# Patient Record
Sex: FEMALE | Race: BLACK OR AFRICAN AMERICAN | NOT HISPANIC OR LATINO | Employment: FULL TIME | URBAN - METROPOLITAN AREA
[De-identification: names, ages, dates, MRNs, and addresses within clinical notes are randomized per-mention and may not be internally consistent; named-entity substitution may affect disease eponyms.]

---

## 2018-01-31 ENCOUNTER — OFFICE VISIT (OUTPATIENT)
Dept: INTERNAL MEDICINE | Facility: CLINIC | Age: 33
End: 2018-01-31
Payer: COMMERCIAL

## 2018-01-31 VITALS
SYSTOLIC BLOOD PRESSURE: 144 MMHG | DIASTOLIC BLOOD PRESSURE: 82 MMHG | TEMPERATURE: 97 F | HEART RATE: 76 BPM | WEIGHT: 221.56 LBS | HEIGHT: 64 IN | BODY MASS INDEX: 37.83 KG/M2

## 2018-01-31 DIAGNOSIS — T63.481A INSECT STINGS, ACCIDENTAL OR UNINTENTIONAL, INITIAL ENCOUNTER: Primary | ICD-10-CM

## 2018-01-31 DIAGNOSIS — R11.0 NAUSEA: ICD-10-CM

## 2018-01-31 DIAGNOSIS — R07.89 SENSATION OF CHEST TIGHTNESS: ICD-10-CM

## 2018-01-31 PROCEDURE — 3008F BODY MASS INDEX DOCD: CPT | Mod: S$GLB,,, | Performed by: NURSE PRACTITIONER

## 2018-01-31 PROCEDURE — 96372 THER/PROPH/DIAG INJ SC/IM: CPT | Mod: S$GLB,,, | Performed by: FAMILY MEDICINE

## 2018-01-31 PROCEDURE — 99999 PR PBB SHADOW E&M-EST. PATIENT-LVL III: CPT | Mod: PBBFAC,,, | Performed by: NURSE PRACTITIONER

## 2018-01-31 PROCEDURE — 99214 OFFICE O/P EST MOD 30 MIN: CPT | Mod: 25,S$GLB,, | Performed by: NURSE PRACTITIONER

## 2018-01-31 RX ORDER — MUPIROCIN 20 MG/G
OINTMENT TOPICAL 2 TIMES DAILY
Qty: 15 G | Refills: 0 | Status: SHIPPED | OUTPATIENT
Start: 2018-01-31 | End: 2018-02-14

## 2018-01-31 RX ORDER — BETAMETHASONE SODIUM PHOSPHATE AND BETAMETHASONE ACETATE 3; 3 MG/ML; MG/ML
12 INJECTION, SUSPENSION INTRA-ARTICULAR; INTRALESIONAL; INTRAMUSCULAR; SOFT TISSUE
Status: COMPLETED | OUTPATIENT
Start: 2018-01-31 | End: 2018-01-31

## 2018-01-31 RX ADMIN — BETAMETHASONE SODIUM PHOSPHATE AND BETAMETHASONE ACETATE 12 MG: 3; 3 INJECTION, SUSPENSION INTRA-ARTICULAR; INTRALESIONAL; INTRAMUSCULAR; SOFT TISSUE at 11:01

## 2018-01-31 NOTE — PROGRESS NOTES
Subjective:       Patient ID: Raiza Mcpherson is a 32 y.o. female.    Chief Complaint: Insect Bite (rt leg)    Pt presents with complaints of possible insect bite to right leg x 1 day    She reports that she is not sure what bit her or when, but she began having mild pain and itching to her right lower leg on yesterday. She later began to experience chest tightness and nausea. She did not take anything OTC. She denies cp, sob, palpitations, fever, dysphagia, blurred vision, headache, dizziness, syncope, abd pain, vomiting or any other acute symptoms.      Review of Systems   Constitutional: Negative for activity change, appetite change, chills, diaphoresis, fatigue, fever and unexpected weight change.   HENT: Negative for congestion, ear pain, postnasal drip, rhinorrhea, sinus pain, sinus pressure, sneezing, sore throat, tinnitus, trouble swallowing and voice change.    Eyes: Negative for photophobia, pain and visual disturbance.   Respiratory: Positive for chest tightness (improved, but still mildly present). Negative for cough, shortness of breath and wheezing.    Cardiovascular: Negative for chest pain, palpitations and leg swelling.   Gastrointestinal: Positive for nausea (resolved ). Negative for abdominal distention, abdominal pain, blood in stool, constipation, diarrhea and vomiting.   Genitourinary: Negative for dysuria and frequency.   Musculoskeletal: Negative for arthralgias, back pain, joint swelling, neck pain and neck stiffness.   Skin: Positive for color change (erythema and itching to right lower leg ).   Neurological: Negative for dizziness, tremors, seizures, syncope, facial asymmetry, speech difficulty, weakness, light-headedness, numbness and headaches.   Psychiatric/Behavioral: Negative for confusion and sleep disturbance. The patient is nervous/anxious.        Objective:      Physical Exam   Constitutional: She is oriented to person, place, and time.   Neck: Normal range of motion. Neck  supple.   Cardiovascular: Normal rate, regular rhythm and normal heart sounds.    Pulmonary/Chest: Effort normal and breath sounds normal.   Musculoskeletal: Normal range of motion.   Neurological: She is alert and oriented to person, place, and time.   Skin: Skin is warm and dry.   There is a small erythematous papule with very mild swelling noted. No signs of secondary infection.    Psychiatric: She has a normal mood and affect.       Assessment:       1. Insect stings, accidental or unintentional, initial encounter    2. Sensation of chest tightness    3. Nausea        Plan:   Insect stings, accidental or unintentional, initial encounter    Sensation of chest tightness    Nausea    Other orders  -     betamethasone acetate-betamethasone sodium phosphate injection 12 mg; Inject 2 mLs (12 mg total) into the muscle one time.  -     mupirocin (BACTROBAN) 2 % ointment; Apply topically 2 (two) times daily.  Dispense: 15 g; Refill: 0      As above  Zyrtec daily PRN, benadryl daily PRN   Pepcid daily OTC x 3 days and PRN  Side effects of steroid injection discussed in detail- she verbalizes understanding    ollow-up care  Follow up with your healthcare provider, or as advised if your symptoms do not continue to improve.  Call 911  Call 911 if any of these occur:  · Trouble breathing or swallowing, wheezing   · Cool, moist, pale skin  · Hoarse voice or trouble speaking  · Confused  · Very drowsy or trouble waking up  · Fainting or loss of consciousness  · Rapid heart rate  · Low blood pressure or feeling dizzy or weak  · Feeling of doom  · Severe nausea, vomiting, or diarrhea  · Seizure  · Swelling in the face, eyelids, lips, mouth, throat or tongue  · Drooling  When to seek medical advice  Call your healthcare provider right away if any of the following occur:  · Spreading areas of itching, redness or swelling  · Headache, fever, chills, muscle or joint aching  · Increased pain or swelling  · Signs of infection of the  affected area:  ¨ Spreading redness  ¨ Increase in pain or swelling  ¨ Fluid or colored drainage from the affected site

## 2018-01-31 NOTE — PATIENT INSTRUCTIONS
Take Zyrtec during the day, benadryl at night for itching  Pepcid over the counter for nausea/stomach irritation      Insect Sting: Local Reaction   You have been stung or bitten by an insect. The insects venom or body fluid is causing your skin to react in the area where you were stung or bitten. This often causes redness, itching and swelling. This reaction will fade over a few hours, but it can last a few days. An insect bite or sting can become infected 1 to 3 days later, so watch for the signs below. Sometimes it is hard to tell the difference between a local reaction to the insect bite or sting and an early infection, so you may be given antibiotics.  Common insect stings causing problems are from wasps, bees, yellow jackets, and hornets. Common bites are from spiders, mosquitoes, fleas, or ticks. Other types of insects may be more common in different parts of the country or world.  Most people think of allergic reactions when someone has a rash or itchy skin. Symptoms can include:  · Rash, hives, redness, welts, or blisters  · Itching, burning, stinging, or pain  · Swelling around the sting area.  Sometimes swelling spreads to other areas.  Home care  Medicines  The healthcare provider may prescribe medicines to relieve swelling, itching, and pain. Follow the providers instructions when taking these medicines.  · If you had a severe reaction, the provider may prescribe an epinephrine kit. Epinephrine will stop an allergic reaction from getting worse. Before you leave the hospital, be sure that you understand when and how to use this medicine.  · Diphenhydramine is an oral antihistamine available at drugstores and groceries. Unless a prescription antihistamine was given, you can use this medicine to reduce itching if large areas of the skin are involved. The medicine may make you sleepy, so be careful using it in the daytime or when going to school, working, or driving. Dont use diphenhydramine if you  have glaucoma or if you are a man with trouble urinating because of an enlarged prostate. Other antihistamines cause less drowsiness and are good choices for daytime use. Ask your pharmacist for suggestions.  · Dont use diphenhydramine cream on your skin. In some people it can cause additional reaction and make you allergic to this medicine.  · Calamine lotion or oatmeal baths sometimes help with itching.  · You may use acetaminophen or ibuprofen to control pain, unless another pain medicine was prescribed. Talk with your healthcare provider before using these medicines if you have chronic liver or kidney disease. Also talk with your provider if youve had a stomach ulcer or GI bleeding.  General care    · If itching is a problem, dont take hot showers or baths. Stay out of direct sunlight. These heat up your skin and will make the itching worse.  · Use an ice pack to reduce local areas of redness and itching. You can make your own ice pack by putting ice cubes in a bag that seals and wrapping it in a thin towel. Dont put the ice directly on your skin, because it can damage the skin.  · Try not to scratch any affected areas and damage the skin. This will help prevent an infection.  · If oral antibiotics were prescribed, be sure to take them until finished.  Preventing future reactions  · Future reactions could be worse than this one, so try to stay away from places where you might be stung again.  · Be aware that honeybees nest in trees. Wasps and yellow jackets nest in the ground, trees, or roof eaves.  · If you are stung by a honeybee, a stinger will remain in your skin. Wasps, yellow jackets, and hornets dont leave a stinger behind. Move away from the nest area right away. The stinger of a honeybee releases a substance that will attract other bees to you. Once you are away from the nest, then remove the stinger as quickly as possible.  · After any sting, you may apply ice and take diphenhydramine or another  antihistamine. If you develop any of the warning signs below, seek help right away.  · If you are at high risk for another sting, or if your reaction included dizziness, fainting, or trouble breathing or swallowing, ask your doctor for an insect allergy kit.  · Remove any ticks on the skin with a set of fine tweezers.  the tick as close to the skin as possible. Pull back gently but firmly. Use an even, steady pressure. Dont jerk or twist. Dont squeeze, crush, or puncture the body of the tick. The bodily fluids may contain infection-causing germs. Dont use a smoldering match or cigarette, nail polish, petroleum jelly, liquid soap, or kerosene. These may irritate the tick. If any mouthparts of the tick remain in the skin, these should be left alone. They will fall off on their own. Trying to remove these parts may damage the skin unless they can be removed very easily. After the tick is removed, wash the bite area with rubbing alcohol, iodine, or soap and water.  Follow-up care  Follow up with your doctor in 2 days, or as advised, if your symptoms dont start to get better.  Call 911  Call 911 if any of these occur:  · Trouble breathing or swallowing, or wheezing  · New or worsening swelling in the mouth, throat, or tongue  · Hoarse voice or trouble speaking  · Confused  · Very drowsy or trouble awakening  · Fainting or loss of consciousness  · Rapid heart rate  · Low blood pressure  · Feeling of doom  · Nausea, vomiting, abdominal pain, or diarrhea  · Vomiting blood, or large amounts of blood in stool  · Seizure  When to seek medical advice  Call your healthcare provider right away if any of these occur:  · Spreading areas of itching, redness or swelling  · New or worse swelling in the face, eyelids, or  lips  · Dizziness or weakness  Also call your provider right away if you have signs of infection:  · Spreading redness  · Increased pain or swelling  · Fever of 100.4°F (38°C) or higher, or as directed by your  healthcare provider  · Colored fluid draining from the sting area   Date Last Reviewed: 10/1/2016  © 9316-3988 [employer]. [address] [place] [address], [place] [address]. All rights reserved. This information is not intended as a substitute for professional medical care. Always follow your healthcare professional's instructions.        Insect Sting Allergy, Generalized  You are having an allergic reaction to an insect sting. This may occur after a sting by a wasp, honeybee, yellow jacket, or other insect. This may cause an itchy rash and swelling in the face or other parts of the body. A more severe reaction may cause you to feel dizzy, faint, or have trouble breathing or swallowing. Other warning signs are listed below.  Symptoms can include:  · Rash, hives, redness, welts, or blisters in areas other than the sting site  · Itching, burning, stinging, pain in areas other than the sting site  · Dry, flaky, cracking, scaly skin  · Swelling in areas other than the sting site   · Stomach pain or cramps  More severe symptoms include:  · Swelling of the face or lips or drooling  · Trouble swallowing, feeling like your throat is closing  · Trouble breathing, wheezing  · Dizziness or a sudden decrease in blood pressure  · Hoarse voice or trouble speaking  · Severe nausea, vomiting, or diarrhea  · Feeling faint or lightheaded  · Rapid heart rate  Home care  Medicine  The healthcare provider may prescribe medicines to relieve swelling, itching, and pain. Follow the providers instructions when taking these medicines.  · If you had a severe reaction, the provider may prescribe an injectable epinephrine kit. Epinephrine will stop the progression of an allergic reaction. Before you leave the hospital, be sure that you understand when and how to use this medicine.  · Oral diphenhydramine is an over-the-counter antihistamine available at pharmacies and grocery stores. Unless a prescription antihistamine was given,  diphenhydramine may be used to reduce itching if large areas of the skin are involved. It may make you sleepy, so be careful using it in the daytime or when going to school, working, or driving. Note: Dont use diphenhydramine if you have glaucoma or if you are a man with trouble urinating due to an enlarged prostate. There are other antihistamines that cause less drowsiness and are good choices for daytime use. Ask your pharmacist for suggestions.  · Dont use diphenhydramine cream on your skin. It can cause a further reaction in some people.  · Calamine lotion or oatmeal baths sometimes help with itching.  · You may use acetaminophen or ibuprofen to control pain, unless another pain medicine was prescribed. Note: If you have chronic liver or kidney disease or ever had a stomach ulcer or gastrointestinal bleeding, talk with your provider before using these medicines.    General care  Avoid tight clothing and things that heat up your skin (such as hot showers or baths, or direct sunlight). Heat makes the itching worse.  An ice pack will relieve local areas of intense itching and redness. Apply 5 to 10 minutes. To make an ice pack, put ice cubes in a plastic bag that seals at the top. Wrap the bag in a clean, thin towel or cloth. Dont put ice directly on the skin.  Ticks  If you try to remove a tick, do the following:  · Use a set of fine tweezers and  the tick as close to the skin as is possible.  · Pull upwards, using even, steady pressure. Dont jerk or twist the tick. The ticks bodily fluids may contain infection-causing organisms. So dont squeeze, crush, or puncture the body of the tick. Dont use a smoldering match or cigarette, nail polish, petroleum jelly, liquid soap, or kerosene. They may irritate the tick.  · If any mouthparts of the tick remain in the skin, these can be removed with tweezers. If you cant remove the mouth (of a tick) easily with clean tweezers, leave it alone and let the skin  heal.  · After the tick is removed, wash the bite area with rubbing alcohol, iodine, or soap and water.  · Put the tick in a sealed container and completely cover it with alcohol. Never try to kill or crush a tick with your hand or fingers.  Stings  Wasps, yellow jackets, and hornets dont leave a stinger behind. But if a honeybee stings you, a stinger may stay in your skin. The stinger of a honeybee releases a substance that will attract other bees to you. So try to move away from the nest immediately. Once you are away from the nest, then remove the stinger as quickly as possible by:  · Scraping the stinger out with the edge of a dull knife or plastic card (credit card).  · Don't use a tweezer or your fingers to remove the stinger since that may squeeze more toxin from the stinger.  · Wash the affected area with soap and warm water 2 to 3 times a day. Don't break a blister, if present.  · Next apply an ice pack for 5 to 10 minutes. To make an ice pack, put ice cubes in a plastic bag that seals at the top. Wrap the bag in a clean, thin towel or cloth. Dont put ice directly on the skin.  · Contact your healthcare provider and ask what can be used to help decrease the swelling and itching to the affected area.   · To prevent an infection, don't scratch the affected areas. Always check the sting area for signs of an infection: increased redness, swelling, or pain to the affected area.  Preventing future reactions  Future reactions could be worse than this one. So try to avoid situations where you might be stung:  · Don't walk in grass without shoes. Avoid wearing sandals.  · Don't leave food uncovered when eating outside. Sweet treats, watermelon, and ice cream attract insects.  · Don't drink from uncovered sweetened drinks in cans when outside. Insects are attracted to soda drink cans and sometimes crawl inside of them.  · Don't wear bright colored clothes with flowery prints and patterns when outside.  · Dont wear  perfume when outside. Smell attracts insects.  · Wear long pants, long-sleeved shirts, socks, and work gloves when working outside.  · Be aware that honeybees nest in trees. Wasps and yellow jackets nest in the ground, trees or roof eaves. Avoid garbage cans when outside.  Auto-injectable epinephrine  · If you are at high risk for another sting due to where you work or play, or if your reaction included dizziness, fainting or trouble breathing or swallowing, an auto-injectable epinephrine may be prescribed. If not, ask your healthcare provider for one and always carry it with you. Learn how to use the device. If you begin to feel the symptoms of another reaction in the future, use the auto-injectable epinephrine to inject yourself, and then call 911. Don't wait until symptoms become severe.   · Remember that the auto-injectable epinephrine is a rescue medicine only. You still need someone to take you to the hospital or call 911 after you have received the medicine.  Follow-up care  Follow up with your healthcare provider, or as advised if your symptoms do not continue to improve.  Call 911  Call 911 if any of these occur:  · Trouble breathing or swallowing, wheezing   · Cool, moist, pale skin  · Hoarse voice or trouble speaking  · Confused  · Very drowsy or trouble waking up  · Fainting or loss of consciousness  · Rapid heart rate  · Low blood pressure or feeling dizzy or weak  · Feeling of doom  · Severe nausea, vomiting, or diarrhea  · Seizure  · Swelling in the face, eyelids, lips, mouth, throat or tongue  · Drooling  When to seek medical advice  Call your healthcare provider right away if any of the following occur:  · Spreading areas of itching, redness or swelling  · Headache, fever, chills, muscle or joint aching  · Increased pain or swelling  · Signs of infection of the affected area:  ¨ Spreading redness  ¨ Increase in pain or swelling  ¨ Fluid or colored drainage from the affected site  Date Last Reviewed:  3/1/2017  © 2929-5822 [employer]. [address] [place] [address], [place] [address]. All rights reserved. This information is not intended as a substitute for professional medical care. Always follow your healthcare professional's instructions.

## 2018-11-14 LAB
HUMAN PAPILLOMAVIRUS (HPV): NORMAL
PAP RECOMMENDATION EXT: ABNORMAL

## 2019-08-16 ENCOUNTER — OFFICE VISIT (OUTPATIENT)
Dept: INTERNAL MEDICINE | Facility: CLINIC | Age: 34
End: 2019-08-16
Payer: COMMERCIAL

## 2019-08-16 VITALS
WEIGHT: 207.44 LBS | SYSTOLIC BLOOD PRESSURE: 130 MMHG | DIASTOLIC BLOOD PRESSURE: 82 MMHG | HEIGHT: 64 IN | OXYGEN SATURATION: 97 % | HEART RATE: 85 BPM | BODY MASS INDEX: 35.41 KG/M2 | TEMPERATURE: 97 F

## 2019-08-16 DIAGNOSIS — R10.11 RIGHT UPPER QUADRANT ABDOMINAL PAIN: Primary | ICD-10-CM

## 2019-08-16 PROCEDURE — 99214 OFFICE O/P EST MOD 30 MIN: CPT | Mod: S$GLB,,, | Performed by: FAMILY MEDICINE

## 2019-08-16 PROCEDURE — 99999 PR PBB SHADOW E&M-EST. PATIENT-LVL III: ICD-10-PCS | Mod: PBBFAC,,, | Performed by: FAMILY MEDICINE

## 2019-08-16 PROCEDURE — 99214 PR OFFICE/OUTPT VISIT, EST, LEVL IV, 30-39 MIN: ICD-10-PCS | Mod: S$GLB,,, | Performed by: FAMILY MEDICINE

## 2019-08-16 PROCEDURE — 99999 PR PBB SHADOW E&M-EST. PATIENT-LVL III: CPT | Mod: PBBFAC,,, | Performed by: FAMILY MEDICINE

## 2019-08-16 RX ORDER — BENAZEPRIL HYDROCHLORIDE AND HYDROCHLOROTHIAZIDE 10; 12.5 MG/1; MG/1
1 TABLET ORAL DAILY
COMMUNITY
End: 2020-02-04 | Stop reason: SDUPTHER

## 2019-08-16 RX ORDER — OMEPRAZOLE 40 MG/1
40 CAPSULE, DELAYED RELEASE ORAL DAILY
Qty: 30 CAPSULE | Refills: 11 | Status: SHIPPED | OUTPATIENT
Start: 2019-08-16 | End: 2020-02-04

## 2019-08-16 RX ORDER — LABETALOL 300 MG/1
300 TABLET, FILM COATED ORAL 3 TIMES DAILY
COMMUNITY
End: 2020-02-04 | Stop reason: SDUPTHER

## 2019-08-16 NOTE — PROGRESS NOTES
Raiza Mcpherson  08/16/2019  [id number]    Primary Doctor No  Patient Care Team:  Primary Doctor No as PCP - General  Primary Doctor No as PCP - Family Medicine  Has the patient seen any provider outside of the [facility] network since the last visit? (no). If yes, HIPPA forms completed and records requested.      Chief Complaint:  Chief Complaint   Patient presents with    Abdominal Pain       History of Present Illness:  Patient is a 34-year-old female presents clinic today complaining of worsening abdominal pain over the past 4 days.    Abd Pain:  -went to [facility] ED 2 days prior with chest discomfort  -states EKG was normal and was discharged  -localizes pain to RUQ and radiates to back  -no hx of gallstones or kidney problems  -recently gave birth vaginally 2 months prior  -has not been seen by OB/GYN since delivery           History:  Past Medical History:   Diagnosis Date    Asthma      History reviewed. No pertinent surgical history.  Family History   Problem Relation Age of Onset    Hypertension Father     Kidney disease Paternal Grandmother     Cancer Paternal Grandmother     Kidney disease Paternal Grandfather     Vision loss Paternal Grandfather     Hypertension Mother      Social History     Socioeconomic History    Marital status: Single     Spouse name: Not on file    Number of children: Not on file    Years of education: Not on file    Highest education level: Not on file   Occupational History     Employer: [employer]   Social Needs    Financial resource strain: Not hard at all    Food insecurity:     Worry: Never true     Inability: Never true    Transportation needs:     Medical: No     Non-medical: No   Tobacco Use    Smoking status: Never Smoker   Substance and Sexual Activity    Alcohol use: No     Frequency: Never     Binge frequency: Never    Drug use: No    Sexual activity: Not Currently   Lifestyle    Physical activity:     Days per week: 3 days      Minutes per session: 20 min    Stress: To some extent   Relationships    Social connections:     Talks on phone: More than three times a week     Gets together: More than three times a week     Attends [employer] service: Not on file     Active member of club or organization: Yes     Attends meetings of clubs or organizations: 1 to 4 times per year     Relationship status: Never    Other Topics Concern    Not on file   Social History Narrative    Not on file     There is no problem list on file for this patient.    Review of patient's allergies indicates:  No Known Allergies    The following were reviewed at this visit: active problem list, medication list, allergies, family history, social history, and health maintenance.    Medications:  Current Outpatient Medications on File Prior to Visit   Medication Sig Dispense Refill    benazepril-hydrochlorthiazide (LOTENSIN HCT) 10-12.5 mg Tab Take 1 tablet by mouth once daily.      fluticasone (FLONASE) 50 mcg/actuation nasal spray 2 sprays by Each Nare route once daily. 16 g 0    labetalol (NORMODYNE) 300 MG tablet Take 300 mg by mouth 3 (three) times daily.      loratadine (CLARITIN) 10 mg tablet Take 1 tablet (10 mg total) by mouth once daily. 30 tablet 1     No current facility-administered medications on file prior to visit.        Medications have been reviewed and reconciled with patient at this visit.  Barriers to medications present (no)    Adverse reactions to current medications (no)    Over the counter medications reviewed (Yes ), and if needed added to active Medication list at this visit.     Exam:  Wt Readings from Last 3 Encounters:   08/16/19 94.1 kg (207 lb 7.3 oz)   01/31/18 100.5 kg (221 lb 9 oz)   11/09/16 99.9 kg (220 lb 3.8 oz)     Temp Readings from Last 3 Encounters:   08/16/19 96.9 °F (36.1 °C) (Tympanic)   01/31/18 96.9 °F (36.1 °C) (Tympanic)   11/09/16 97.6 °F (36.4 °C) (Tympanic)     BP Readings from Last 3 Encounters:    08/16/19 130/82   01/31/18 (!) 144/82   11/09/16 (!) 158/80     Pulse Readings from Last 3 Encounters:   08/16/19 85   01/31/18 76   11/09/16 93     Body mass index is 35.61 kg/m².      Review of Systems   Constitutional: Negative for chills, fever and malaise/fatigue.   HENT: Negative for hearing loss.    Eyes: Negative for redness.   Respiratory: Negative for cough and shortness of breath.    Cardiovascular: Negative for chest pain.   Gastrointestinal: Positive for abdominal pain and diarrhea. Negative for nausea and vomiting.   Musculoskeletal: Negative for joint pain and myalgias.   Skin: Negative for rash.   Neurological: Negative for focal weakness and headaches.     Physical Exam   Constitutional: She is oriented to person, place, and time. She appears well-developed and well-nourished. No distress.   HENT:   Head: Normocephalic and atraumatic.   Eyes: Conjunctivae are normal. No scleral icterus.   Cardiovascular: Normal rate, regular rhythm and normal heart sounds. Exam reveals no gallop and no friction rub.   No murmur heard.  Pulmonary/Chest: Effort normal and breath sounds normal. No respiratory distress. She has no wheezes. She has no rales.   Abdominal: Soft. Bowel sounds are normal. She exhibits no distension and no mass. There is tenderness in the right upper quadrant and epigastric area. There is no rebound, no guarding and no CVA tenderness.   Neurological: She is alert and oriented to person, place, and time.   Skin: Skin is warm and dry. She is not diaphoretic.   Psychiatric: She has a normal mood and affect.   Nursing note and vitals reviewed.      Laboratory Reviewed ({N/A)  No results found for: WBC, HGB, HCT, PLT, CHOL, TRIG, HDL, LDLDIRECT, ALT, AST, NA, K, CL, CREATININE, BUN, CO2, TSH, PSA, INR, GLUF, HGBA1C, MICROALBUR    Raiza was seen today for abdominal pain.    Diagnoses and all orders for this visit:    Right upper quadrant abdominal pain  -     CBC auto differential; Future  -      Comprehensive metabolic panel; Future  -     Urinalysis; Future  -     Urine culture; Future  -     Pregnancy, urine rapid; Future  -     US Abdomen Complete; Future  -     Lipase; Future  -     Amylase; Future  -     omeprazole (PRILOSEC) 40 MG capsule; Take 1 capsule (40 mg total) by mouth once daily.  -     ranitidine (ZANTAC) 150 MG tablet; Take 1 tablet (150 mg total) by mouth 2 (two) times daily.    Gave birth to child recently  -     CBC auto differential; Future  -     Comprehensive metabolic panel; Future  -     Urinalysis; Future  -     Urine culture; Future  -     Pregnancy, urine rapid; Future  -     US Abdomen Complete; Future  -     Lipase; Future  -     Amylase; Future  -     omeprazole (PRILOSEC) 40 MG capsule; Take 1 capsule (40 mg total) by mouth once daily.  -     ranitidine (ZANTAC) 150 MG tablet; Take 1 tablet (150 mg total) by mouth 2 (two) times daily.     Abdominal pain:  -patient's history and exam suggest possible cholecystitis, but differential diagnosis includes gastritis and pancreatitis  -patient has no signs of an acute abdomen today (mildly tender to palpation, no tachycardia, nonfebrile, and normal blood pressure)  -ED precautions instructed to the patient if symptoms do not improve or acutely worsen  -if workup is within normal limits, would consider evaluation by GI  -advised patient to contact her OBGYN and let them know that she is having this abdominal pain 2 months after she delivered    -Treatment options and alternatives were discussed with the patient. Patient expressed understanding. Patient was given the opportunity to ask questions and be an active participant in their medical care. Patient had no further questions or concerns at this time.   -Patient is an overall moderate risk for health complications from their medical conditions.     Follow up: Follow up in about 1 week (around 8/23/2019), or if symptoms worsen or fail to improve.    After visit summary was printed  and given to patient upon discharge today.  Patient goals and care plan are included in After Visit Summary.

## 2019-08-26 ENCOUNTER — HOSPITAL ENCOUNTER (OUTPATIENT)
Dept: RADIOLOGY | Facility: HOSPITAL | Age: 34
Discharge: HOME | End: 2019-08-26
Attending: FAMILY MEDICINE
Payer: COMMERCIAL

## 2019-08-26 DIAGNOSIS — R10.11 RIGHT UPPER QUADRANT ABDOMINAL PAIN: ICD-10-CM

## 2019-08-26 PROCEDURE — 76700 US EXAM ABDOM COMPLETE: CPT | Mod: TC

## 2019-08-27 DIAGNOSIS — R19.05 ABDOMINAL WALL MASS OF PERIUMBILICAL REGION: Primary | ICD-10-CM

## 2019-08-28 ENCOUNTER — OFFICE VISIT (OUTPATIENT)
Dept: INTERNAL MEDICINE | Facility: CLINIC | Age: 34
End: 2019-08-28
Payer: COMMERCIAL

## 2019-08-28 ENCOUNTER — TELEPHONE (OUTPATIENT)
Dept: INTERNAL MEDICINE | Facility: CLINIC | Age: 34
End: 2019-08-28

## 2019-08-28 VITALS
BODY MASS INDEX: 32.53 KG/M2 | HEART RATE: 87 BPM | HEIGHT: 67 IN | WEIGHT: 207.25 LBS | DIASTOLIC BLOOD PRESSURE: 78 MMHG | SYSTOLIC BLOOD PRESSURE: 138 MMHG | TEMPERATURE: 97 F | OXYGEN SATURATION: 95 %

## 2019-08-28 DIAGNOSIS — K80.20 CALCULUS OF GALLBLADDER WITHOUT CHOLECYSTITIS WITHOUT OBSTRUCTION: Primary | ICD-10-CM

## 2019-08-28 DIAGNOSIS — R19.05 ABDOMINAL WALL MASS OF PERIUMBILICAL REGION: ICD-10-CM

## 2019-08-28 PROCEDURE — 99999 PR PBB SHADOW E&M-EST. PATIENT-LVL III: ICD-10-PCS | Mod: PBBFAC,,, | Performed by: FAMILY MEDICINE

## 2019-08-28 PROCEDURE — 99213 OFFICE O/P EST LOW 20 MIN: CPT | Mod: S$GLB,,, | Performed by: FAMILY MEDICINE

## 2019-08-28 PROCEDURE — 99999 PR PBB SHADOW E&M-EST. PATIENT-LVL III: CPT | Mod: PBBFAC,,, | Performed by: FAMILY MEDICINE

## 2019-08-28 PROCEDURE — 99213 PR OFFICE/OUTPT VISIT, EST, LEVL III, 20-29 MIN: ICD-10-PCS | Mod: S$GLB,,, | Performed by: FAMILY MEDICINE

## 2019-08-28 NOTE — PROGRESS NOTES
Raiza Mcpherson  08/28/2019  [id number]    Primary Doctor No  Patient Care Team:  Primary Doctor No as PCP - General  Primary Doctor No as PCP - Family Medicine  Has the patient seen any provider outside of the [facility] network since the last visit? (no). If yes, HIPPA forms completed and records requested.      Chief Complaint:  Chief Complaint   Patient presents with    Follow-up    Abdominal Pain       History of Present Illness:  Patient is a 34-year-old female presents clinic today for follow up of abdominal pain and ultrasound results.    Abd Pain:  -patient's the ultrasound went well  -patient states that her pain has moved from her right upper quadrant to her periumbilical area  -states she was told that she had something on ultrasound, but is unsure what it is    Abdominal Pain   Pertinent negatives include no diarrhea, fever, headaches, myalgias, nausea or vomiting.           History:  Past Medical History:   Diagnosis Date    Asthma      History reviewed. No pertinent surgical history.  Family History   Problem Relation Age of Onset    Hypertension Father     Kidney disease Paternal Grandmother     Cancer Paternal Grandmother     Kidney disease Paternal Grandfather     Vision loss Paternal Grandfather     Hypertension Mother      Social History     Socioeconomic History    Marital status: Single     Spouse name: Not on file    Number of children: Not on file    Years of education: Not on file    Highest education level: Not on file   Occupational History     Employer: [employer]   Social Needs    Financial resource strain: Not hard at all    Food insecurity:     Worry: Never true     Inability: Never true    Transportation needs:     Medical: No     Non-medical: No   Tobacco Use    Smoking status: Never Smoker   Substance and Sexual Activity    Alcohol use: No     Frequency: Never     Binge frequency: Never    Drug use: No    Sexual activity: Not Currently    Lifestyle    Physical activity:     Days per week: 3 days     Minutes per session: 20 min    Stress: To some extent   Relationships    Social connections:     Talks on phone: More than three times a week     Gets together: More than three times a week     Attends [employer] service: Not on file     Active member of club or organization: Yes     Attends meetings of clubs or organizations: 1 to 4 times per year     Relationship status: Never    Other Topics Concern    Not on file   Social History Narrative    Not on file     There is no problem list on file for this patient.    Review of patient's allergies indicates:  No Known Allergies    The following were reviewed at this visit: active problem list, medication list, allergies, family history, social history, and health maintenance.    Medications:  Current Outpatient Medications on File Prior to Visit   Medication Sig Dispense Refill    benazepril-hydrochlorthiazide (LOTENSIN HCT) 10-12.5 mg Tab Take 1 tablet by mouth once daily.      fluticasone (FLONASE) 50 mcg/actuation nasal spray 2 sprays by Each Nare route once daily. 16 g 0    labetalol (NORMODYNE) 300 MG tablet Take 300 mg by mouth 3 (three) times daily.      loratadine (CLARITIN) 10 mg tablet Take 1 tablet (10 mg total) by mouth once daily. 30 tablet 1    omeprazole (PRILOSEC) 40 MG capsule Take 1 capsule (40 mg total) by mouth once daily. 30 capsule 11    ranitidine (ZANTAC) 150 MG tablet Take 1 tablet (150 mg total) by mouth 2 (two) times daily. 60 tablet 11     No current facility-administered medications on file prior to visit.        Medications have been reviewed and reconciled with patient at this visit.  Barriers to medications present (no)    Adverse reactions to current medications (no)    Over the counter medications reviewed (Yes ), and if needed added to active Medication list at this visit.     Exam:  Wt Readings from Last 3 Encounters:   08/28/19 94 kg (207 lb 3.7 oz)    08/16/19 94.1 kg (207 lb 7.3 oz)   01/31/18 100.5 kg (221 lb 9 oz)     Temp Readings from Last 3 Encounters:   08/28/19 96.8 °F (36 °C) (Tympanic)   08/16/19 96.9 °F (36.1 °C) (Tympanic)   01/31/18 96.9 °F (36.1 °C) (Tympanic)     BP Readings from Last 3 Encounters:   08/28/19 138/78   08/16/19 130/82   01/31/18 (!) 144/82     Pulse Readings from Last 3 Encounters:   08/28/19 87   08/16/19 85   01/31/18 76     Body mass index is 32.46 kg/m².      Review of Systems   Constitutional: Negative for chills, fever and malaise/fatigue.   HENT: Negative for hearing loss.    Eyes: Negative for redness.   Respiratory: Negative for cough and shortness of breath.    Cardiovascular: Negative for chest pain.   Gastrointestinal: Positive for abdominal pain. Negative for diarrhea, nausea and vomiting.   Musculoskeletal: Negative for joint pain and myalgias.   Skin: Negative for rash.   Neurological: Negative for focal weakness and headaches.     Physical Exam   Constitutional: She is oriented to person, place, and time. She appears well-developed and well-nourished. No distress.   HENT:   Head: Normocephalic and atraumatic.   Eyes: Conjunctivae are normal. No scleral icterus.   Cardiovascular: Normal rate, regular rhythm and normal heart sounds. Exam reveals no gallop and no friction rub.   No murmur heard.  Pulmonary/Chest: Effort normal and breath sounds normal. No respiratory distress. She has no wheezes. She has no rales.   Abdominal: Soft. Bowel sounds are normal. She exhibits no distension and no mass. There is tenderness in the epigastric area and periumbilical area. There is no rebound, no guarding and no CVA tenderness.   Neurological: She is alert and oriented to person, place, and time.   Skin: Skin is warm and dry. She is not diaphoretic.   Psychiatric: She has a normal mood and affect.   Nursing note and vitals reviewed.      Laboratory Reviewed ({N/A)  Lab Results   Component Value Date    WBC 4.96 08/16/2019    HGB  11.3 (L) 08/16/2019    HCT 39.0 08/16/2019     08/16/2019    ALT 21 08/16/2019    AST 17 08/16/2019     08/16/2019    K 4.3 08/16/2019     08/16/2019    CREATININE 1.0 08/16/2019    BUN 11 08/16/2019    CO2 29 08/16/2019     EXAMINATION:  US ABDOMEN COMPLETE    CLINICAL HISTORY:  Right upper quadrant pain    TECHNIQUE:  Complete abdominal ultrasound (including pancreas, liver, gallbladder, common bile duct, spleen, aorta, IVC, and kidneys) was performed.    COMPARISON:  None    FINDINGS:  Liver: Normal in size, measuring 16.6 cm. Homogeneous echotexture.  Focal, hypoechoic area within the left hepatic lobe measuring 2.2 x 1.2 x 1.8 cm.    Gallbladder: Gallbladder is slightly contracted.  There is mild wall thickening and focal areas of, tail reverberation artifact from the gallbladder wall concerning for adenomyomatosis.  There are a few mobile shadowing stones.  No sonographic Mckeon sign or pericholecystic fluid.  Few echogenic, presume granulomas.  Hepatorenal index measures 1.4.    Biliary system: The common duct is not dilated, measuring 3 mm.  No intrahepatic ductal dilatation.    Spleen: Normal in size with a homogeneous echotexture, measuring 8.6 cm.  Few echogenic presumed granulomas.    Pancreas: The visualized portions of pancreas appear normal.    Right kidney: Normal in size with no hydronephrosis, measuring 11.2 cm.    Left kidney:  Normal in size with no hydronephrosis, measuring 10.7 cm.    Aorta: No aneurysm.    Inferior vena cava: Normal in appearance.    Miscellaneous: There is a large heterogeneous, mostly cystic mass in the periumbilical area measuring 4.2 x 4.3 x 4.2 cm within the periumbilical region either within the subcutaneous tissues or in the anterior abdominal cavity.  No evidence of internal peristalsis or significant vascularity.      Impression       Cholelithiasis without sonographic evidence of acute cholecystitis.    Gallbladder adenomyomatosis.    Solid and  cystic periumbilical mass either within the subcutaneous tissues or anterior peritoneal space.  No significant vascularity or peristalsis.  Findings may represent a hernia or potentially evolving hematoma or other fluid collection.  Consider further evaluation with CT.    Hypoechoic lesion in the left hepatic lobe measuring up to 2.2 cm.  This can also be further assessed with CT.    Hepatic and splenic granulomas.      Electronically signed by: Willie Bermudez  Date: 08/26/2019  Time: 10:42       Raiza was seen today for follow-up and abdominal pain.    Diagnoses and all orders for this visit:    Calculus of gallbladder without cholecystitis without obstruction  -     Ambulatory Referral to General Surgery    Abdominal wall mass of periumbilical region  -     Ambulatory Referral to General Surgery     Abdominal pain:  -discussed and reviewed patient's ultrasound results  -patient is scheduled to have a CT abdomen pelvis with and without contrast in 6 days  -patient states that she would like to be referred to a general surgeon to discuss possible surgical options pending CT scan results    -Treatment options and alternatives were discussed with the patient. Patient expressed understanding. Patient was given the opportunity to ask questions and be an active participant in their medical care. Patient had no further questions or concerns at this time.   -Patient is an overall moderate risk for health complications from their medical conditions.     Follow up: Follow up if symptoms worsen or fail to improve.    After visit summary was printed and given to patient upon discharge today.  Patient goals and care plan are included in After Visit Summary.

## 2019-08-28 NOTE — TELEPHONE ENCOUNTER
----- Message from Irina Fung sent at 8/28/2019  9:46 AM CDT -----  Contact: Patient  Type:  Test Results    Who Called: Patient  Name of Test (Lab/Mammo/Etc): labs  Date of Test:   Ordering Provider: Dr Arceo  Where the test was performed: Hospital  Would the patient rather a call back or a response via [facility]? call  Best Call Back Number:[phone]  Additional Information:

## 2019-08-30 ENCOUNTER — TELEPHONE (OUTPATIENT)
Dept: RADIOLOGY | Facility: HOSPITAL | Age: 34
End: 2019-08-30

## 2019-09-04 ENCOUNTER — HOSPITAL ENCOUNTER (OUTPATIENT)
Dept: RADIOLOGY | Facility: HOSPITAL | Age: 34
Discharge: HOME | End: 2019-09-04
Attending: FAMILY MEDICINE
Payer: COMMERCIAL

## 2019-09-04 DIAGNOSIS — R19.05 ABDOMINAL WALL MASS OF PERIUMBILICAL REGION: ICD-10-CM

## 2019-09-04 PROCEDURE — 25500020 PHARM REV CODE 255: Performed by: FAMILY MEDICINE

## 2019-09-04 PROCEDURE — 74178 CT ABDOMEN PELVIS W WO CONTRAST: ICD-10-PCS | Mod: 26,,, | Performed by: RADIOLOGY

## 2019-09-04 PROCEDURE — 74178 CT ABD&PLV WO CNTR FLWD CNTR: CPT | Mod: 26,,, | Performed by: RADIOLOGY

## 2019-09-04 PROCEDURE — 74178 CT ABD&PLV WO CNTR FLWD CNTR: CPT | Mod: TC

## 2019-09-04 RX ADMIN — IOHEXOL 100 ML: 350 INJECTION, SOLUTION INTRAVENOUS at 09:09

## 2019-09-04 RX ADMIN — IOHEXOL 30 ML: 350 INJECTION, SOLUTION INTRAVENOUS at 07:09

## 2019-09-05 ENCOUNTER — OFFICE VISIT (OUTPATIENT)
Dept: SURGERY | Facility: CLINIC | Age: 34
End: 2019-09-05
Payer: COMMERCIAL

## 2019-09-05 VITALS
SYSTOLIC BLOOD PRESSURE: 124 MMHG | HEART RATE: 68 BPM | BODY MASS INDEX: 32.63 KG/M2 | DIASTOLIC BLOOD PRESSURE: 78 MMHG | WEIGHT: 208.31 LBS | TEMPERATURE: 98 F

## 2019-09-05 DIAGNOSIS — R10.11 RUQ PAIN: ICD-10-CM

## 2019-09-05 DIAGNOSIS — R22.2 MASS OF ANTERIOR ABDOMINAL WALL: ICD-10-CM

## 2019-09-05 DIAGNOSIS — K80.20 CHOLELITHIASIS WITHOUT CHOLECYSTITIS: ICD-10-CM

## 2019-09-05 DIAGNOSIS — Z01.818 PRE-OP TESTING: Primary | ICD-10-CM

## 2019-09-05 DIAGNOSIS — K43.9 VENTRAL HERNIA WITHOUT OBSTRUCTION OR GANGRENE: Primary | ICD-10-CM

## 2019-09-05 PROCEDURE — 99999 PR PBB SHADOW E&M-EST. PATIENT-LVL III: CPT | Mod: PBBFAC,,, | Performed by: SURGERY

## 2019-09-05 PROCEDURE — 99204 OFFICE O/P NEW MOD 45 MIN: CPT | Mod: S$GLB,,, | Performed by: SURGERY

## 2019-09-05 PROCEDURE — 99204 PR OFFICE/OUTPT VISIT, NEW, LEVL IV, 45-59 MIN: ICD-10-PCS | Mod: S$GLB,,, | Performed by: SURGERY

## 2019-09-05 PROCEDURE — 99999 PR PBB SHADOW E&M-EST. PATIENT-LVL III: ICD-10-PCS | Mod: PBBFAC,,, | Performed by: SURGERY

## 2019-09-05 RX ORDER — LIDOCAINE HYDROCHLORIDE 10 MG/ML
1 INJECTION, SOLUTION EPIDURAL; INFILTRATION; INTRACAUDAL; PERINEURAL ONCE
Status: DISCONTINUED | OUTPATIENT
Start: 2019-09-05 | End: 2019-11-27 | Stop reason: HOSPADM

## 2019-09-05 RX ORDER — SODIUM CHLORIDE 9 MG/ML
INJECTION, SOLUTION INTRAVENOUS CONTINUOUS
Status: CANCELLED | OUTPATIENT
Start: 2019-09-05

## 2019-09-05 NOTE — LETTER
September 5, 2019      Rao Arceo MD  [id number]  [place] [address]           [facility]  [address] [address]  Phone: [phone]  Fax: [phone]          Patient: Raiza Mcpherson   MR Number: [id number]   YOB: 1985   Date of Visit: 9/5/2019       Dear Dr. Rao Arceo:    Thank you for referring Raiza Mcpherson to me for evaluation. Attached you will find relevant portions of my assessment and plan of care.    If you have questions, please do not hesitate to call me. I look forward to following Raiza Mcpherson along with you.    Sincerely,    Angie Pagan MD    Enclosure  CC:  No Recipients    If you would like to receive this communication electronically, please contact [email or web address] or [phone] to request more information on [employer] access.    For providers and/or their staff who would like to refer a patient to [facility], please contact us through our one-stop-shop provider referral line, [facility], at [phone].    If you feel you have received this communication in error or would no longer like to receive these types of communications, please e-mail [email or web address]

## 2019-09-05 NOTE — PROGRESS NOTES
History & Physical  General Surgery      SUBJECTIVE:     Chief Complaint/Reason for Admission: Consult (Calculus of Gallbladder without Cholecystitis )      History of Present Illness:  Patient is a 34 y.o. female presents with Consult (Calculus of Gallbladder without Cholecystitis )    34-year-old female referred for evaluation of cholelithiasis and abdominal wall mass.  She is 2 months postpartum and reports a notable abdominal wall mass above her umbilicus when she was 5 months pregnant.  She states that the mass is occasionally painful.  She denies any obstructive signs or symptoms.  This was noted by her OBGYN and elected to be followed.  She reports 2 episodes of right upper quadrant pain radiating to her back during pregnancy.  This prompted an ultrasound which revealed cholelithiasis without evidence of cholecystitis.  These episodes of pain are not related to p.o. Intake, they occur at night, and last for approximately 2-3 hours until the pain subsides.  She has been seen in the ER at [facility] once after an episode of pain and discharged with a diagnosis of gas.    Workup by her PCP included an ultrasound which showed cholelithiasis without evidence of cholecystitis.  This ultrasound simultaneously showed a cystic structure in her abdominal wall subcutaneous tissues.  A CT scan was then obtained which showed and epigastric ventral hernia as well as an umbilical hernia in the area of the cystic mass.  She denies any abdominal wall cellulitis, infections, or abscesses. She was referred to surgery for evaluation.      Current Outpatient Medications:     benazepril-hydrochlorthiazide (LOTENSIN HCT) 10-12.5 mg Tab, Take 1 tablet by mouth once daily., Disp: , Rfl:     fluticasone (FLONASE) 50 mcg/actuation nasal spray, 2 sprays by Each Nare route once daily., Disp: 16 g, Rfl: 0    labetalol (NORMODYNE) 300 MG tablet, Take 300 mg by mouth 3 (three) times daily., Disp: , Rfl:     omeprazole  (PRILOSEC) 40 MG capsule, Take 1 capsule (40 mg total) by mouth once daily., Disp: 30 capsule, Rfl: 11    ranitidine (ZANTAC) 150 MG tablet, Take 1 tablet (150 mg total) by mouth 2 (two) times daily., Disp: 60 tablet, Rfl: 11    loratadine (CLARITIN) 10 mg tablet, Take 1 tablet (10 mg total) by mouth once daily., Disp: 30 tablet, Rfl: 1    Current Facility-Administered Medications:     lidocaine (PF) 10 mg/ml (1%) injection 10 mg, 1 mL, Intradermal, Once, Angie Pagan MD    Review of patient's allergies indicates:  No Known Allergies    Past Medical History:   Diagnosis Date    Asthma      No past surgical history on file.  Family History   Problem Relation Age of Onset    Hypertension Father     Kidney disease Paternal Grandmother     Cancer Paternal Grandmother     Kidney disease Paternal Grandfather     Vision loss Paternal Grandfather     Hypertension Mother      Social History     Tobacco Use    Smoking status: Never Smoker   Substance Use Topics    Alcohol use: No     Frequency: Never     Binge frequency: Never    Drug use: No        Review of Systems:  Review of Systems   Constitutional: Negative for unexpected weight change.   HENT: Negative for congestion.    Eyes: Negative for visual disturbance.   Respiratory: Negative for shortness of breath.    Cardiovascular: Negative for chest pain.   Gastrointestinal: Positive for abdominal pain (Epigastric abdominal wall). Negative for nausea and vomiting.   Genitourinary: Negative for difficulty urinating.   Skin: Negative for rash.   Allergic/Immunologic: Negative for immunocompromised state.   Neurological: Negative for weakness.   Psychiatric/Behavioral: The patient is not nervous/anxious.        OBJECTIVE:     Vital Signs (Most Recent)  /78   Pulse 68   Temp 98.4 °F (36.9 °C) (Oral)   Wt 94.5 kg (208 lb 5.4 oz)   BMI 32.63 kg/m²     Physical Exam:   Physical Exam   Constitutional: She is oriented to person, place, and time. She  appears well-developed and well-nourished. No distress.   HENT:   Head: Normocephalic and atraumatic.   Eyes: EOM are normal.   Neck: Neck supple.   Cardiovascular: Normal rate and regular rhythm.   Pulmonary/Chest: Effort normal.   Abdominal: Soft. She exhibits no distension. There is tenderness (At site of epigastric hernia). There is no rebound and no guarding. A hernia ( non reducible epigastric hernia, tender; palpable umbilical hernia; unable to clearly delineate cystic structure within subcutaneous tissues versus hernia) is present.   Musculoskeletal: Normal range of motion.   Neurological: She is alert and oriented to person, place, and time.   Skin: Skin is warm.   Vitals reviewed.    Ultrasound and CT scan reviewed personally and with patient.    ASSESSMENT/PLAN:     Ventral hernia without obstruction or gangrene  -     Case Request Operating Room: XI ROBOTIC REPAIR, HERNIA, VENTRAL  -     Insert peripheral IV; Standing  -     Full code; Standing    RUQ pain  -     NM Hepatobiliary (HIDA) W Pharm and Ejec; Future; Expected date: 09/05/2019    Cholelithiasis without cholecystitis    Mass of anterior abdominal wall    Other orders  -     lidocaine (PF) 10 mg/ml (1%) injection 10 mg  -     Progressive Mobility Protocol (mobilize patient to their highest level of functioning at least twice daily); Standing      Plan for robotic assisted ventral hernia repair and excision of abdominal wall SQ mass on 9/23/19.   Abdominal symptoms not consistent with biliary colic. HIDA to evaluate if gallstones cause of pain. If + will do simultaneous cholecystectomy.    Risks, benefits, and alternatives including bleeding, infection, scarring, damage to surrounding structures, recurrence, mesh infection requiring removal, or need for further surgery were discussed with the patient who agrees to proceed with surgery.     Angie Pagan

## 2019-09-20 ENCOUNTER — TELEPHONE (OUTPATIENT)
Dept: SURGERY | Facility: CLINIC | Age: 34
End: 2019-09-20

## 2019-09-20 DIAGNOSIS — Z01.818 PREOP TESTING: Primary | ICD-10-CM

## 2019-11-15 ENCOUNTER — PATIENT MESSAGE (OUTPATIENT)
Dept: SURGERY | Facility: CLINIC | Age: 34
End: 2019-11-15

## 2019-11-15 ENCOUNTER — TELEPHONE (OUTPATIENT)
Dept: SURGERY | Facility: CLINIC | Age: 34
End: 2019-11-15

## 2019-11-15 NOTE — TELEPHONE ENCOUNTER
----- Message from Ana Swanson sent at 11/15/2019 10:58 AM CST -----  Contact: Pt  Pt asked that nurse return her call, she has questions about a appointment that she is wanting to cancel and reschedule. ([phone])

## 2019-11-21 ENCOUNTER — OFFICE VISIT (OUTPATIENT)
Dept: SURGERY | Facility: CLINIC | Age: 34
End: 2019-11-21
Payer: COMMERCIAL

## 2019-11-21 VITALS
DIASTOLIC BLOOD PRESSURE: 78 MMHG | BODY MASS INDEX: 36.44 KG/M2 | HEART RATE: 76 BPM | WEIGHT: 212.31 LBS | SYSTOLIC BLOOD PRESSURE: 124 MMHG | TEMPERATURE: 99 F

## 2019-11-21 DIAGNOSIS — R22.2 MASS OF ANTERIOR ABDOMINAL WALL: ICD-10-CM

## 2019-11-21 DIAGNOSIS — K43.9 VENTRAL HERNIA WITHOUT OBSTRUCTION OR GANGRENE: Primary | ICD-10-CM

## 2019-11-21 DIAGNOSIS — K80.20 CHOLELITHIASIS WITHOUT CHOLECYSTITIS: ICD-10-CM

## 2019-11-21 PROCEDURE — 99214 OFFICE O/P EST MOD 30 MIN: CPT | Mod: S$GLB,,, | Performed by: SURGERY

## 2019-11-21 PROCEDURE — 99999 PR PBB SHADOW E&M-EST. PATIENT-LVL III: ICD-10-PCS | Mod: PBBFAC,,, | Performed by: SURGERY

## 2019-11-21 PROCEDURE — 99999 PR PBB SHADOW E&M-EST. PATIENT-LVL III: CPT | Mod: PBBFAC,,, | Performed by: SURGERY

## 2019-11-21 PROCEDURE — 99214 PR OFFICE/OUTPT VISIT, EST, LEVL IV, 30-39 MIN: ICD-10-PCS | Mod: S$GLB,,, | Performed by: SURGERY

## 2019-11-21 NOTE — H&P (VIEW-ONLY)
History & Physical  General Surgery      SUBJECTIVE:     Chief Complaint/Reason for Admission: Pre-op Exam      History of Present Illness:  Patient is a 34 y.o. female presents with Pre-op Exam    34-year-old female known to me for cholelithiasis, ventral hernia, umbilical hernia, and abdominal wall mass.  She was previously scheduled for surgery but did not perform her HIDA scan or show up the day of surgery. She was seen in the ER on 11/19/19 at [facility] with complaints of periumbilical pain. She is here to reestablish care.  She reports an episode of nausea and vomiting yesterday but has since resolved.      Current Outpatient Medications:     benazepril-hydrochlorthiazide (LOTENSIN HCT) 10-12.5 mg Tab, Take 1 tablet by mouth once daily., Disp: , Rfl:     fluticasone (FLONASE) 50 mcg/actuation nasal spray, 2 sprays by Each Nare route once daily. (Patient taking differently: 2 sprays by Each Nostril route daily as needed. ), Disp: 16 g, Rfl: 0    labetalol (NORMODYNE) 300 MG tablet, Take 300 mg by mouth 3 (three) times daily., Disp: , Rfl:     omeprazole (PRILOSEC) 40 MG capsule, Take 1 capsule (40 mg total) by mouth once daily., Disp: 30 capsule, Rfl: 11    ranitidine (ZANTAC) 150 MG tablet, Take 1 tablet (150 mg total) by mouth 2 (two) times daily., Disp: 60 tablet, Rfl: 11    loratadine (CLARITIN) 10 mg tablet, Take 1 tablet (10 mg total) by mouth once daily. (Patient taking differently: Take 10 mg by mouth daily as needed. ), Disp: 30 tablet, Rfl: 1    Current Facility-Administered Medications:     lidocaine (PF) 10 mg/ml (1%) injection 10 mg, 1 mL, Intradermal, Once, Angie Pagan MD    Review of patient's allergies indicates:  No Known Allergies    Past Medical History:   Diagnosis Date    Asthma     childhood    Hypertension      No past surgical history on file.  Family History   Problem Relation Age of Onset    Hypertension Father     Kidney disease Paternal Grandmother     Cancer Paternal  Grandmother     Kidney disease Paternal Grandfather     Vision loss Paternal Grandfather     Hypertension Mother      Social History     Tobacco Use    Smoking status: Never Smoker    Smokeless tobacco: Never Used   Substance Use Topics    Alcohol use: No     Frequency: Never     Binge frequency: Never    Drug use: No        Review of Systems:  Review of Systems   Constitutional: Negative for unexpected weight change.   HENT: Negative for congestion.    Eyes: Negative for visual disturbance.   Respiratory: Negative for shortness of breath.    Cardiovascular: Negative for chest pain.   Gastrointestinal: Positive for abdominal pain, nausea and vomiting.   Genitourinary: Negative for difficulty urinating.   Skin: Negative for rash.   Allergic/Immunologic: Negative for immunocompromised state.   Neurological: Negative for weakness.   Psychiatric/Behavioral: The patient is not nervous/anxious.        OBJECTIVE:     Vital Signs (Most Recent)  /78   Pulse 76   Temp 98.5 °F (36.9 °C) (Oral)   Wt 96.3 kg (212 lb 4.9 oz)   BMI 36.44 kg/m²     Physical Exam:   Physical Exam   Constitutional: She is oriented to person, place, and time. She appears well-developed and well-nourished. No distress.   HENT:   Head: Normocephalic and atraumatic.   Eyes: EOM are normal.   Neck: Neck supple.   Cardiovascular: Normal rate and regular rhythm.   Pulmonary/Chest: Effort normal.   Abdominal: Soft. She exhibits no distension. There is no tenderness. A hernia (epigastric, tender) is present.   Musculoskeletal: Normal range of motion.   Neurological: She is alert and oriented to person, place, and time.   Skin: Skin is warm.   Vitals reviewed.    No additional imaging    ASSESSMENT/PLAN:     Ventral hernia without obstruction or gangrene    Cholelithiasis without cholecystitis    Will reschedule the patient to perform her HIDA scan.     Will book the patient for surgery after this is performed, as she has demonstrated that she  is not reliable and easily lost to follow-up.    Angie Pagan

## 2019-11-21 NOTE — PROGRESS NOTES
History & Physical  General Surgery      SUBJECTIVE:     Chief Complaint/Reason for Admission: Pre-op Exam      History of Present Illness:  Patient is a 34 y.o. female presents with Pre-op Exam    34-year-old female known to me for cholelithiasis, ventral hernia, umbilical hernia, and abdominal wall mass.  She was previously scheduled for surgery but did not perform her HIDA scan or show up the day of surgery. She was seen in the ER on 11/19/19 at [facility] with complaints of periumbilical pain. She is here to reestablish care.  She reports an episode of nausea and vomiting yesterday but has since resolved.      Current Outpatient Medications:     benazepril-hydrochlorthiazide (LOTENSIN HCT) 10-12.5 mg Tab, Take 1 tablet by mouth once daily., Disp: , Rfl:     fluticasone (FLONASE) 50 mcg/actuation nasal spray, 2 sprays by Each Nare route once daily. (Patient taking differently: 2 sprays by Each Nostril route daily as needed. ), Disp: 16 g, Rfl: 0    labetalol (NORMODYNE) 300 MG tablet, Take 300 mg by mouth 3 (three) times daily., Disp: , Rfl:     omeprazole (PRILOSEC) 40 MG capsule, Take 1 capsule (40 mg total) by mouth once daily., Disp: 30 capsule, Rfl: 11    ranitidine (ZANTAC) 150 MG tablet, Take 1 tablet (150 mg total) by mouth 2 (two) times daily., Disp: 60 tablet, Rfl: 11    loratadine (CLARITIN) 10 mg tablet, Take 1 tablet (10 mg total) by mouth once daily. (Patient taking differently: Take 10 mg by mouth daily as needed. ), Disp: 30 tablet, Rfl: 1    Current Facility-Administered Medications:     lidocaine (PF) 10 mg/ml (1%) injection 10 mg, 1 mL, Intradermal, Once, Angie Pagan MD    Review of patient's allergies indicates:  No Known Allergies    Past Medical History:   Diagnosis Date    Asthma     childhood    Hypertension      No past surgical history on file.  Family History   Problem Relation Age of Onset    Hypertension Father     Kidney disease Paternal Grandmother     Cancer Paternal  Grandmother     Kidney disease Paternal Grandfather     Vision loss Paternal Grandfather     Hypertension Mother      Social History     Tobacco Use    Smoking status: Never Smoker    Smokeless tobacco: Never Used   Substance Use Topics    Alcohol use: No     Frequency: Never     Binge frequency: Never    Drug use: No        Review of Systems:  Review of Systems   Constitutional: Negative for unexpected weight change.   HENT: Negative for congestion.    Eyes: Negative for visual disturbance.   Respiratory: Negative for shortness of breath.    Cardiovascular: Negative for chest pain.   Gastrointestinal: Positive for abdominal pain, nausea and vomiting.   Genitourinary: Negative for difficulty urinating.   Skin: Negative for rash.   Allergic/Immunologic: Negative for immunocompromised state.   Neurological: Negative for weakness.   Psychiatric/Behavioral: The patient is not nervous/anxious.        OBJECTIVE:     Vital Signs (Most Recent)  /78   Pulse 76   Temp 98.5 °F (36.9 °C) (Oral)   Wt 96.3 kg (212 lb 4.9 oz)   BMI 36.44 kg/m²     Physical Exam:   Physical Exam   Constitutional: She is oriented to person, place, and time. She appears well-developed and well-nourished. No distress.   HENT:   Head: Normocephalic and atraumatic.   Eyes: EOM are normal.   Neck: Neck supple.   Cardiovascular: Normal rate and regular rhythm.   Pulmonary/Chest: Effort normal.   Abdominal: Soft. She exhibits no distension. There is no tenderness. A hernia (epigastric, tender) is present.   Musculoskeletal: Normal range of motion.   Neurological: She is alert and oriented to person, place, and time.   Skin: Skin is warm.   Vitals reviewed.    No additional imaging    ASSESSMENT/PLAN:     Ventral hernia without obstruction or gangrene    Cholelithiasis without cholecystitis    Will reschedule the patient to perform her HIDA scan.     Will book the patient for surgery after this is performed, as she has demonstrated that she  is not reliable and easily lost to follow-up.    Angie Pagan

## 2019-11-22 ENCOUNTER — HOSPITAL ENCOUNTER (OUTPATIENT)
Dept: RADIOLOGY | Facility: HOSPITAL | Age: 34
Discharge: HOME | End: 2019-11-22
Attending: SURGERY
Payer: COMMERCIAL

## 2019-11-22 DIAGNOSIS — R10.11 RUQ PAIN: ICD-10-CM

## 2019-11-22 PROCEDURE — 78227 NM HEPATOBILIARY(HIDA) WITH PHARM AND EF: ICD-10-PCS | Mod: 26,,, | Performed by: RADIOLOGY

## 2019-11-22 PROCEDURE — 27202308 NM HEPATOBILIARY(HIDA) WITH PHARM AND EF

## 2019-11-22 PROCEDURE — 78227 HEPATOBIL SYST IMAGE W/DRUG: CPT | Mod: 26,,, | Performed by: RADIOLOGY

## 2019-11-22 PROCEDURE — A9537 TC99M MEBROFENIN: HCPCS

## 2019-11-25 ENCOUNTER — TELEPHONE (OUTPATIENT)
Dept: SURGERY | Facility: CLINIC | Age: 34
End: 2019-11-25

## 2019-11-25 ENCOUNTER — CLINICAL SUPPORT (OUTPATIENT)
Dept: CARDIOLOGY | Facility: CLINIC | Age: 34
End: 2019-11-25
Payer: COMMERCIAL

## 2019-11-25 DIAGNOSIS — K43.9 VENTRAL HERNIA WITHOUT OBSTRUCTION OR GANGRENE: ICD-10-CM

## 2019-11-25 DIAGNOSIS — K43.9 VENTRAL HERNIA WITHOUT OBSTRUCTION OR GANGRENE: Primary | ICD-10-CM

## 2019-11-25 PROCEDURE — 93010 EKG 12-LEAD: ICD-10-PCS | Mod: S$GLB,,, | Performed by: NUCLEAR MEDICINE

## 2019-11-25 PROCEDURE — 93005 ELECTROCARDIOGRAM TRACING: CPT | Mod: S$GLB,,, | Performed by: SURGERY

## 2019-11-25 PROCEDURE — 93010 ELECTROCARDIOGRAM REPORT: CPT | Mod: S$GLB,,, | Performed by: NUCLEAR MEDICINE

## 2019-11-25 PROCEDURE — 93005 EKG 12-LEAD: ICD-10-PCS | Mod: S$GLB,,, | Performed by: SURGERY

## 2019-11-25 RX ORDER — SODIUM CHLORIDE 9 MG/ML
INJECTION, SOLUTION INTRAVENOUS CONTINUOUS
Status: CANCELLED | OUTPATIENT
Start: 2019-11-25

## 2019-11-25 RX ORDER — LIDOCAINE HYDROCHLORIDE 10 MG/ML
1 INJECTION, SOLUTION EPIDURAL; INFILTRATION; INTRACAUDAL; PERINEURAL ONCE
Status: DISCONTINUED | OUTPATIENT
Start: 2019-11-25 | End: 2019-11-25 | Stop reason: HOSPADM

## 2019-11-25 NOTE — TELEPHONE ENCOUNTER
----- Message from Angie Pagan MD sent at 11/25/2019 10:09 AM CST -----  Please let her know that her gallbladder function is normal. We can proceed with robotic hernia repair on 11/27 (this Wednesday) of 12/11.

## 2019-11-26 ENCOUNTER — LAB VISIT (OUTPATIENT)
Dept: LAB | Facility: HOSPITAL | Age: 34
End: 2019-11-26
Payer: COMMERCIAL

## 2019-11-26 DIAGNOSIS — Z01.818 PRE-OP TESTING: Primary | ICD-10-CM

## 2019-11-26 DIAGNOSIS — Z01.818 PRE-OP TESTING: ICD-10-CM

## 2019-11-26 LAB
ANION GAP SERPL CALC-SCNC: 9 MMOL/L (ref 8–16)
BASOPHILS # BLD AUTO: 0.04 K/UL (ref 0–0.2)
BASOPHILS NFR BLD: 0.8 % (ref 0–1.9)
BUN SERPL-MCNC: 12 MG/DL (ref 6–20)
CALCIUM SERPL-MCNC: 9.4 MG/DL (ref 8.7–10.5)
CHLORIDE SERPL-SCNC: 106 MMOL/L (ref 95–110)
CO2 SERPL-SCNC: 25 MMOL/L (ref 23–29)
CREAT SERPL-MCNC: 1.1 MG/DL (ref 0.5–1.4)
DIFFERENTIAL METHOD: ABNORMAL
EOSINOPHIL # BLD AUTO: 0.2 K/UL (ref 0–0.5)
EOSINOPHIL NFR BLD: 3.6 % (ref 0–8)
ERYTHROCYTE [DISTWIDTH] IN BLOOD BY AUTOMATED COUNT: 13.9 % (ref 11.5–14.5)
EST. GFR  (AFRICAN AMERICAN): >60 ML/MIN/1.73 M^2
EST. GFR  (NON AFRICAN AMERICAN): >60 ML/MIN/1.73 M^2
GLUCOSE SERPL-MCNC: 111 MG/DL (ref 70–110)
HCT VFR BLD AUTO: 37.7 % (ref 37–48.5)
HGB BLD-MCNC: 11.6 G/DL (ref 12–16)
IMM GRANULOCYTES # BLD AUTO: 0.01 K/UL (ref 0–0.04)
IMM GRANULOCYTES NFR BLD AUTO: 0.2 % (ref 0–0.5)
LYMPHOCYTES # BLD AUTO: 1.6 K/UL (ref 1–4.8)
LYMPHOCYTES NFR BLD: 33.1 % (ref 18–48)
MCH RBC QN AUTO: 26.4 PG (ref 27–31)
MCHC RBC AUTO-ENTMCNC: 30.8 G/DL (ref 32–36)
MCV RBC AUTO: 86 FL (ref 82–98)
MONOCYTES # BLD AUTO: 0.3 K/UL (ref 0.3–1)
MONOCYTES NFR BLD: 5.7 % (ref 4–15)
NEUTROPHILS # BLD AUTO: 2.7 K/UL (ref 1.8–7.7)
NEUTROPHILS NFR BLD: 56.8 % (ref 38–73)
NRBC BLD-RTO: 0 /100 WBC
PLATELET # BLD AUTO: 239 K/UL (ref 150–350)
PMV BLD AUTO: 11.4 FL (ref 9.2–12.9)
POTASSIUM SERPL-SCNC: 4.1 MMOL/L (ref 3.5–5.1)
RBC # BLD AUTO: 4.39 M/UL (ref 4–5.4)
SODIUM SERPL-SCNC: 140 MMOL/L (ref 136–145)
WBC # BLD AUTO: 4.75 K/UL (ref 3.9–12.7)

## 2019-11-26 PROCEDURE — 80048 BASIC METABOLIC PNL TOTAL CA: CPT

## 2019-11-26 PROCEDURE — 36415 COLL VENOUS BLD VENIPUNCTURE: CPT

## 2019-11-26 PROCEDURE — 85025 COMPLETE CBC W/AUTO DIFF WBC: CPT

## 2019-11-26 NOTE — PRE ADMISSION SCREENING
Pre op instructions reviewed with patient per phone:    To confirm, Your surgeon has instructed you:  Surgery is scheduled 11/27/19at 0800.      Please report to [facility] 1[facility] floor main lobby by 0630.  Pre admit office to call later today only if arrival time changes.      INSTRUCTIONS IMPORTANT!!!  ¨ No smoking after 12 midnight, the night before surgery.  ¨ No solid food after 12 midnight, but you may have clear liquids up until 3 hours prior to surgery.  This includes: grape, cranberry, and apple juice (not orange, and no coffee.)   ¨ OK to brush teeth, but no gum, candy or mints!    ¨ Take only these medicines with a small swallow of water-morning of surgery.  Labetalol, Ranitidine        ____  Do not wear makeup, including mascara.  ____  No powder, lotions or creams to surgical area.  ____  Please remove all jewelry, including piercings and leave at home.  ____  No money or valuables needed. Please leave at home.  ____  Please bring identification and insurance information to hospital.  ____  If going home the same day, arrange for a ride home. You will not be able to   drive if Anesthesia was used.  ____  Children, under 12 years old, must remain in the waiting room with an adult.  They are not allowed in patient areas.  ____  Wear loose fitting clothing. Allow for dressings, bandages.  ____  Stop Aspirin, Ibuprofen, Motrin and Aleve at least 5-7 days before surgery, unless otherwise instructed by your doctor, or the nurse.   You MAY use Tylenol/acetaminophen until day of surgery.  ____  If you take diabetic medication, do not take am of surgery unless instructed by   Doctor.  ____ Stop taking any Fish Oil supplement or any Vitamins that contain Vitamin E at least 5 days prior to surgery.          Bathing Instructions-- The night before surgery and the morning prior to coming to the hospital:   -Do not shave the surgical area.   -Shower and wash your hair and body as usual with your  regular soap and shampoo.   -Rinse your hair and body completely.   -Use one packet of hibiclens to wash the surgical site (using your hand) gently for 5 minutes.  Do not scrub you skin too hard.   -Do not use hibiclens on your head, face, or genitals.   -Do not wash with regular soap after you use the hibiclens.   -Rinse your body thoroughly.   -Dry with clean, soft towel.  Do not use lotion, cream, deodorant, or powders on   the surgical site.    Use antibacterial soap in place of hibiclens if your surgery is on the head, face or genitals.         Surgical Site Infection    Prevention of surgical site infections:     -Keep incisions clean and dry.   -Do not soak/submerge incisions in water until completely healed.   -Do not apply lotions, powders, creams, or deodorants to site.   -Always make sure hands are cleaned with antibacterial soap/ alcohol-based   prior to touching the surgical site.  (This includes doctors, nurses, staff, and yourself.)    Signs and symptoms:   -Redness and pain around the area where you had surgery   -Drainage of cloudy fluid from your surgical wound   -Fever over 100.4  I have read or had read and explained to me, and understand the above information.

## 2019-11-27 ENCOUNTER — ANESTHESIA EVENT (OUTPATIENT)
Dept: SURGERY | Facility: HOSPITAL | Age: 34
End: 2019-11-27
Payer: COMMERCIAL

## 2019-11-27 ENCOUNTER — HOSPITAL ENCOUNTER (OUTPATIENT)
Facility: HOSPITAL | Age: 34
Discharge: HOME | End: 2019-11-27
Attending: SURGERY | Admitting: SURGERY
Payer: COMMERCIAL

## 2019-11-27 ENCOUNTER — ANESTHESIA (OUTPATIENT)
Dept: SURGERY | Facility: HOSPITAL | Age: 34
End: 2019-11-27
Payer: COMMERCIAL

## 2019-11-27 VITALS
RESPIRATION RATE: 19 BRPM | WEIGHT: 210.56 LBS | OXYGEN SATURATION: 97 % | BODY MASS INDEX: 35.08 KG/M2 | SYSTOLIC BLOOD PRESSURE: 124 MMHG | HEIGHT: 65 IN | HEART RATE: 79 BPM | TEMPERATURE: 98 F | DIASTOLIC BLOOD PRESSURE: 74 MMHG

## 2019-11-27 DIAGNOSIS — K43.9 VENTRAL HERNIA WITHOUT OBSTRUCTION OR GANGRENE: ICD-10-CM

## 2019-11-27 PROBLEM — N80.129 ENDOMETRIOMA: Status: ACTIVE | Noted: 2019-09-05

## 2019-11-27 LAB
B-HCG UR QL: NEGATIVE
CTP QC/QA: YES

## 2019-11-27 PROCEDURE — 88305 TISSUE EXAM BY PATHOLOGIST: ICD-10-PCS | Mod: 26,,, | Performed by: PATHOLOGY

## 2019-11-27 PROCEDURE — 63600175 PHARM REV CODE 636 W HCPCS: Performed by: SURGERY

## 2019-11-27 PROCEDURE — 27201423 OPTIME MED/SURG SUP & DEVICES STERILE SUPPLY: Performed by: SURGERY

## 2019-11-27 PROCEDURE — 36000711: Performed by: SURGERY

## 2019-11-27 PROCEDURE — 36000710: Performed by: SURGERY

## 2019-11-27 PROCEDURE — 25000003 PHARM REV CODE 250: Performed by: SURGERY

## 2019-11-27 PROCEDURE — C1781 MESH (IMPLANTABLE): HCPCS | Performed by: SURGERY

## 2019-11-27 PROCEDURE — 63600175 PHARM REV CODE 636 W HCPCS: Performed by: ANESTHESIOLOGY

## 2019-11-27 PROCEDURE — 81025 URINE PREGNANCY TEST: CPT | Performed by: SURGERY

## 2019-11-27 PROCEDURE — 71000033 HC RECOVERY, INTIAL HOUR: Performed by: SURGERY

## 2019-11-27 PROCEDURE — 37000008 HC ANESTHESIA 1ST 15 MINUTES: Performed by: SURGERY

## 2019-11-27 PROCEDURE — 49653 PR LAP VENTRAL/UMBILICAL HERNIA; INCARCERATED OR STRANGULATED: CPT | Mod: ,,, | Performed by: SURGERY

## 2019-11-27 PROCEDURE — 88305 TISSUE EXAM BY PATHOLOGIST: CPT | Performed by: PATHOLOGY

## 2019-11-27 PROCEDURE — 71000039 HC RECOVERY, EACH ADD'L HOUR: Performed by: SURGERY

## 2019-11-27 PROCEDURE — 71000015 HC POSTOP RECOV 1ST HR: Performed by: SURGERY

## 2019-11-27 PROCEDURE — 37000009 HC ANESTHESIA EA ADD 15 MINS: Performed by: SURGERY

## 2019-11-27 PROCEDURE — 25000003 PHARM REV CODE 250: Performed by: NURSE ANESTHETIST, CERTIFIED REGISTERED

## 2019-11-27 PROCEDURE — 88305 TISSUE EXAM BY PATHOLOGIST: CPT | Mod: 26,,, | Performed by: PATHOLOGY

## 2019-11-27 PROCEDURE — 63600175 PHARM REV CODE 636 W HCPCS: Performed by: NURSE ANESTHETIST, CERTIFIED REGISTERED

## 2019-11-27 PROCEDURE — 49653 PR LAP VENTRAL/UMBILICAL HERNIA; INCARCERATED OR STRANGULATED: ICD-10-PCS | Mod: ,,, | Performed by: SURGERY

## 2019-11-27 DEVICE — MESH HERNIA LG 8X8: Type: IMPLANTABLE DEVICE | Site: ABDOMEN | Status: FUNCTIONAL

## 2019-11-27 RX ORDER — GLYCOPYRROLATE 0.2 MG/ML
INJECTION INTRAMUSCULAR; INTRAVENOUS
Status: DISCONTINUED | OUTPATIENT
Start: 2019-11-27 | End: 2019-11-27

## 2019-11-27 RX ORDER — SODIUM CHLORIDE 0.9 % (FLUSH) 0.9 %
10 SYRINGE (ML) INJECTION
Status: DISCONTINUED | OUTPATIENT
Start: 2019-11-27 | End: 2019-11-27 | Stop reason: HOSPADM

## 2019-11-27 RX ORDER — BUPIVACAINE HYDROCHLORIDE 2.5 MG/ML
INJECTION, SOLUTION EPIDURAL; INFILTRATION; INTRACAUDAL
Status: DISCONTINUED | OUTPATIENT
Start: 2019-11-27 | End: 2019-11-27 | Stop reason: HOSPADM

## 2019-11-27 RX ORDER — ONDANSETRON 2 MG/ML
4 INJECTION INTRAMUSCULAR; INTRAVENOUS DAILY PRN
Status: DISCONTINUED | OUTPATIENT
Start: 2019-11-27 | End: 2019-11-27 | Stop reason: HOSPADM

## 2019-11-27 RX ORDER — SUCCINYLCHOLINE CHLORIDE 20 MG/ML
INJECTION INTRAMUSCULAR; INTRAVENOUS
Status: DISCONTINUED | OUTPATIENT
Start: 2019-11-27 | End: 2019-11-27

## 2019-11-27 RX ORDER — CEFAZOLIN SODIUM 2 G/50ML
2 SOLUTION INTRAVENOUS
Status: DISCONTINUED | OUTPATIENT
Start: 2019-11-27 | End: 2019-11-27 | Stop reason: HOSPADM

## 2019-11-27 RX ORDER — FENTANYL CITRATE 50 UG/ML
INJECTION, SOLUTION INTRAMUSCULAR; INTRAVENOUS
Status: DISCONTINUED | OUTPATIENT
Start: 2019-11-27 | End: 2019-11-27

## 2019-11-27 RX ORDER — SODIUM CHLORIDE 9 MG/ML
INJECTION, SOLUTION INTRAVENOUS CONTINUOUS
Status: DISCONTINUED | OUTPATIENT
Start: 2019-11-27 | End: 2019-11-27 | Stop reason: HOSPADM

## 2019-11-27 RX ORDER — KETOROLAC TROMETHAMINE 30 MG/ML
INJECTION, SOLUTION INTRAMUSCULAR; INTRAVENOUS
Status: DISCONTINUED | OUTPATIENT
Start: 2019-11-27 | End: 2019-11-27

## 2019-11-27 RX ORDER — ONDANSETRON 2 MG/ML
INJECTION INTRAMUSCULAR; INTRAVENOUS
Status: DISCONTINUED | OUTPATIENT
Start: 2019-11-27 | End: 2019-11-27

## 2019-11-27 RX ORDER — PROPOFOL 10 MG/ML
INJECTION, EMULSION INTRAVENOUS
Status: DISCONTINUED | OUTPATIENT
Start: 2019-11-27 | End: 2019-11-27

## 2019-11-27 RX ORDER — KETOROLAC TROMETHAMINE 30 MG/ML
15 INJECTION, SOLUTION INTRAMUSCULAR; INTRAVENOUS EVERY 8 HOURS PRN
Status: DISCONTINUED | OUTPATIENT
Start: 2019-11-27 | End: 2019-11-27 | Stop reason: HOSPADM

## 2019-11-27 RX ORDER — METOPROLOL TARTRATE 1 MG/ML
INJECTION, SOLUTION INTRAVENOUS
Status: DISCONTINUED | OUTPATIENT
Start: 2019-11-27 | End: 2019-11-27

## 2019-11-27 RX ORDER — AMOXICILLIN 250 MG
1 CAPSULE ORAL DAILY
Qty: 5 TABLET | Refills: 0 | Status: SHIPPED | OUTPATIENT
Start: 2019-11-27 | End: 2019-12-02

## 2019-11-27 RX ORDER — HYDROCODONE BITARTRATE AND ACETAMINOPHEN 7.5; 325 MG/1; MG/1
1 TABLET ORAL EVERY 6 HOURS PRN
Qty: 20 TABLET | Refills: 0 | Status: SHIPPED | OUTPATIENT
Start: 2019-11-27 | End: 2019-12-02

## 2019-11-27 RX ORDER — SODIUM CHLORIDE, SODIUM LACTATE, POTASSIUM CHLORIDE, CALCIUM CHLORIDE 600; 310; 30; 20 MG/100ML; MG/100ML; MG/100ML; MG/100ML
INJECTION, SOLUTION INTRAVENOUS CONTINUOUS PRN
Status: DISCONTINUED | OUTPATIENT
Start: 2019-11-27 | End: 2019-11-27

## 2019-11-27 RX ORDER — ROCURONIUM BROMIDE 10 MG/ML
INJECTION, SOLUTION INTRAVENOUS
Status: DISCONTINUED | OUTPATIENT
Start: 2019-11-27 | End: 2019-11-27

## 2019-11-27 RX ORDER — LIDOCAINE HCL/PF 100 MG/5ML
SYRINGE (ML) INTRAVENOUS
Status: DISCONTINUED | OUTPATIENT
Start: 2019-11-27 | End: 2019-11-27

## 2019-11-27 RX ORDER — MIDAZOLAM HYDROCHLORIDE 1 MG/ML
INJECTION, SOLUTION INTRAMUSCULAR; INTRAVENOUS
Status: DISCONTINUED | OUTPATIENT
Start: 2019-11-27 | End: 2019-11-27

## 2019-11-27 RX ORDER — DEXAMETHASONE SODIUM PHOSPHATE 4 MG/ML
INJECTION, SOLUTION INTRA-ARTICULAR; INTRALESIONAL; INTRAMUSCULAR; INTRAVENOUS; SOFT TISSUE
Status: DISCONTINUED | OUTPATIENT
Start: 2019-11-27 | End: 2019-11-27

## 2019-11-27 RX ORDER — HYDROMORPHONE HYDROCHLORIDE 2 MG/ML
0.2 INJECTION, SOLUTION INTRAMUSCULAR; INTRAVENOUS; SUBCUTANEOUS EVERY 5 MIN PRN
Status: DISCONTINUED | OUTPATIENT
Start: 2019-11-27 | End: 2019-11-27 | Stop reason: HOSPADM

## 2019-11-27 RX ORDER — PHENYLEPHRINE HYDROCHLORIDE 10 MG/ML
INJECTION INTRAVENOUS
Status: DISCONTINUED | OUTPATIENT
Start: 2019-11-27 | End: 2019-11-27

## 2019-11-27 RX ORDER — ACETAMINOPHEN 10 MG/ML
1000 INJECTION, SOLUTION INTRAVENOUS ONCE
Status: COMPLETED | OUTPATIENT
Start: 2019-11-27 | End: 2019-11-27

## 2019-11-27 RX ORDER — NEOSTIGMINE METHYLSULFATE 1 MG/ML
INJECTION, SOLUTION INTRAVENOUS
Status: DISCONTINUED | OUTPATIENT
Start: 2019-11-27 | End: 2019-11-27

## 2019-11-27 RX ADMIN — KETOROLAC TROMETHAMINE 15 MG: 30 INJECTION, SOLUTION INTRAMUSCULAR; INTRAVENOUS at 09:11

## 2019-11-27 RX ADMIN — HYDROMORPHONE HYDROCHLORIDE 0.2 MG: 2 INJECTION, SOLUTION INTRAMUSCULAR; INTRAVENOUS; SUBCUTANEOUS at 10:11

## 2019-11-27 RX ADMIN — ONDANSETRON 4 MG: 2 INJECTION, SOLUTION INTRAMUSCULAR; INTRAVENOUS at 09:11

## 2019-11-27 RX ADMIN — FENTANYL CITRATE 25 MCG: 50 INJECTION, SOLUTION INTRAMUSCULAR; INTRAVENOUS at 09:11

## 2019-11-27 RX ADMIN — DEXAMETHASONE SODIUM PHOSPHATE 4 MG: 4 INJECTION, SOLUTION INTRA-ARTICULAR; INTRALESIONAL; INTRAMUSCULAR; INTRAVENOUS; SOFT TISSUE at 08:11

## 2019-11-27 RX ADMIN — CEFAZOLIN SODIUM 2 G: 2 SOLUTION INTRAVENOUS at 08:11

## 2019-11-27 RX ADMIN — ROCURONIUM BROMIDE 30 MG: 10 INJECTION, SOLUTION INTRAVENOUS at 08:11

## 2019-11-27 RX ADMIN — FENTANYL CITRATE 150 MCG: 50 INJECTION, SOLUTION INTRAMUSCULAR; INTRAVENOUS at 07:11

## 2019-11-27 RX ADMIN — ROBINUL 0.8 MG: 0.2 INJECTION INTRAMUSCULAR; INTRAVENOUS at 09:11

## 2019-11-27 RX ADMIN — LIDOCAINE HYDROCHLORIDE 50 MG: 20 INJECTION, SOLUTION INTRAVENOUS at 07:11

## 2019-11-27 RX ADMIN — HYDROMORPHONE HYDROCHLORIDE 0.2 MG: 2 INJECTION, SOLUTION INTRAMUSCULAR; INTRAVENOUS; SUBCUTANEOUS at 11:11

## 2019-11-27 RX ADMIN — MIDAZOLAM 2 MG: 1 INJECTION INTRAMUSCULAR; INTRAVENOUS at 07:11

## 2019-11-27 RX ADMIN — PROPOFOL 200 MG: 10 INJECTION, EMULSION INTRAVENOUS at 07:11

## 2019-11-27 RX ADMIN — PHENYLEPHRINE HYDROCHLORIDE 40 MCG: 10 INJECTION INTRAVENOUS at 08:11

## 2019-11-27 RX ADMIN — PHENYLEPHRINE HYDROCHLORIDE 100 MCG: 10 INJECTION INTRAVENOUS at 09:11

## 2019-11-27 RX ADMIN — METOPROLOL TARTRATE 2 MG: 1 INJECTION, SOLUTION INTRAVENOUS at 08:11

## 2019-11-27 RX ADMIN — ACETAMINOPHEN 1000 MG: 10 INJECTION, SOLUTION INTRAVENOUS at 10:11

## 2019-11-27 RX ADMIN — SODIUM CHLORIDE, SODIUM LACTATE, POTASSIUM CHLORIDE, AND CALCIUM CHLORIDE: 600; 310; 30; 20 INJECTION, SOLUTION INTRAVENOUS at 07:11

## 2019-11-27 RX ADMIN — ROCURONIUM BROMIDE 20 MG: 10 INJECTION, SOLUTION INTRAVENOUS at 09:11

## 2019-11-27 RX ADMIN — FENTANYL CITRATE 50 MCG: 50 INJECTION, SOLUTION INTRAMUSCULAR; INTRAVENOUS at 08:11

## 2019-11-27 RX ADMIN — SUCCINYLCHOLINE CHLORIDE 100 MG: 20 INJECTION, SOLUTION INTRAMUSCULAR; INTRAVENOUS at 07:11

## 2019-11-27 RX ADMIN — ONDANSETRON 4 MG: 2 INJECTION, SOLUTION INTRAMUSCULAR; INTRAVENOUS at 08:11

## 2019-11-27 RX ADMIN — NEOSTIGMINE METHYLSULFATE 4 MG: 1 INJECTION INTRAVENOUS at 09:11

## 2019-11-27 NOTE — INTERVAL H&P NOTE
The patient has been examined and the H&P has been reviewed:    I concur with the findings and no changes have occurred since H&P was written.   HIDA normal. Plan for robotic ventral hernia repair with possible excision of anterior abdominal wall mass.     Anesthesia/Surgery risks, benefits and alternative options discussed and understood by patient/family.        Patient Active Problem List   Diagnosis    Ventral hernia without obstruction or gangrene    Cholelithiasis without cholecystitis    Mass of anterior abdominal wall

## 2019-11-27 NOTE — ANESTHESIA POSTPROCEDURE EVALUATION
Anesthesia Post Evaluation    Patient: Raiza Mcpherson    Procedure(s) Performed: Procedure(s) (LRB):  XI ROBOTIC REPAIR, HERNIA, VENTRAL (N/A)  EXCISION, MASS, ABDOMEN (N/A)    Final Anesthesia Type: general    Patient location during evaluation: PACU  Level of consciousness: sedated  Post-procedure vital signs: reviewed and stable  Pain management: adequate  Airway patency: patent    PONV status at discharge: No PONV  Anesthetic complications: no      Cardiovascular status: blood pressure returned to baseline  Respiratory status: spontaneous ventilation  Hydration status: euvolemic  Follow-up needed           Vitals Value Taken Time   /63 11/27/2019 10:14 AM   Temp 36.4 °C (97.6 °F) 11/27/2019 10:12 AM   Pulse 75 11/27/2019 10:14 AM   Resp 19 11/27/2019 10:14 AM   SpO2 99 % 11/27/2019 10:14 AM   Vitals shown include unvalidated device data.      No case tracking events are documented in the log.      Pain/Kalpana Score: No data recorded

## 2019-11-27 NOTE — ANESTHESIA PREPROCEDURE EVALUATION
11/27/2019  Raiza Mcpherson is a 34 y.o., female.    Anesthesia Evaluation    I have reviewed the Patient Summary Reports.    I have reviewed the Nursing Notes.   I have reviewed the Medications.     Review of Systems  Anesthesia Hx:  No previous Anesthesia  Neg history of prior surgery.   Social:  Non-Smoker    Hematology/Oncology:     Oncology Normal    -- Denies Anemia:   EENT/Dental:EENT/Dental Normal   Cardiovascular:   Hypertension ECG has been reviewed.    Pulmonary:   Asthma    Renal/:  Renal/ Normal     Hepatic/GI:  Hepatic/GI Normal    Musculoskeletal:  Musculoskeletal Normal    Neurological:  Neurology Normal    Endocrine:  Endocrine Normal    Dermatological:  Skin Normal    Psych:  Psychiatric Normal           Physical Exam  General:  Obesity    Airway/Jaw/Neck:  Airway Findings: Mouth Opening: Normal Tongue: Normal  Mallampati: III  TM Distance: Normal, at least 6 cm      Dental:  Dental Findings: In tact   Chest/Lungs:  Chest/Lungs Findings: Clear to auscultation     Heart/Vascular:  Heart Findings: Rate: Normal     Abdomen:  Abdomen Findings:  Normal            Anesthesia Plan  Type of Anesthesia, risks & benefits discussed:  Anesthesia Type:  general  Patient's Preference:   Intra-op Monitoring Plan: standard ASA monitors  Intra-op Monitoring Plan Comments:   Post Op Pain Control Plan:   Post Op Pain Control Plan Comments:   Induction:   IV  Beta Blocker:  Patient is on a Beta-Blocker and has not received dose within the past 24 hours due to non-compliance or for other reasons (Patient should receive a perioperative dose or document why it is withheld). Perioperative Beta Blocker not given due to: Other (see comments)      Informed Consent: Patient understands risks and agrees with Anesthesia plan.  Questions answered. Anesthesia consent signed with patient.  ASA Score: 2     Day  of Surgery Review of History & Physical: I have interviewed and examined the patient. I have reviewed the patient's H&P dated:  There are no significant changes.          Ready For Surgery From Anesthesia Perspective.     Patient Active Problem List   Diagnosis    Ventral hernia without obstruction or gangrene    Cholelithiasis without cholecystitis    Mass of anterior abdominal wall       Chemistry        Component Value Date/Time     11/26/2019 1332    K 4.1 11/26/2019 1332     11/26/2019 1332    CO2 25 11/26/2019 1332    BUN 12 11/26/2019 1332    CREATININE 1.1 11/26/2019 1332     (H) 11/26/2019 1332        Component Value Date/Time    CALCIUM 9.4 11/26/2019 1332    ALKPHOS 104 08/16/2019 1200    AST 17 08/16/2019 1200    ALT 21 08/16/2019 1200    BILITOT 0.6 08/16/2019 1200    ESTGFRAFRICA >60 11/26/2019 1332    EGFRNONAA >60 11/26/2019 1332        Lab Results   Component Value Date    WBC 4.75 11/26/2019    HGB 11.6 (L) 11/26/2019    HCT 37.7 11/26/2019    MCV 86 11/26/2019     11/26/2019       Normal sinus rhythm  Normal ECG  When compared with ECG of 04-FEB-2014 15:04,  No significant change was found  Confirmed by TUNDE BARBOSA MD ([id number]) on 11/25/2019 4:41:10 PM

## 2019-11-27 NOTE — ANESTHESIA RELEASE NOTE
"Anesthesia Release from PACU Note    Patient: Raiza Mcpherson    Procedure(s) Performed: Procedure(s) (LRB):  XI ROBOTIC REPAIR, HERNIA, VENTRAL (N/A)  EXCISION, MASS, ABDOMEN (N/A)    Anesthesia type: general    Post pain: Adequate analgesia    Post assessment: no apparent anesthetic complications    Last Vitals:   Visit Vitals  /73 (BP Location: Right arm, Patient Position: Sitting)   Pulse 78   Temp 36.4 °C (97.6 °F) (Skin)   Resp 18   Ht 5' 5" (1.651 m)   Wt 95.5 kg (210 lb 8.6 oz)   LMP 11/26/2019 (Approximate)   SpO2 96%   Breastfeeding? No   BMI 35.04 kg/m²       Post vital signs: stable    Level of consciousness: sedated    Nausea/Vomiting: no nausea/no vomiting    Complications: none    Airway Patency: patent    Respiratory: unassisted, spontaneous ventilation    Cardiovascular: stable and blood pressure at baseline    Hydration: euvolemic  "

## 2019-11-27 NOTE — DISCHARGE SUMMARY
[facility]  Discharge Note  Short Stay    Admit Date: 11/27/2019    Discharge Date and Time: No discharge date for patient encounter.     Attending Physician: Angie Pagan MD     Discharge Provider: Angie Pagan    Diagnoses:  Active Hospital Problems    Diagnosis  POA    Ventral hernia without obstruction or gangrene [K43.9]  Yes      Resolved Hospital Problems   No resolved problems to display.     Procedure:  Robotic assisted ventral hernia repair, excision of intra-abdominal mass    Discharged Condition: stable    Hospital Course: Patient was admitted for an outpatient procedure and tolerated the procedure well with no complications.    Final Diagnoses: Same as principal problem.    Disposition: Home or Self Care    Follow up/Patient Instructions:    Medications:  Reconciled Home Medications:      Medication List      START taking these medications    HYDROcodone-acetaminophen 7.5-325 mg per tablet  Commonly known as:  NORCO  Take 1 tablet by mouth every 6 (six) hours as needed for Pain.     senna-docusate 8.6-50 mg 8.6-50 mg per tablet  Commonly known as:  Senna with Docusate Sodium  Take 1 tablet by mouth once daily. for 5 days        CHANGE how you take these medications    fluticasone propionate 50 mcg/actuation nasal spray  Commonly known as:  FLONASE  2 sprays by Each Nare route once daily.  What changed:    · when to take this  · reasons to take this     loratadine 10 mg tablet  Commonly known as:  CLARITIN  Take 1 tablet (10 mg total) by mouth once daily.  What changed:    · when to take this  · reasons to take this        CONTINUE taking these medications    benazepril-hydrochlorthiazide 10-12.5 mg Tab  Commonly known as:  LOTENSIN HCT  Take 1 tablet by mouth once daily.     labetalol 300 MG tablet  Commonly known as:  NORMODYNE  Take 300 mg by mouth 3 (three) times daily.     ranitidine 150 MG tablet  Commonly known as:  ZANTAC  Take 1 tablet (150 mg total) by mouth 2 (two)  times daily.        ASK your doctor about these medications    omeprazole 40 MG capsule  Commonly known as:  PRILOSEC  Take 1 capsule (40 mg total) by mouth once daily.          Discharge Procedure Orders   Diet Adult Regular     Notify your health care provider if you experience any of the following:  temperature >100.4     Notify your health care provider if you experience any of the following:  persistent nausea and vomiting or diarrhea     Lifting restrictions   Order Comments: No lifting > 15 lbs until cleared by surgeon     Notify your health care provider if you experience any of the following:  severe uncontrolled pain     Notify your health care provider if you experience any of the following:  redness, tenderness, or signs of infection (pain, swelling, redness, odor or green/yellow discharge around incision site)     No dressing needed     Activity as tolerated     Follow-up Information     Angie Pagan MD In 2 weeks.    Specialty:  General Surgery  Why:  For wound re-check  Contact information:  [address]  [place] [address]  [phone]                   Discharge Procedure Orders (must include Diet, Follow-up, Activity):   Discharge Procedure Orders (must include Diet, Follow-up, Activity)   Diet Adult Regular     Notify your health care provider if you experience any of the following:  temperature >100.4     Notify your health care provider if you experience any of the following:  persistent nausea and vomiting or diarrhea     Lifting restrictions   Order Comments: No lifting > 15 lbs until cleared by surgeon     Notify your health care provider if you experience any of the following:  severe uncontrolled pain     Notify your health care provider if you experience any of the following:  redness, tenderness, or signs of infection (pain, swelling, redness, odor or green/yellow discharge around incision site)     No dressing needed     Activity as tolerated

## 2019-11-27 NOTE — TRANSFER OF CARE
"Anesthesia Transfer of Care Note    Patient: Raiza Mcpherson    Procedure(s) Performed: Procedure(s) (LRB):  XI ROBOTIC REPAIR, HERNIA, VENTRAL (N/A)  EXCISION, MASS, ABDOMEN (N/A)    Patient location: PACU    Anesthesia Type: general    Transport from OR: Transported from OR on room air with adequate spontaneous ventilation    Post pain: adequate analgesia    Post assessment: no apparent anesthetic complications    Post vital signs: stable    Level of consciousness: sedated    Nausea/Vomiting: no nausea/vomiting    Complications: none    Transfer of care protocol was followed      Last vitals:   Visit Vitals  /73 (BP Location: Right arm, Patient Position: Sitting)   Pulse 78   Temp 36.4 °C (97.6 °F) (Skin)   Resp 18   Ht 5' 5" (1.651 m)   Wt 95.5 kg (210 lb 8.6 oz)   LMP 11/26/2019 (Approximate)   SpO2 96%   Breastfeeding? No   BMI 35.04 kg/m²     "

## 2019-11-27 NOTE — DISCHARGE INSTRUCTIONS
Wear abdominal binder as needed for comfort.  Dermabond to remain on the incision until the edges start to peel.  Once the edges released from the skin, okay to peel the Dermabond off at home.  Weight lifting restrictions:  No lifting greater than 15 lb until cleared by surgeon.  Take medications and stool softener as prescribed.  Okay to shower tomorrow

## 2019-11-27 NOTE — PLAN OF CARE
Ambulated without difficulty to preop. Pt nervous as this is her first surgery. Detailed instructions given regarding pain control, ambulation, lifting and infection control. Pt verb understanding. preop completed without incident.

## 2019-11-27 NOTE — OP NOTE
[facility] -   General Surgery  Operative Note    SUMMARY     Date of Procedure: 11/27/2019     Procedure: Procedure(s) (LRB):  XI ROBOTIC REPAIR, HERNIA, VENTRAL (N/A)  EXCISION, MASS, ABDOMEN (N/A)       Surgeon(s) and Role:     * Angie Pagan MD - Primary    Assisting Surgeon: None    Pre-Operative Diagnosis: Ventral hernia without obstruction or gangrene [K43.9]    Post-Operative Diagnosis: Post-Op Diagnosis Codes:     * Ventral hernia without obstruction or gangrene [K43.9]    Anesthesia: General    Drains, Packs, Catheters: none    Estimated Blood Loss (EBL): 5 mL           Implants:   Implant Name Type Inv. Item Serial No.  Lot No. LRB No. Used   MESH HERNIA LG 8X8 - [id number]  MESH HERNIA LG 8X8  [employer] [id number] N/A 1       Specimens:   Specimen (12h ago, onward)    None           Description of the Findings of the Procedure:  Repair of to you epigastric hernias, one 1 cm above the umbilicus 2 cm in size, one 4 cm above the umbilicus 3 cm in size; endometrium and noted adhered to the omentum within the supraumbilical defect.    Significant Surgical Tasks Conducted by the Assistant(s), if Applicable:  None    Complications: No    Procedure in detail: The patient was seen in the operating room and laid on the operating table in supine position. General endotracheal anesthesia was administered.  Bilateral arms were tucked and padded at pressure points.  Preoperative Carmona was placed. The patient's abdomen was prepped and draped in sterile fashion with ChloraPrep solution. A time-out was performed verifying patient identification, correct surgical site, IV antibiotic administration, another port in details with case.     The midline incision just inferior to the umbilicus was made. Using upward retraction with penetrating towel clips the peritoneum was accessed using a Veress needle.  Saline meniscus test confirmed peritoneal entry.  The abdomen was insufflated with carbon  dioxide.  The patient tolerated insufflation well. A left upper quadrant 8 mm incision was made. The abdomen was entered using the 8 mm robotic optical trocar. Laparoscope was inserted into the abdomen and no injuries were noted from initial trocar placement.      Additional ports were placed in line in the left lower quadrant with the camera port in between.  Once docking was complete, the hernia defect was inspected. She was noted to have a defect just superior to the umbilicus containing omentum and an endometrioma.  This was reduced.   A preperitoneal plane was created approximately 9 cm circumferentially. This dissection also cleared the contents of a falciform hernia approximately 4 cm superior to the umbilicus. Dissection was carried proximal to distal the hernia defect was reduced and a moderate amount of preperitoneal fat was encountered.   once tissue was cleared from the anterior abdominal wall, the hernia defects were measured to be 2 and 3 cm in size.   these were closed with a permanent V lock suture. An 8 cm round Ventralex mesh was chosen for adequate coverage of the hernia defect. This was introduced through an 12 mm trocar and secured to the fascia in the middle of the hernia defect repair using the V lock suture in place.      The mesh was sutured with strata Fix circumferentially.  the endometrium of his excised from the omentum and removed through an Endo-Catch retrieval bag.  This was sent for permanent pathology.  A tap block with Marcaine was performed in the left upper and right upper quadrants. Trocars removed under direct visualization and the abdomen was desufflated. Ultrasound examination confirmed no subcutaenous lesions.  Skin sites were anesthetized with Marcaine and closed with Monocryl. The sites were dressed in sterile fashion. The patient can return to her room in stable condition.    Condition: Stable    Disposition: PACU - hemodynamically stable.    Attestation: I performed the  procedure.    Angie Pagan

## 2019-11-28 NOTE — PROGRESS NOTES
Patient called with questions about her surgery  One. She asked if she could use a heating pad. I recommended only a heating pad with an automatic off timer to avoid burns  Two. She asked about taking a shower. I said as long as her incisions were glued she could take a shower  Three. She complained of pain. I told her she could take two of her pain pills as a one time dose to help with her pain

## 2019-12-01 NOTE — PROGRESS NOTES
Patient called with concerns about no bowel movement since surgery on Wednesday despite taking laxatives. I recommended that she try MiraLAX/Glycolax If that does not result in a bowel movement she was instructed to use magnesium citrate

## 2019-12-02 NOTE — ANESTHESIA POSTPROCEDURE EVALUATION
Anesthesia Post Evaluation    Patient: Raiza Mcpherson    Procedure(s) Performed: Procedure(s) (LRB):  XI ROBOTIC REPAIR, HERNIA, VENTRAL (N/A)  EXCISION, MASS, ABDOMEN (N/A)    Final Anesthesia Type: general    Patient location during evaluation: PACU  Patient participation: Yes- Able to Participate  Level of consciousness: awake  Post-procedure vital signs: reviewed and stable  Pain management: adequate  Airway patency: patent    PONV status at discharge: No PONV  Anesthetic complications: no      Cardiovascular status: blood pressure returned to baseline  Respiratory status: spontaneous ventilation  Hydration status: euvolemic  Follow-up not needed.          Vitals Value Taken Time   /63 12/2/2019  9:37 AM   Temp 36.4 12/2/2019  9:37 AM   Pulse 75 12/2/2019  9:37 AM   Resp 14 12/2/2019  9:37 AM   SpO2 99 12/2/2019  9:37 AM         Event Time     Out of Recovery 11:18:51          Pain/Kalpana Score: No data recorded

## 2019-12-03 ENCOUNTER — PATIENT MESSAGE (OUTPATIENT)
Dept: SURGERY | Facility: CLINIC | Age: 34
End: 2019-12-03

## 2019-12-03 RX ORDER — HYDROCODONE BITARTRATE AND ACETAMINOPHEN 7.5; 325 MG/1; MG/1
1 TABLET ORAL EVERY 6 HOURS PRN
Qty: 12 TABLET | Refills: 0 | Status: SHIPPED | OUTPATIENT
Start: 2019-12-03 | End: 2019-12-03

## 2019-12-03 RX ORDER — HYDROCODONE BITARTRATE AND ACETAMINOPHEN 7.5; 325 MG/1; MG/1
1 TABLET ORAL EVERY 6 HOURS PRN
Qty: 12 TABLET | Refills: 0 | Status: SHIPPED | OUTPATIENT
Start: 2019-12-03 | End: 2019-12-06

## 2019-12-04 LAB
FINAL PATHOLOGIC DIAGNOSIS: NORMAL
GROSS: NORMAL

## 2019-12-11 ENCOUNTER — OFFICE VISIT (OUTPATIENT)
Dept: SURGERY | Facility: CLINIC | Age: 34
End: 2019-12-11
Payer: COMMERCIAL

## 2019-12-11 VITALS
HEART RATE: 72 BPM | TEMPERATURE: 98 F | DIASTOLIC BLOOD PRESSURE: 77 MMHG | BODY MASS INDEX: 34.71 KG/M2 | SYSTOLIC BLOOD PRESSURE: 134 MMHG | WEIGHT: 208.56 LBS

## 2019-12-11 DIAGNOSIS — Z98.890 POST-OPERATIVE STATE: Primary | ICD-10-CM

## 2019-12-11 PROCEDURE — 99024 POSTOP FOLLOW-UP VISIT: CPT | Mod: S$GLB,,, | Performed by: PHYSICIAN ASSISTANT

## 2019-12-11 PROCEDURE — 99024 PR POST-OP FOLLOW-UP VISIT: ICD-10-PCS | Mod: S$GLB,,, | Performed by: PHYSICIAN ASSISTANT

## 2019-12-11 PROCEDURE — 99999 PR PBB SHADOW E&M-EST. PATIENT-LVL III: ICD-10-PCS | Mod: PBBFAC,,, | Performed by: PHYSICIAN ASSISTANT

## 2019-12-11 PROCEDURE — 99999 PR PBB SHADOW E&M-EST. PATIENT-LVL III: CPT | Mod: PBBFAC,,, | Performed by: PHYSICIAN ASSISTANT

## 2019-12-13 NOTE — PROGRESS NOTES
Raiza Mcpherson is status post robotic ventral hernia repair and presents today for follow-up care.  She is doing well and has no complaints.     PE: Abdomen is soft, non-tender, non-distended.  Incisions clean, dry, and intact. Seroma of hernia sac noted with mild associated tenderness .         A/P:  Normal post-operative course.    The patient is instructed to avoid heavy lifting until 6 weeks after the surgery date.  Return to clinic as needed.

## 2019-12-16 ENCOUNTER — PATIENT MESSAGE (OUTPATIENT)
Dept: GASTROENTEROLOGY | Facility: CLINIC | Age: 34
End: 2019-12-16

## 2019-12-17 ENCOUNTER — OFFICE VISIT (OUTPATIENT)
Dept: GASTROENTEROLOGY | Facility: CLINIC | Age: 34
End: 2019-12-17
Payer: COMMERCIAL

## 2019-12-17 ENCOUNTER — HOSPITAL ENCOUNTER (OUTPATIENT)
Dept: RADIOLOGY | Facility: HOSPITAL | Age: 34
Discharge: HOME | End: 2019-12-17
Attending: PHYSICIAN ASSISTANT
Payer: COMMERCIAL

## 2019-12-17 VITALS
WEIGHT: 208.13 LBS | SYSTOLIC BLOOD PRESSURE: 126 MMHG | HEIGHT: 65 IN | HEART RATE: 78 BPM | BODY MASS INDEX: 34.68 KG/M2 | DIASTOLIC BLOOD PRESSURE: 84 MMHG

## 2019-12-17 DIAGNOSIS — K21.9 GASTROESOPHAGEAL REFLUX DISEASE, ESOPHAGITIS PRESENCE NOT SPECIFIED: ICD-10-CM

## 2019-12-17 DIAGNOSIS — K59.00 CONSTIPATION, UNSPECIFIED CONSTIPATION TYPE: ICD-10-CM

## 2019-12-17 DIAGNOSIS — R11.2 NAUSEA AND VOMITING, INTRACTABILITY OF VOMITING NOT SPECIFIED, UNSPECIFIED VOMITING TYPE: ICD-10-CM

## 2019-12-17 DIAGNOSIS — R10.10 UPPER ABDOMINAL PAIN: ICD-10-CM

## 2019-12-17 DIAGNOSIS — R10.10 UPPER ABDOMINAL PAIN: Primary | ICD-10-CM

## 2019-12-17 PROCEDURE — 99204 PR OFFICE/OUTPT VISIT, NEW, LEVL IV, 45-59 MIN: ICD-10-PCS | Mod: S$GLB,,, | Performed by: PHYSICIAN ASSISTANT

## 2019-12-17 PROCEDURE — 99999 PR PBB SHADOW E&M-EST. PATIENT-LVL III: ICD-10-PCS | Mod: PBBFAC,,, | Performed by: PHYSICIAN ASSISTANT

## 2019-12-17 PROCEDURE — 74019 RADEX ABDOMEN 2 VIEWS: CPT | Mod: 26,,, | Performed by: RADIOLOGY

## 2019-12-17 PROCEDURE — 74019 RADEX ABDOMEN 2 VIEWS: CPT | Mod: TC

## 2019-12-17 PROCEDURE — 99999 PR PBB SHADOW E&M-EST. PATIENT-LVL III: CPT | Mod: PBBFAC,,, | Performed by: PHYSICIAN ASSISTANT

## 2019-12-17 PROCEDURE — 74019 XR ABDOMEN FLAT AND ERECT: ICD-10-PCS | Mod: 26,,, | Performed by: RADIOLOGY

## 2019-12-17 PROCEDURE — 99204 OFFICE O/P NEW MOD 45 MIN: CPT | Mod: S$GLB,,, | Performed by: PHYSICIAN ASSISTANT

## 2019-12-17 NOTE — PROGRESS NOTES
Clinic Consult:  [facility] Consultation Note    Reason for Consult:  The primary encounter diagnosis was Upper abdominal pain. Diagnoses of Nausea and vomiting, intractability of vomiting not specified, unspecified vomiting type, Gastroesophageal reflux disease, esophagitis presence not specified, and Constipation, unspecified constipation type were also pertinent to this visit.    PCP: Primary Doctor No   No address on file    CC: Abdominal Pain      HPI:  This is a 34 y.o. female here for evaluation of the above. Had a baby 6 months ago and symptoms of abdominal pain and vomiting began shortly after.. OB thought she had a fibroid, but CT scan showed she had an umbilical hernia . Hernia repair day before thanksgiving. She was also sent for HIDA which was normal. No nausea but is vomiting. Pain in stomach and back. Lasts for a couple of hours at a time. Aching pain. Was prescribed Ranitidine and Prilosec. She is taking Ranitidine but was unaware of omeprazole prescription. Episodes getting closer together. Mostly at night. All began after giving birth. Patient also reports having 1-2 very small stools per day on average, although she will go a few days at a time without a stool. She denies any blood or black in her stool.     *Recent CT that confirmed her hernia had addendum that stated:  There is a questionable subtle area of increased enhancement on the portal venous phase within the subcapsular region of the lateral segment of the left hepatic lobe which measures approximately 18 mm is questionably seen on series 601, image 32 and series 3, image number 42.  No arterial phase images were obtained.  This lesion is iso to possibly very minimally hypodense on the noncontrast images.  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    Patient was unaware of these findings.      Review of Systems   Constitutional: Negative for appetite change, fatigue and fever.   HENT:  Negative for trouble swallowing.    Eyes: Negative for visual disturbance.   Respiratory: Negative for cough, chest tightness and shortness of breath.    Cardiovascular: Negative for chest pain.   Gastrointestinal: Positive for abdominal pain (upper abdominal pain), nausea and vomiting. Negative for blood in stool, constipation and diarrhea.   Genitourinary: Negative for dysuria and hematuria.   Musculoskeletal: Positive for back pain (with the episodes mentioned in HPI). Negative for myalgias.   Skin: Negative for rash.   Neurological: Negative for dizziness, weakness and light-headedness.   Psychiatric/Behavioral: Negative for dysphoric mood. The patient is not nervous/anxious.         Medical History:   Past Medical History:   Diagnosis Date    Asthma     childhood    Hypertension        Surgical History:   Past Surgical History:   Procedure Laterality Date    EXCISION OF MASS OF ABDOMEN N/A 11/27/2019    Procedure: EXCISION, MASS, ABDOMEN;  Surgeon: Angie Pagan MD;  [facility]: [facility] OR;  Service: General;  Laterality: N/A;  XI ROBOTIC      ROBOT-ASSISTED REPAIR OF VENTRAL HERNIA USING DA PHIL XI N/A 11/27/2019    Procedure: XI ROBOTIC REPAIR, HERNIA, VENTRAL;  Surgeon: Angie Pagan MD;  Location: [facility] OR;  Service: General;  Laterality: N/A;       Family History:    Family History   Problem Relation Age of Onset    Hypertension Father     Kidney disease Paternal Grandmother     Cancer Paternal Grandmother     Kidney disease Paternal Grandfather     Vision loss Paternal Grandfather     Hypertension Mother        Social History:   Social History     Socioeconomic History    Marital status: Single     Spouse name: Not on file    Number of children: Not on file    Years of education: Not on file    Highest education level: Not on file   Occupational History     Employer: [employer]   Social Needs    Financial resource strain: Not hard at all    Food insecurity:      Worry: Never true     Inability: Never true    Transportation needs:     Medical: No     Non-medical: No   Tobacco Use    Smoking status: Never Smoker    Smokeless tobacco: Never Used   Substance and Sexual Activity    Alcohol use: No     Frequency: Never     Binge frequency: Never    Drug use: No    Sexual activity: Not Currently   Lifestyle    Physical activity:     Days per week: 3 days     Minutes per session: 20 min    Stress: To some extent   Relationships    Social connections:     Talks on phone: More than three times a week     Gets together: More than three times a week     Attends Orthodoxy service: Not on file     Active member of club or organization: Yes     Attends meetings of clubs or organizations: 1 to 4 times per year     Relationship status: Never    Other Topics Concern    Not on file   Social History Narrative    Not on file       Allergies:   Review of patient's allergies indicates:  No Known Allergies    Home Medications:   Current Outpatient Medications on File Prior to Visit   Medication Sig Dispense Refill    benazepril-hydrochlorthiazide (LOTENSIN HCT) 10-12.5 mg Tab Take 1 tablet by mouth once daily.      labetalol (NORMODYNE) 300 MG tablet Take 300 mg by mouth 3 (three) times daily.      norgestrel-ethinyl estradiol (LO/OVRAL) 0.3-30 mg-mcg per tablet Take 1 tablet by mouth once daily.      ranitidine (ZANTAC) 150 MG tablet Take 1 tablet (150 mg total) by mouth 2 (two) times daily. 60 tablet 11    fluticasone (FLONASE) 50 mcg/actuation nasal spray 2 sprays by Each Nare route once daily. (Patient not taking: Reported on 12/17/2019) 16 g 0    loratadine (CLARITIN) 10 mg tablet Take 1 tablet (10 mg total) by mouth once daily. (Patient taking differently: Take 10 mg by mouth daily as needed. ) 30 tablet 1    omeprazole (PRILOSEC) 40 MG capsule Take 1 capsule (40 mg total) by mouth once daily. (Patient not taking: Reported on 12/17/2019) 30 capsule 11     No current  "facility-administered medications on file prior to visit.        Physical Exam:  /84   Pulse 78   Ht 5' 5" (1.651 m)   Wt 94.4 kg (208 lb 1.8 oz)   LMP 11/26/2019 (Approximate)   BMI 34.63 kg/m²   Body mass index is 34.63 kg/m².  Physical Exam   Constitutional: She is oriented to person, place, and time. She appears well-developed and well-nourished. No distress.   HENT:   Head: Normocephalic and atraumatic.   Eyes: EOM are normal. No scleral icterus.   Neck: Normal range of motion.   Cardiovascular: Normal rate, regular rhythm and normal heart sounds.   No murmur heard.  Pulmonary/Chest: Effort normal and breath sounds normal. No respiratory distress.   Abdominal: Soft. Bowel sounds are normal. She exhibits no distension and no mass. There is tenderness (Right abdomen, left abdomen - mild). There is no guarding.   Trochar incisions well healed.   Musculoskeletal: Normal range of motion. She exhibits no deformity.   Neurological: She is alert and oriented to person, place, and time.   Skin: Skin is warm and dry. She is not diaphoretic.   Psychiatric: She has a normal mood and affect. Her behavior is normal. Judgment and thought content normal.         Labs: Pertinent labs reviewed.  Endoscopy: none  CRC Screening: none  Anticoagulation: none    Assessment:  1. Upper abdominal pain    2. Nausea and vomiting, intractability of vomiting not specified, unspecified vomiting type    3. Gastroesophageal reflux disease, esophagitis presence not specified    4. Constipation, unspecified constipation type         Recommendations:  -Will complete abdominal Xray today to rule out constipation as the cause of discomfort  -If Xray normal, will schedule gastric emptying and H pylori stool.  -Instructed patient to stay off of PPI and Ranitidine for 2 weeks before submitting a stool  -Will complete Triple phase CT in 3 months for monitoring of possible liver lesion.    Upper abdominal pain  -     NM Gastric Emptying; " Future; Expected date: 12/17/2019  -     H. pylori antigen, stool; Future; Expected date: 12/17/2019  -     X-Ray Abdomen Flat And Erect; Future; Expected date: 12/17/2019    Nausea and vomiting, intractability of vomiting not specified, unspecified vomiting type  -     NM Gastric Emptying; Future; Expected date: 12/17/2019  -     H. pylori antigen, stool; Future; Expected date: 12/17/2019  -     X-Ray Abdomen Flat And Erect; Future; Expected date: 12/17/2019    Gastroesophageal reflux disease, esophagitis presence not specified  -     NM Gastric Emptying; Future; Expected date: 12/17/2019  -     H. pylori antigen, stool; Future; Expected date: 12/17/2019  -     X-Ray Abdomen Flat And Erect; Future; Expected date: 12/17/2019    Constipation, unspecified constipation type  -     X-Ray Abdomen Flat And Erect; Future; Expected date: 12/17/2019        No follow-ups on file.    Thank you so much for allowing me to participate in the care of Raiza Guevara PA-C

## 2020-01-16 ENCOUNTER — PATIENT MESSAGE (OUTPATIENT)
Dept: RESEARCH | Facility: HOSPITAL | Age: 35
End: 2020-01-16

## 2020-02-04 ENCOUNTER — OFFICE VISIT (OUTPATIENT)
Dept: INTERNAL MEDICINE | Facility: CLINIC | Age: 35
End: 2020-02-04
Payer: COMMERCIAL

## 2020-02-04 VITALS
TEMPERATURE: 99 F | WEIGHT: 209.19 LBS | HEIGHT: 65 IN | OXYGEN SATURATION: 100 % | HEART RATE: 74 BPM | DIASTOLIC BLOOD PRESSURE: 76 MMHG | BODY MASS INDEX: 34.85 KG/M2 | SYSTOLIC BLOOD PRESSURE: 112 MMHG

## 2020-02-04 DIAGNOSIS — I10 ESSENTIAL HYPERTENSION: Primary | ICD-10-CM

## 2020-02-04 PROCEDURE — 99214 OFFICE O/P EST MOD 30 MIN: CPT | Mod: S$GLB,,, | Performed by: NURSE PRACTITIONER

## 2020-02-04 PROCEDURE — 99999 PR PBB SHADOW E&M-EST. PATIENT-LVL III: ICD-10-PCS | Mod: PBBFAC,,, | Performed by: NURSE PRACTITIONER

## 2020-02-04 PROCEDURE — 99999 PR PBB SHADOW E&M-EST. PATIENT-LVL III: CPT | Mod: PBBFAC,,, | Performed by: NURSE PRACTITIONER

## 2020-02-04 PROCEDURE — 99214 PR OFFICE/OUTPT VISIT, EST, LEVL IV, 30-39 MIN: ICD-10-PCS | Mod: S$GLB,,, | Performed by: NURSE PRACTITIONER

## 2020-02-04 RX ORDER — LABETALOL 300 MG/1
300 TABLET, FILM COATED ORAL 3 TIMES DAILY
Qty: 30 TABLET | Refills: 5 | Status: SHIPPED | OUTPATIENT
Start: 2020-02-04 | End: 2020-07-30 | Stop reason: SDUPTHER

## 2020-02-04 RX ORDER — BENAZEPRIL HYDROCHLORIDE AND HYDROCHLOROTHIAZIDE 10; 12.5 MG/1; MG/1
1 TABLET ORAL DAILY
Qty: 30 TABLET | Refills: 5 | Status: SHIPPED | OUTPATIENT
Start: 2020-02-04 | End: 2020-07-30 | Stop reason: SDUPTHER

## 2020-02-04 NOTE — PROGRESS NOTES
Subjective:       Patient ID: Raiza Mcpherson is a 34 y.o. female.    Chief Complaint: Dizziness; Hypertension; and Headache    Patient presents for medication refills.  Reports blood pressure was 186/127 on yesterday.  Reports having blurred vision, dizziness, and headache on yesterday.  [facility] provided patient with an emergency prescription.  Blood pressure improved.  Symptoms improved but does not feel 100%.     Review of Systems   Constitutional: Negative for chills and fever.   Respiratory: Negative for cough and shortness of breath.    Gastrointestinal: Negative for blood in stool.   Musculoskeletal: Negative for arthralgias and gait problem.   Skin: Negative for color change and rash.   Psychiatric/Behavioral: Negative for agitation and confusion.       Objective:      Physical Exam   Constitutional: She is oriented to person, place, and time. Vital signs are normal. She appears well-developed and well-nourished.   HENT:   Head: Normocephalic and atraumatic.   Eyes: Pupils are equal, round, and reactive to light. EOM are normal.   Neck: Normal range of motion.   Cardiovascular: Normal rate and regular rhythm.   Pulmonary/Chest: Effort normal and breath sounds normal.   Musculoskeletal: Normal range of motion.   Neurological: She is alert and oriented to person, place, and time. She has normal strength. No cranial nerve deficit.   Skin: Skin is warm.   Psychiatric: She has a normal mood and affect. Her behavior is normal.       Assessment:       1. Essential hypertension        Plan:         Essential hypertension  -     labetalol (NORMODYNE) 300 MG tablet; Take 1 tablet (300 mg total) by mouth 3 (three) times daily.  Dispense: 30 tablet; Refill: 5  -     benazepril-hydrochlorthiazide (LOTENSIN HCT) 10-12.5 mg Tab; Take 1 tablet by mouth once daily.  Dispense: 30 tablet; Refill: 5        Medications refilled.  Continue to monitor symptoms.  Follow up with PCP within 6 months or sooner if needed.

## 2020-02-04 NOTE — PATIENT INSTRUCTIONS

## 2020-04-01 ENCOUNTER — OFFICE VISIT (OUTPATIENT)
Dept: INTERNAL MEDICINE | Facility: CLINIC | Age: 35
End: 2020-04-01
Payer: COMMERCIAL

## 2020-04-01 VITALS — SYSTOLIC BLOOD PRESSURE: 112 MMHG | DIASTOLIC BLOOD PRESSURE: 76 MMHG

## 2020-04-01 DIAGNOSIS — I10 ESSENTIAL HYPERTENSION: ICD-10-CM

## 2020-04-01 DIAGNOSIS — Z87.09 HISTORY OF ASTHMA: Primary | ICD-10-CM

## 2020-04-01 PROCEDURE — 99213 OFFICE O/P EST LOW 20 MIN: CPT | Mod: 95,,, | Performed by: FAMILY MEDICINE

## 2020-04-01 PROCEDURE — 99213 PR OFFICE/OUTPT VISIT, EST, LEVL III, 20-29 MIN: ICD-10-PCS | Mod: 95,,, | Performed by: FAMILY MEDICINE

## 2020-04-01 RX ORDER — ALBUTEROL SULFATE 90 UG/1
2 AEROSOL, METERED RESPIRATORY (INHALATION) EVERY 6 HOURS PRN
Qty: 18 G | Refills: 0 | Status: SHIPPED | OUTPATIENT
Start: 2020-04-01 | End: 2020-07-30 | Stop reason: SDUPTHER

## 2020-04-01 NOTE — ASSESSMENT & PLAN NOTE
Onset childhood with exacerbation not requiring hospitalization. Albuterol use 2 years ago. Not taking antihistamine. Denies sob. rx albuterol inhaler. Monitor use. Advised of adverse effects of albuterol. Pt voiced understanding. Note patient taking acei for htn

## 2020-04-01 NOTE — PROGRESS NOTES
Subjective:       Patient ID: Raiza Mcpherson is a 35 y.o. female.    Chief Complaint: med refill (albuterol), wheezing  HPI  H/o childhood asthma with exacerbation not requiring hospitalization  Characterized as hearing at center of chest  Denies fever, SOB  A/w wheezing   Onset yesterday  Denies taking antihistamine  Reports initiating fish oil, a/w diarrhea  Also taking mucinex  Last time using albuterol inhaler 2 years ago   Denies nighttime awakening  Denies h/o smoking  Review of Systems   Constitutional: Negative for fever.   Respiratory: Positive for wheezing. Negative for shortness of breath.    Gastrointestinal: Positive for diarrhea.        Objective:   /76     BP Readings from Last 3 Encounters:   04/01/20 112/76   02/04/20 112/76   12/17/19 126/84       No results found for: LABA1C, HGBA1C    Physical Exam   Constitutional: She appears well-nourished. No distress.   HENT:   Head: Normocephalic and atraumatic.   Neck: Normal range of motion.   Pulmonary/Chest: Effort normal. No respiratory distress.   Neurological: She is alert.   Psychiatric: She has a normal mood and affect. Her behavior is normal. Judgment and thought content normal.     Assessment:     1. History of asthma    2. Essential hypertension      Plan:     Problem List Items Addressed This Visit        Pulmonary    History of asthma - Primary    Current Assessment & Plan     Onset childhood with exacerbation not requiring hospitalization. Albuterol use 2 years ago. Not taking antihistamine. Denies sob. rx albuterol inhaler. Monitor use. Advised of adverse effects of albuterol. Pt voiced understanding.           Relevant Medications    albuterol (PROVENTIL/VENTOLIN HFA) 90 mcg/actuation inhaler       Cardiac/Vascular    Essential hypertension    Current Assessment & Plan     Monitored and evaluated medical condition. Stable.  Reports compliance to meds.  No adverse effects to meds.  Continue meds and monitor.                  The patient location is: patient's home  The chief complaint leading to consultation is: med refills htn  Visit type: Virtual visit with synchronous audio and video  Total time spent with patient: 3:00-3:11  Each patient to whom he or she provides medical services by telemedicine is:  (1) informed of the relationship between the physician and patient and the respective role of any other health care provider with respect to management of the patient; and (2) notified that he or she may decline to receive medical services by telemedicine and may withdraw from such care at any time.    Notes: will rx albuterol d/t h/o asthmax with similar sx  Follow up if symptoms worsen or fail to improve.

## 2020-07-30 ENCOUNTER — OFFICE VISIT (OUTPATIENT)
Dept: INTERNAL MEDICINE | Facility: CLINIC | Age: 35
End: 2020-07-30
Payer: COMMERCIAL

## 2020-07-30 DIAGNOSIS — I10 ESSENTIAL HYPERTENSION: Primary | ICD-10-CM

## 2020-07-30 DIAGNOSIS — Z30.9 ENCOUNTER FOR CONTRACEPTIVE MANAGEMENT, UNSPECIFIED TYPE: ICD-10-CM

## 2020-07-30 DIAGNOSIS — Z87.09 HISTORY OF ASTHMA: ICD-10-CM

## 2020-07-30 PROCEDURE — 99214 OFFICE O/P EST MOD 30 MIN: CPT | Mod: 95,,, | Performed by: FAMILY MEDICINE

## 2020-07-30 PROCEDURE — 99214 PR OFFICE/OUTPT VISIT, EST, LEVL IV, 30-39 MIN: ICD-10-PCS | Mod: 95,,, | Performed by: FAMILY MEDICINE

## 2020-07-30 RX ORDER — ALBUTEROL SULFATE 90 UG/1
2 AEROSOL, METERED RESPIRATORY (INHALATION) EVERY 6 HOURS PRN
Qty: 18 G | Refills: 6 | Status: SHIPPED | OUTPATIENT
Start: 2020-07-30 | End: 2021-03-31

## 2020-07-30 RX ORDER — LABETALOL 300 MG/1
300 TABLET, FILM COATED ORAL 3 TIMES DAILY
Qty: 30 TABLET | Refills: 5 | Status: SHIPPED | OUTPATIENT
Start: 2020-07-30 | End: 2021-07-02 | Stop reason: SDUPTHER

## 2020-07-30 RX ORDER — BENAZEPRIL HYDROCHLORIDE AND HYDROCHLOROTHIAZIDE 10; 12.5 MG/1; MG/1
1 TABLET ORAL DAILY
Qty: 30 TABLET | Refills: 5 | Status: SHIPPED | OUTPATIENT
Start: 2020-07-30 | End: 2020-11-05 | Stop reason: SDUPTHER

## 2020-07-30 RX ORDER — DEXTROMETHORPHAN HBR AND PYRILAMINE MALEATE 30; 30 MG/1; MG/1
1 TABLET ORAL 3 TIMES DAILY PRN
Qty: 20 TABLET | Refills: 1 | Status: SHIPPED | OUTPATIENT
Start: 2020-07-30 | End: 2021-03-31

## 2020-07-31 NOTE — PROGRESS NOTES
The patient location is: work  The chief complaint leading to consultation is: refills    Visit type: audiovisual    Face to Face time with patient: 6 minutes  8 minutes of total time spent on the encounter, which includes face to face time and non-face to face time preparing to see the patient (eg, review of tests), Obtaining and/or reviewing separately obtained history, Documenting clinical information in the electronic or other health record, Independently interpreting results (not separately reported) and communicating results to the patient/family/caregiver, or Care coordination (not separately reported).         Each patient to whom he or she provides medical services by telemedicine is:  (1) informed of the relationship between the physician and patient and the respective role of any other health care provider with respect to management of the patient; and (2) notified that he or she may decline to receive medical services by telemedicine and may withdraw from such care at any time.    Notes:     Subjective:      Patient ID: Raiza Mcpherson is a 35 y.o. female.    Chief Complaint: No chief complaint on file.      Virtual visit for refills, patient has been on both of her blood pressure medications long term, blood pressure well controlled on them.   She also has asthma, requesting refill on rescue inhaler, has had increased PND and nasal congestion and wheezing in past few days.   She has no other complaints today.     Cough  Associated symptoms include nasal congestion and postnasal drip. Pertinent negatives include no chest pain, chills, ear congestion, ear pain, fever, headaches, heartburn, hemoptysis, myalgias, rash, rhinorrhea, sore throat, shortness of breath, sweats, weight loss or wheezing. Her past medical history is significant for asthma. There is no history of bronchiectasis, bronchitis, COPD, emphysema, environmental allergies or pneumonia.     Review of Systems   Constitutional: Negative  for chills, fever and weight loss.   HENT: Positive for postnasal drip. Negative for ear pain, rhinorrhea and sore throat.    Respiratory: Positive for cough. Negative for hemoptysis, shortness of breath and wheezing.    Cardiovascular: Negative for chest pain.   Gastrointestinal: Negative for heartburn.   Musculoskeletal: Negative for myalgias.   Skin: Negative for rash.   Allergic/Immunologic: Negative for environmental allergies.   Neurological: Negative for headaches.     Past Medical History:   Diagnosis Date    Asthma     childhood    Hypertension           Past Surgical History:   Procedure Laterality Date    EXCISION OF MASS OF ABDOMEN N/A 11/27/2019    Procedure: EXCISION, MASS, ABDOMEN;  Surgeon: Angie Pagan MD;  Location: [facility] OR;  Service: General;  Laterality: N/A;  XI ROBOTIC      ROBOT-ASSISTED REPAIR OF VENTRAL HERNIA USING DA PHIL XI N/A 11/27/2019    Procedure: XI ROBOTIC REPAIR, HERNIA, VENTRAL;  Surgeon: Angie Pagan MD;  Location: [facility] OR;  Service: General;  Laterality: N/A;     Family History   Problem Relation Age of Onset    Hypertension Father     Kidney disease Paternal Grandmother     Cancer Paternal Grandmother     Kidney disease Paternal Grandfather     Vision loss Paternal Grandfather     Hypertension Mother      Social History     Socioeconomic History    Marital status: Single     Spouse name: Not on file    Number of children: Not on file    Years of education: Not on file    Highest education level: Not on file   Occupational History     Employer: [employer]   Social Needs    Financial resource strain: Not hard at all    Food insecurity     Worry: Never true     Inability: Never true    Transportation needs     Medical: No     Non-medical: No   Tobacco Use    Smoking status: Never Smoker    Smokeless tobacco: Never Used   Substance and Sexual Activity    Alcohol use: No     Frequency: Never     Binge frequency: Never    Drug  use: No    Sexual activity: Not Currently   Lifestyle    Physical activity     Days per week: 3 days     Minutes per session: 20 min    Stress: To some extent   Relationships    Social connections     Talks on phone: More than three times a week     Gets together: More than three times a week     Attends Jewish service: Not on file     Active member of club or organization: Yes     Attends meetings of clubs or organizations: 1 to 4 times per year     Relationship status: Never    Other Topics Concern    Not on file   Social History Narrative    Not on file     Review of patient's allergies indicates:  No Known Allergies    Objective:       There were no vitals taken for this visit.  Physical Exam  Constitutional:       General: She is not in acute distress.     Appearance: Normal appearance. She is well-developed. She is not ill-appearing or diaphoretic.   Pulmonary:      Effort: No respiratory distress.   Neurological:      Mental Status: She is alert and oriented to person, place, and time.   Psychiatric:         Mood and Affect: Mood normal.         Behavior: Behavior normal.         Thought Content: Thought content normal.         Judgment: Judgment normal.       Assessment:     1. Essential hypertension    2. History of asthma    3. Encounter for contraceptive management, unspecified type      Plan:   Essential hypertension  -     labetaloL (NORMODYNE) 300 MG tablet; Take 1 tablet (300 mg total) by mouth 3 (three) times daily.  Dispense: 30 tablet; Refill: 5  -     benazepril-hydrochlorthiazide (LOTENSIN HCT) 10-12.5 mg Tab; Take 1 tablet by mouth once daily.  Dispense: 30 tablet; Refill: 5    History of asthma  -     albuterol (PROVENTIL/VENTOLIN HFA) 90 mcg/actuation inhaler; Inhale 2 puffs into the lungs every 6 (six) hours as needed for Wheezing.  Dispense: 18 g; Refill: 6    Encounter for contraceptive management, unspecified type    Other orders  -     norgestrel-ethinyl estradioL  (LO/OVRAL) 0.3-30 mg-mcg per tablet; Take 1 tablet by mouth once daily.  Dispense: 30 tablet; Refill: 11  -     pyrilamine-dextromethorphan (CAPRON DMT) 30-30 mg Tab; Take 1 tablet by mouth 3 (three) times daily as needed (congestion).  Dispense: 20 tablet; Refill: 1    patient does not want any screening/labs at this time - wants to avoid coming in because of covid concerns  Medication List with Changes/Refills   New Medications    NORGESTREL-ETHINYL ESTRADIOL (LO/OVRAL) 0.3-30 MG-MCG PER TABLET    Take 1 tablet by mouth once daily.    PYRILAMINE-DEXTROMETHORPHAN (CAPRON DMT) 30-30 MG TAB    Take 1 tablet by mouth 3 (three) times daily as needed (congestion).   Changed and/or Refilled Medications    Modified Medication Previous Medication    ALBUTEROL (PROVENTIL/VENTOLIN HFA) 90 MCG/ACTUATION INHALER albuterol (PROVENTIL/VENTOLIN HFA) 90 mcg/actuation inhaler       Inhale 2 puffs into the lungs every 6 (six) hours as needed for Wheezing.    Inhale 2 puffs into the lungs every 6 (six) hours as needed for Wheezing.    BENAZEPRIL-HYDROCHLORTHIAZIDE (LOTENSIN HCT) 10-12.5 MG TAB benazepril-hydrochlorthiazide (LOTENSIN HCT) 10-12.5 mg Tab       Take 1 tablet by mouth once daily.    Take 1 tablet by mouth once daily.    LABETALOL (NORMODYNE) 300 MG TABLET labetalol (NORMODYNE) 300 MG tablet       Take 1 tablet (300 mg total) by mouth 3 (three) times daily.    Take 1 tablet (300 mg total) by mouth 3 (three) times daily.

## 2020-09-10 ENCOUNTER — OFFICE VISIT (OUTPATIENT)
Dept: INTERNAL MEDICINE | Facility: CLINIC | Age: 35
End: 2020-09-10
Payer: COMMERCIAL

## 2020-09-10 VITALS
TEMPERATURE: 97 F | HEART RATE: 93 BPM | OXYGEN SATURATION: 98 % | SYSTOLIC BLOOD PRESSURE: 121 MMHG | HEIGHT: 65 IN | DIASTOLIC BLOOD PRESSURE: 80 MMHG | WEIGHT: 216.5 LBS | BODY MASS INDEX: 36.07 KG/M2

## 2020-09-10 DIAGNOSIS — J01.00 ACUTE NON-RECURRENT MAXILLARY SINUSITIS: Primary | ICD-10-CM

## 2020-09-10 PROCEDURE — 99214 OFFICE O/P EST MOD 30 MIN: CPT | Mod: 25,S$GLB,, | Performed by: NURSE PRACTITIONER

## 2020-09-10 PROCEDURE — 99999 PR PBB SHADOW E&M-EST. PATIENT-LVL IV: CPT | Mod: PBBFAC,,, | Performed by: NURSE PRACTITIONER

## 2020-09-10 PROCEDURE — 96372 THER/PROPH/DIAG INJ SC/IM: CPT | Mod: S$GLB,,, | Performed by: NURSE PRACTITIONER

## 2020-09-10 PROCEDURE — 99999 PR PBB SHADOW E&M-EST. PATIENT-LVL IV: ICD-10-PCS | Mod: PBBFAC,,, | Performed by: NURSE PRACTITIONER

## 2020-09-10 PROCEDURE — 99214 PR OFFICE/OUTPT VISIT, EST, LEVL IV, 30-39 MIN: ICD-10-PCS | Mod: 25,S$GLB,, | Performed by: NURSE PRACTITIONER

## 2020-09-10 PROCEDURE — 96372 PR INJECTION,THERAP/PROPH/DIAG2ST, IM OR SUBCUT: ICD-10-PCS | Mod: S$GLB,,, | Performed by: NURSE PRACTITIONER

## 2020-09-10 RX ORDER — AMOXICILLIN AND CLAVULANATE POTASSIUM 875; 125 MG/1; MG/1
1 TABLET, FILM COATED ORAL EVERY 12 HOURS
Qty: 14 TABLET | Refills: 0 | Status: SHIPPED | OUTPATIENT
Start: 2020-09-10 | End: 2021-03-23

## 2020-09-10 RX ORDER — BETAMETHASONE SODIUM PHOSPHATE AND BETAMETHASONE ACETATE 3; 3 MG/ML; MG/ML
6 INJECTION, SUSPENSION INTRA-ARTICULAR; INTRALESIONAL; INTRAMUSCULAR; SOFT TISSUE ONCE
Status: COMPLETED | OUTPATIENT
Start: 2020-09-10 | End: 2020-09-10

## 2020-09-10 RX ADMIN — BETAMETHASONE SODIUM PHOSPHATE AND BETAMETHASONE ACETATE 6 MG: 3; 3 INJECTION, SUSPENSION INTRA-ARTICULAR; INTRALESIONAL; INTRAMUSCULAR; SOFT TISSUE at 11:09

## 2020-09-10 NOTE — PROGRESS NOTES
Subjective:       Patient ID: Raiza Mcpherson is a 35 y.o. female.    Chief Complaint: Sinus Problem    Sinus Problem  This is a new problem. The current episode started in the past 7 days. The problem has been waxing and waning since onset. There has been no fever. Associated symptoms include congestion, ear pain, sinus pressure and a sore throat. Pertinent negatives include no chills, coughing, diaphoresis, headaches, neck pain, shortness of breath or sneezing. The treatment provided mild relief.             Review of Systems   Constitutional: Negative for activity change, appetite change, chills, diaphoresis, fatigue, fever and unexpected weight change.   HENT: Positive for congestion, ear pain, postnasal drip, rhinorrhea, sinus pressure, sinus pain and sore throat. Negative for dental problem, drooling, ear discharge, facial swelling, hearing loss, mouth sores, nosebleeds, sneezing, tinnitus, trouble swallowing and voice change.    Eyes: Negative for photophobia, pain and visual disturbance.   Respiratory: Negative for cough, choking, chest tightness, shortness of breath and wheezing.    Cardiovascular: Negative for chest pain, palpitations and leg swelling.   Gastrointestinal: Negative for abdominal distention, abdominal pain, constipation, diarrhea, nausea and vomiting.   Genitourinary: Negative for decreased urine volume, difficulty urinating, dysuria, flank pain, frequency, hematuria and urgency.   Musculoskeletal: Negative for arthralgias, back pain, joint swelling, neck pain and neck stiffness.   Skin: Negative for rash.   Allergic/Immunologic: Negative for immunocompromised state.   Neurological: Negative for dizziness, tremors, seizures, syncope, facial asymmetry, speech difficulty, weakness, light-headedness, numbness and headaches.   Hematological: Negative for adenopathy. Does not bruise/bleed easily.   Psychiatric/Behavioral: Positive for sleep disturbance. Negative for confusion.        Objective:      Physical Exam  Constitutional:       General: She is not in acute distress.  HENT:      Head: Normocephalic and atraumatic.      Right Ear: Tympanic membrane is erythematous.      Left Ear: Tympanic membrane is erythematous.      Nose: Mucosal edema and rhinorrhea present.      Right Sinus: Maxillary sinus tenderness present.      Left Sinus: Maxillary sinus tenderness present.      Mouth/Throat:      Mouth: No oral lesions.      Pharynx: Posterior oropharyngeal erythema present. No oropharyngeal exudate or uvula swelling.   Eyes:      Conjunctiva/sclera: Conjunctivae normal.      Pupils: Pupils are equal, round, and reactive to light.   Neck:      Musculoskeletal: Normal range of motion and neck supple.   Cardiovascular:      Rate and Rhythm: Normal rate and regular rhythm.      Heart sounds: Normal heart sounds. No murmur. No friction rub. No gallop.    Pulmonary:      Effort: Pulmonary effort is normal. No respiratory distress.      Breath sounds: Normal breath sounds. No wheezing or rales.   Abdominal:      General: Bowel sounds are normal.      Palpations: Abdomen is soft.   Musculoskeletal: Normal range of motion.   Skin:     General: Skin is warm and dry.   Neurological:      Mental Status: She is alert and oriented to person, place, and time.         Assessment:     Vitals:    09/10/20 1040   BP: 121/80   Pulse: 93   Temp: 96.6 °F (35.9 °C)         1. Acute non-recurrent maxillary sinusitis        Plan:   Acute non-recurrent maxillary sinusitis  -     betamethasone acetate-betamethasone sodium phosphate injection 6 mg  -     amoxicillin-clavulanate 875-125mg (AUGMENTIN) 875-125 mg per tablet; Take 1 tablet by mouth every 12 (twelve) hours.  Dispense: 14 tablet; Refill: 0    as above  Meds and SE discussed  Return PRN

## 2020-10-01 ENCOUNTER — OFFICE VISIT (OUTPATIENT)
Dept: INTERNAL MEDICINE | Facility: CLINIC | Age: 35
End: 2020-10-01
Payer: COMMERCIAL

## 2020-10-01 DIAGNOSIS — T14.8XXA MUSCLE STRAIN: ICD-10-CM

## 2020-10-01 DIAGNOSIS — N30.00 ACUTE CYSTITIS WITHOUT HEMATURIA: ICD-10-CM

## 2020-10-01 DIAGNOSIS — M54.9 UPPER BACK PAIN: ICD-10-CM

## 2020-10-01 DIAGNOSIS — J01.01 ACUTE RECURRENT MAXILLARY SINUSITIS: Primary | ICD-10-CM

## 2020-10-01 DIAGNOSIS — R05.8 DRY COUGH: ICD-10-CM

## 2020-10-01 DIAGNOSIS — R07.89 CHEST PAIN, MUSCULAR: ICD-10-CM

## 2020-10-01 PROCEDURE — 99214 OFFICE O/P EST MOD 30 MIN: CPT | Mod: 95,,, | Performed by: NURSE PRACTITIONER

## 2020-10-01 PROCEDURE — 99214 PR OFFICE/OUTPT VISIT, EST, LEVL IV, 30-39 MIN: ICD-10-PCS | Mod: 95,,, | Performed by: NURSE PRACTITIONER

## 2020-10-01 RX ORDER — METHYLPREDNISOLONE 4 MG/1
TABLET ORAL
Qty: 1 PACKAGE | Refills: 0 | Status: SHIPPED | OUTPATIENT
Start: 2020-10-01 | End: 2020-10-22

## 2020-10-01 RX ORDER — LEVOFLOXACIN 500 MG/1
500 TABLET, FILM COATED ORAL DAILY
Qty: 7 TABLET | Refills: 0 | Status: SHIPPED | OUTPATIENT
Start: 2020-10-01 | End: 2020-10-08

## 2020-10-01 RX ORDER — METHOCARBAMOL 500 MG/1
500 TABLET, FILM COATED ORAL 2 TIMES DAILY PRN
Qty: 10 TABLET | Refills: 0 | Status: SHIPPED | OUTPATIENT
Start: 2020-10-01 | End: 2020-10-06

## 2020-10-01 NOTE — PROGRESS NOTES
"Subjective:    The patient location is: [place]  The chief complaint leading to consultation is: sinusitis, chest and back pain, uti    Visit type: audiovisual    Face to Face time with patient: 15  20 minutes of total time spent on the encounter, which includes face to face time and non-face to face time preparing to see the patient (eg, review of tests), Obtaining and/or reviewing separately obtained history, Documenting clinical information in the electronic or other health record, Independently interpreting results (not separately reported) and communicating results to the patient/family/caregiver, or Care coordination (not separately reported).         Each patient to whom he or she provides medical services by telemedicine is:  (1) informed of the relationship between the physician and patient and the respective role of any other health care provider with respect to management of the patient; and (2) notified that he or she may decline to receive medical services by telemedicine and may withdraw from such care at any time.    Notes:      Patient ID: Raiza Mcpherson is a 35 y.o. female.    Chief Complaint: No chief complaint on file.    HPI   This is a 34 y/o female patient who is new to me and presents for c/o recurrent sinusitis symptoms, chest and upper back discomfort and uti symptoms all that started yesterday.     1-Pt having head congestion, sinus pressure, PND, stuffy nose, dry cough, ears feel clogged all of the time. Was treated for sinusitis and same symptoms about 2-3 weeks ago. Pt was given augmentin and a steroid im.   Pt has been taking zyrtec and her inhaler     2-is having chest discomfort and upper back muscle pain from coughing since yesterday. Denies sob or cardiac chest pain.     3-pt c/o cloudy urine, no burning but itching with urination, "feels uncomfortable", urine is darker in color, no blood,  no odor noted.     Review of Systems   Constitutional: Negative for chills and fever. "   HENT: Positive for congestion, ear pain, postnasal drip, rhinorrhea, sinus pressure and sinus pain. Negative for ear discharge, hearing loss and sore throat.    Respiratory: Negative for cough, chest tightness and shortness of breath.    Cardiovascular: Negative for chest pain.   Gastrointestinal: Negative for abdominal pain, diarrhea and vomiting.   Genitourinary: Positive for frequency and urgency. Negative for difficulty urinating, dysuria, hematuria and pelvic pain.   Musculoskeletal: Positive for back pain and myalgias. Negative for neck pain.   Skin: Negative for rash.   Neurological: Positive for headaches.         Objective:   There were no vitals filed for this visit.       Physical Exam  Constitutional:       Appearance: She is well-developed.   HENT:      Head: Normocephalic.      Right Ear: External ear normal.      Left Ear: External ear normal.   Eyes:      Conjunctiva/sclera: Conjunctivae normal.   Neck:      Musculoskeletal: Normal range of motion.      Thyroid: No thyromegaly.      Vascular: No JVD.      Trachea: No tracheal deviation.   Cardiovascular:      Comments: No s/s of distress noted  Pulmonary:      Effort: Pulmonary effort is normal. No respiratory distress.   Musculoskeletal: Normal range of motion.   Skin:     General: Skin is dry.   Neurological:      Mental Status: She is alert and oriented to person, place, and time.   Psychiatric:         Behavior: Behavior normal.         Thought Content: Thought content normal.         Judgment: Judgment normal.         Assessment:       1. Acute recurrent maxillary sinusitis    2. Upper back pain    3. Muscle strain    4. Acute cystitis without hematuria    5. Dry cough    6. Chest pain, muscular        Plan:     levaquin, medrol dose pk, and robaxin.   Stay hydrated, use inhaler and zyrtec as prescribed.

## 2020-11-05 DIAGNOSIS — I10 ESSENTIAL HYPERTENSION: ICD-10-CM

## 2020-11-05 RX ORDER — BENAZEPRIL HYDROCHLORIDE AND HYDROCHLOROTHIAZIDE 10; 12.5 MG/1; MG/1
1 TABLET ORAL DAILY
Qty: 30 TABLET | Refills: 5 | Status: SHIPPED | OUTPATIENT
Start: 2020-11-05 | End: 2021-02-11 | Stop reason: SDUPTHER

## 2020-11-20 ENCOUNTER — HOSPITAL ENCOUNTER (EMERGENCY)
Facility: HOSPITAL | Age: 35
Discharge: HOME | End: 2020-11-21
Attending: EMERGENCY MEDICINE
Payer: COMMERCIAL

## 2020-11-20 ENCOUNTER — OFFICE VISIT (OUTPATIENT)
Dept: GASTROENTEROLOGY | Facility: CLINIC | Age: 35
End: 2020-11-20
Payer: COMMERCIAL

## 2020-11-20 DIAGNOSIS — R19.8 PEPTIC ULCER SYMPTOMS: Primary | ICD-10-CM

## 2020-11-20 DIAGNOSIS — K21.9 GASTROESOPHAGEAL REFLUX DISEASE, UNSPECIFIED WHETHER ESOPHAGITIS PRESENT: Primary | ICD-10-CM

## 2020-11-20 DIAGNOSIS — R10.13 EPIGASTRIC PAIN: ICD-10-CM

## 2020-11-20 PROCEDURE — 99284 EMERGENCY DEPT VISIT MOD MDM: CPT

## 2020-11-20 PROCEDURE — 85025 COMPLETE CBC W/AUTO DIFF WBC: CPT

## 2020-11-20 PROCEDURE — 99214 OFFICE O/P EST MOD 30 MIN: CPT | Mod: 95,,, | Performed by: INTERNAL MEDICINE

## 2020-11-20 PROCEDURE — 82150 ASSAY OF AMYLASE: CPT

## 2020-11-20 PROCEDURE — 99214 PR OFFICE/OUTPT VISIT, EST, LEVL IV, 30-39 MIN: ICD-10-PCS | Mod: 95,,, | Performed by: INTERNAL MEDICINE

## 2020-11-20 PROCEDURE — 80053 COMPREHEN METABOLIC PANEL: CPT

## 2020-11-20 PROCEDURE — 83690 ASSAY OF LIPASE: CPT

## 2020-11-20 RX ORDER — OMEPRAZOLE 40 MG/1
40 CAPSULE, DELAYED RELEASE ORAL DAILY
Qty: 30 CAPSULE | Refills: 11 | Status: SHIPPED | OUTPATIENT
Start: 2020-11-20 | End: 2020-11-23 | Stop reason: SDUPTHER

## 2020-11-20 NOTE — PROGRESS NOTES
Subjective:       Patient ID: Raiza Mcpherson is a 35 y.o. female.    Chief Complaint: Gastroesophageal Reflux    Telemedicine encounter today to reestablish care with aforementioned complaints.  Patient was last seen by nurse practitioner Ismael in [place] last December with chief complaint of abdominal pain nausea vomiting.  A gastric emptying study and stool antigen for H pylori were ordered however never performed.  She was subsequently lost to follow-up.    Today patient reports he abdominal pain.  Patient reports symptoms have been ongoing since last June, not long after the birth of her child.  She characterizes pain as a burning which occurs in her epigastrium, often waking her up in the middle night.  It radiates to the back and is associated with nausea vomiting.  She was evaluated by previous practitioner however at stopped all the sudden until approximately 1 week ago.  It is now can occur any time throughout the day after eating.  She took Pepcid yesterday with some relief.  She denies prior endoscopy.  Denies change in bowel habits or blood in stool.  Denies NSAID use.    Review of Systems   Constitutional: Negative for chills, fever and unexpected weight change.   HENT: Negative for congestion and trouble swallowing.    Respiratory: Negative for cough and shortness of breath.    Cardiovascular: Negative for chest pain and palpitations.   Gastrointestinal: Positive for abdominal pain. Negative for abdominal distention and blood in stool.         The following portions of the patient's history were reviewed and updated as appropriate: allergies, current medications, past family history, past medical history, past social history, past surgical history and problem list.    Objective:      Physical Exam  Constitutional:       Appearance: She is well-developed.   HENT:      Head: Normocephalic and atraumatic.   Eyes:      Conjunctiva/sclera: Conjunctivae normal.   Neck:      Musculoskeletal:  Normal range of motion.   Pulmonary:      Effort: Pulmonary effort is normal. No respiratory distress.   Neurological:      Mental Status: She is alert and oriented to person, place, and time.   Psychiatric:         Behavior: Behavior normal.         Thought Content: Thought content normal.         Judgment: Judgment normal.           Pertinent labs and imaging studies reviewed    Assessment:       1. Gastroesophageal reflux disease, unspecified whether esophagitis present        Plan:       Start on PPI  If no improvement 6-8 weeks will recommend EGD, to be performed locally or here in [place] per patient's preference    The patient location is: Home  The chief complaint leading to consultation is: GERD    Visit type: audiovisual    26 minutes of total time spent on the encounter, which includes face to face time and non-face to face time preparing to see the patient (eg, review of tests), Obtaining and/or reviewing separately obtained history, Documenting clinical information in the electronic or other health record, Independently interpreting results (not separately reported) and communicating results to the patient/family/caregiver, or Care coordination (not separately reported).         Each patient to whom he or she provides medical services by telemedicine is:  (1) informed of the relationship between the physician and patient and the respective role of any other health care provider with respect to management of the patient; and (2) notified that he or she may decline to receive medical services by telemedicine and may withdraw from such care at any time.    Notes:     (Portions of this note were dictated using voice recognition software and may contain dictation related errors in spelling/grammar/syntax not found on text review)

## 2020-11-21 VITALS
DIASTOLIC BLOOD PRESSURE: 68 MMHG | HEIGHT: 65 IN | OXYGEN SATURATION: 100 % | HEART RATE: 71 BPM | RESPIRATION RATE: 14 BRPM | BODY MASS INDEX: 35.5 KG/M2 | WEIGHT: 213.06 LBS | TEMPERATURE: 99 F | SYSTOLIC BLOOD PRESSURE: 124 MMHG

## 2020-11-21 LAB
ALBUMIN SERPL BCP-MCNC: 4 G/DL (ref 3.5–5.2)
ALP SERPL-CCNC: 76 U/L (ref 55–135)
ALT SERPL W/O P-5'-P-CCNC: 134 U/L (ref 10–44)
AMYLASE SERPL-CCNC: 62 U/L (ref 20–110)
ANION GAP SERPL CALC-SCNC: 12 MMOL/L (ref 8–16)
AST SERPL-CCNC: 153 U/L (ref 10–40)
BASOPHILS # BLD AUTO: 0.05 K/UL (ref 0–0.2)
BASOPHILS NFR BLD: 0.7 % (ref 0–1.9)
BILIRUB SERPL-MCNC: 1.7 MG/DL (ref 0.1–1)
BUN SERPL-MCNC: 11 MG/DL (ref 6–20)
CALCIUM SERPL-MCNC: 9 MG/DL (ref 8.7–10.5)
CHLORIDE SERPL-SCNC: 102 MMOL/L (ref 95–110)
CO2 SERPL-SCNC: 25 MMOL/L (ref 23–29)
CREAT SERPL-MCNC: 1.1 MG/DL (ref 0.5–1.4)
DIFFERENTIAL METHOD: ABNORMAL
EOSINOPHIL # BLD AUTO: 0.2 K/UL (ref 0–0.5)
EOSINOPHIL NFR BLD: 2.4 % (ref 0–8)
ERYTHROCYTE [DISTWIDTH] IN BLOOD BY AUTOMATED COUNT: 13.9 % (ref 11.5–14.5)
EST. GFR  (AFRICAN AMERICAN): >60 ML/MIN/1.73 M^2
EST. GFR  (NON AFRICAN AMERICAN): >60 ML/MIN/1.73 M^2
GLUCOSE SERPL-MCNC: 86 MG/DL (ref 70–110)
HCT VFR BLD AUTO: 39.2 % (ref 37–48.5)
HGB BLD-MCNC: 12.1 G/DL (ref 12–16)
IMM GRANULOCYTES # BLD AUTO: 0.02 K/UL (ref 0–0.04)
IMM GRANULOCYTES NFR BLD AUTO: 0.3 % (ref 0–0.5)
LIPASE SERPL-CCNC: 16 U/L (ref 4–60)
LYMPHOCYTES # BLD AUTO: 1.9 K/UL (ref 1–4.8)
LYMPHOCYTES NFR BLD: 26.6 % (ref 18–48)
MCH RBC QN AUTO: 26.4 PG (ref 27–31)
MCHC RBC AUTO-ENTMCNC: 30.9 G/DL (ref 32–36)
MCV RBC AUTO: 86 FL (ref 82–98)
MONOCYTES # BLD AUTO: 0.4 K/UL (ref 0.3–1)
MONOCYTES NFR BLD: 5.7 % (ref 4–15)
NEUTROPHILS # BLD AUTO: 4.6 K/UL (ref 1.8–7.7)
NEUTROPHILS NFR BLD: 64.3 % (ref 38–73)
NRBC BLD-RTO: 0 /100 WBC
PLATELET # BLD AUTO: 276 K/UL (ref 150–350)
PMV BLD AUTO: 12.1 FL (ref 9.2–12.9)
POTASSIUM SERPL-SCNC: 4.4 MMOL/L (ref 3.5–5.1)
PROT SERPL-MCNC: 8.1 G/DL (ref 6–8.4)
RBC # BLD AUTO: 4.58 M/UL (ref 4–5.4)
SODIUM SERPL-SCNC: 139 MMOL/L (ref 136–145)
WBC # BLD AUTO: 7.19 K/UL (ref 3.9–12.7)

## 2020-11-21 PROCEDURE — 25000003 PHARM REV CODE 250: Performed by: EMERGENCY MEDICINE

## 2020-11-21 RX ORDER — OMEPRAZOLE 20 MG/1
20 CAPSULE, DELAYED RELEASE ORAL DAILY
Qty: 14 CAPSULE | Refills: 0 | OUTPATIENT
Start: 2020-11-21 | End: 2020-11-23

## 2020-11-21 RX ORDER — SUCRALFATE 1 G/1
1 TABLET ORAL 4 TIMES DAILY
Qty: 120 TABLET | Refills: 0 | Status: SHIPPED | OUTPATIENT
Start: 2020-11-21 | End: 2020-12-21

## 2020-11-21 RX ORDER — FAMOTIDINE 20 MG/1
20 TABLET, FILM COATED ORAL
Status: COMPLETED | OUTPATIENT
Start: 2020-11-21 | End: 2020-11-21

## 2020-11-21 RX ORDER — TRAMADOL HYDROCHLORIDE 50 MG/1
50 TABLET ORAL EVERY 6 HOURS PRN
Qty: 10 TABLET | Refills: 0 | Status: SHIPPED | OUTPATIENT
Start: 2020-11-21 | End: 2021-03-31

## 2020-11-21 RX ADMIN — FAMOTIDINE 20 MG: 20 TABLET ORAL at 12:11

## 2020-11-21 RX ADMIN — MAGNESIUM HYDROXIDE/ALUMINUM HYDROXICE/SIMETHICONE 50 ML: 120; 1200; 1200 SUSPENSION ORAL at 12:11

## 2020-11-21 NOTE — ED NOTES
Pt is unable to give sample at this time, but will use call-light when able to give sample. Call-light within reach. Primary nurse notified.

## 2020-11-21 NOTE — ED PROVIDER NOTES
SCRIBE #1 NOTE: I, Kalyn Loyola, am scribing for, and in the presence of, Juan Diego Alcantara MD. I have scribed the entire note.       History     Chief Complaint   Patient presents with    Abdominal Pain     mid gastric abd pain worsening x3 days; pain worse with eating; vomiting after eating makes it feel better     Review of patient's allergies indicates:  No Known Allergies      History of Present Illness     HPI    11/20/2020, 11:59 PM  History obtained from the patient      History of Present Illness: Raiza Mcpherson is a 35 y.o. female patient with a PMHx of asthma and HTN who presents to the Emergency Department for evaluation of mild gastric abdominal pain which onset 3 days PTA. Pt reports recent pregnancy last year and a hernia repair right after birth. Symptoms are constant and moderate in severity. Pt states pain feels worse with eating and after lifting R leg. Vomiting after eating makes it feel better. Associated sxs include back pain. Patient denies any fever, cough, chills, CP, SOB, n/v/d, and all other sxs at this time. No prior tx reported. No further complaints or concerns at this time.       Arrival mode: Personal vehicle     PCP: Primary Doctor No        Past Medical History:  Past Medical History:   Diagnosis Date    Asthma     childhood    Hypertension        Past Surgical History:  Past Surgical History:   Procedure Laterality Date    EXCISION OF MASS OF ABDOMEN N/A 11/27/2019    Procedure: EXCISION, MASS, ABDOMEN;  Surgeon: Angie Pagan MD;  Location: [facility] OR;  Service: General;  Laterality: N/A;  XI ROBOTIC      ROBOT-ASSISTED REPAIR OF VENTRAL HERNIA USING DA PHIL XI N/A 11/27/2019    Procedure: XI ROBOTIC REPAIR, HERNIA, VENTRAL;  Surgeon: Angie Pagan MD;  Location: [facility] OR;  Service: General;  Laterality: N/A;         Family History:  Family History   Problem Relation Age of Onset    Hypertension Father     Kidney disease Paternal Grandmother      Cancer Paternal Grandmother     Kidney disease Paternal Grandfather     Vision loss Paternal Grandfather     Hypertension Mother        Social History:  Social History     Tobacco Use    Smoking status: Never Smoker    Smokeless tobacco: Never Used   Substance and Sexual Activity    Alcohol use: No     Frequency: Never     Binge frequency: Never    Drug use: No    Sexual activity: Not Currently        Review of Systems     Review of Systems   Constitutional: Negative for chills and fever.   HENT: Negative for sore throat.    Respiratory: Negative for cough and shortness of breath.    Cardiovascular: Negative for chest pain.   Gastrointestinal: Positive for abdominal pain (mild gastric ) and vomiting. Negative for diarrhea and nausea.   Genitourinary: Negative for dysuria.   Musculoskeletal: Positive for back pain.   Skin: Negative for rash.   Neurological: Negative for weakness.   Hematological: Does not bruise/bleed easily.   All other systems reviewed and are negative.       Physical Exam     Initial Vitals [11/20/20 2317]   BP Pulse Resp Temp SpO2   (!) 143/69 77 18 98.6 °F (37 °C) 100 %      MAP       --          Physical Exam  Nursing Notes and Vital Signs Reviewed.  Constitutional: Patient is in no acute distress. Well-developed and well-nourished.  Head: Atraumatic. Normocephalic.  Eyes: PERRL. EOM intact. Conjunctivae are not pale. No scleral icterus.  ENT: Mucous membranes are moist. Oropharynx is clear and symmetric.    Neck: Supple. Full ROM. No lymphadenopathy.  Cardiovascular: Regular rate. Regular rhythm. No murmurs, rubs, or gallops. Distal pulses are 2+ and symmetric.  Pulmonary/Chest: No respiratory distress. Clear to auscultation bilaterally. No wheezing or rales.  Abdominal: Soft and non-distended.  Epigastric and LUQ tenderness. Spontaneous reducible ventral abdominal hernia. No rebound, guarding, or rigidity. Good bowel sounds.  Genitourinary: No CVA tenderness  Musculoskeletal: Moves  "all extremities. No obvious deformities. No edema. No calf tenderness.  Skin: Warm and dry.  Neurological:  Alert, awake, and appropriate.  Normal speech.  No acute focal neurological deficits are appreciated.  Psychiatric: Normal affect. Good eye contact. Appropriate in content.     ED Course   Procedures  ED Vital Signs:  Vitals:    11/20/20 2317 11/21/20 0134   BP: (!) 143/69 124/68   Pulse: 77 71   Resp: 18 14   Temp: 98.6 °F (37 °C)    TempSrc: Oral    SpO2: 100% 100%   Weight: 96.6 kg (213 lb 1.2 oz)    Height: 5' 5" (1.651 m)        Abnormal Lab Results:  Labs Reviewed   CBC W/ AUTO DIFFERENTIAL - Abnormal; Notable for the following components:       Result Value    MCH 26.4 (*)     MCHC 30.9 (*)     All other components within normal limits   COMPREHENSIVE METABOLIC PANEL - Abnormal; Notable for the following components:    Total Bilirubin 1.7 (*)      (*)      (*)     All other components within normal limits   LIPASE   AMYLASE        All Lab Results:  Results for orders placed or performed during the hospital encounter of 11/20/20   CBC W/ AUTO DIFFERENTIAL   Result Value Ref Range    WBC 7.19 3.90 - 12.70 K/uL    RBC 4.58 4.00 - 5.40 M/uL    Hemoglobin 12.1 12.0 - 16.0 g/dL    Hematocrit 39.2 37.0 - 48.5 %    MCV 86 82 - 98 fL    MCH 26.4 (L) 27.0 - 31.0 pg    MCHC 30.9 (L) 32.0 - 36.0 g/dL    RDW 13.9 11.5 - 14.5 %    Platelets 276 150 - 350 K/uL    MPV 12.1 9.2 - 12.9 fL    Immature Granulocytes 0.3 0.0 - 0.5 %    Gran # (ANC) 4.6 1.8 - 7.7 K/uL    Immature Grans (Abs) 0.02 0.00 - 0.04 K/uL    Lymph # 1.9 1.0 - 4.8 K/uL    Mono # 0.4 0.3 - 1.0 K/uL    Eos # 0.2 0.0 - 0.5 K/uL    Baso # 0.05 0.00 - 0.20 K/uL    nRBC 0 0 /100 WBC    Gran % 64.3 38.0 - 73.0 %    Lymph % 26.6 18.0 - 48.0 %    Mono % 5.7 4.0 - 15.0 %    Eosinophil % 2.4 0.0 - 8.0 %    Basophil % 0.7 0.0 - 1.9 %    Differential Method Automated    Comp. Metabolic Panel   Result Value Ref Range    Sodium 139 136 - 145 mmol/L    " Potassium 4.4 3.5 - 5.1 mmol/L    Chloride 102 95 - 110 mmol/L    CO2 25 23 - 29 mmol/L    Glucose 86 70 - 110 mg/dL    BUN 11 6 - 20 mg/dL    Creatinine 1.1 0.5 - 1.4 mg/dL    Calcium 9.0 8.7 - 10.5 mg/dL    Total Protein 8.1 6.0 - 8.4 g/dL    Albumin 4.0 3.5 - 5.2 g/dL    Total Bilirubin 1.7 (H) 0.1 - 1.0 mg/dL    Alkaline Phosphatase 76 55 - 135 U/L     (H) 10 - 40 U/L     (H) 10 - 44 U/L    Anion Gap 12 8 - 16 mmol/L    eGFR if African American >60 >60 mL/min/1.73 m^2    eGFR if non African American >60 >60 mL/min/1.73 m^2   Lipase   Result Value Ref Range    Lipase 16 4 - 60 U/L   Amylase   Result Value Ref Range    Amylase 62 20 - 110 U/L         Imaging Results:  Imaging Results    None                 The Emergency Provider reviewed the vital signs and test results, which are outlined above.     ED Discussion       12:46 AM: Reassessed pt at this time. Advised pt to f/u with GI doctor as outpatient. Discussed with pt all pertinent ED information and results. Discussed pt dx and plan of tx. Gave pt all f/u and return to the ED instructions. All questions and concerns were addressed at this time. Pt expresses understanding of information and instructions, and is comfortable with plan to discharge. Pt is stable for discharge.    I discussed with patient and/or family/caretaker that evaluation in the ED does not suggest any emergent or life threatening medical conditions requiring immediate intervention beyond what was provided in the ED, and I believe patient is safe for discharge.  Regardless, an unremarkable evaluation in the ED does not preclude the development or presence of a serious of life threatening condition. As such, patient was instructed to return immediately for any worsening or change in current symptoms.         Medical Decision Making:   Clinical Tests:   Lab Tests: Ordered and Reviewed           ED Medication(s):  Medications   GI cocktail (mylanta 30 mL, lidocaine 2 % viscous 10  mL, dicyclomine 10 mL) 50 mL (50 mLs Oral Given 11/21/20 0037)   famotidine tablet 20 mg (20 mg Oral Given 11/21/20 0037)       Discharge Medication List as of 11/21/2020  1:18 AM      START taking these medications    Details   !! omeprazole (PRILOSEC) 20 MG capsule Take 1 capsule (20 mg total) by mouth once daily. for 14 days, Starting Sat 11/21/2020, Until Sat 12/5/2020, Print      sucralfate (CARAFATE) 1 gram tablet Take 1 tablet (1 g total) by mouth 4 (four) times daily., Starting Sat 11/21/2020, Until Mon 12/21/2020, Print      traMADoL (ULTRAM) 50 mg tablet Take 1 tablet (50 mg total) by mouth every 6 (six) hours as needed., Starting Sat 11/21/2020, Print       !! - Potential duplicate medications found. Please discuss with provider.          Follow-up Information     Orin Shelton MD.    Specialties: Gastroenterology, Hepatology  Why: If symptoms worsen  Contact information:  [address]  [place] [address] [phone]             with your pcp In 2 weeks.           [facility] - .    Specialty: Emergency Medicine  Why: As needed, If symptoms worsen  Contact information:  [address]  [facility] [address] [phone]                     Scribe Attestation:   Scribe #1: I performed the above scribed service and the documentation accurately describes the services I performed. I attest to the accuracy of the note.     Attending:   Physician Attestation Statement for Scribe #1: I, Juan Diego Alcantara MD, personally performed the services described in this documentation, as scribed by Kalyn Loyola, in my presence, and it is both accurate and complete.           Clinical Impression       ICD-10-CM ICD-9-CM   1. Peptic ulcer symptoms  R19.8 787.99   2. Epigastric pain  R10.13 789.06       Disposition:   Disposition: Discharged  Condition: Stable         Juan Diego Alcantara MD  11/21/20 8081

## 2020-11-23 ENCOUNTER — TELEPHONE (OUTPATIENT)
Dept: GASTROENTEROLOGY | Facility: CLINIC | Age: 35
End: 2020-11-23

## 2020-11-23 ENCOUNTER — HOSPITAL ENCOUNTER (EMERGENCY)
Facility: HOSPITAL | Age: 35
Discharge: HOME | End: 2020-11-23
Attending: EMERGENCY MEDICINE
Payer: COMMERCIAL

## 2020-11-23 VITALS
SYSTOLIC BLOOD PRESSURE: 140 MMHG | HEART RATE: 67 BPM | WEIGHT: 209.19 LBS | DIASTOLIC BLOOD PRESSURE: 96 MMHG | BODY MASS INDEX: 34.82 KG/M2 | OXYGEN SATURATION: 100 % | RESPIRATION RATE: 18 BRPM | TEMPERATURE: 98 F

## 2020-11-23 DIAGNOSIS — R10.13 ACUTE EPIGASTRIC PAIN: ICD-10-CM

## 2020-11-23 PROCEDURE — 96372 THER/PROPH/DIAG INJ SC/IM: CPT

## 2020-11-23 PROCEDURE — 99284 EMERGENCY DEPT VISIT MOD MDM: CPT | Mod: 25

## 2020-11-23 PROCEDURE — 25000003 PHARM REV CODE 250: Performed by: PHYSICIAN ASSISTANT

## 2020-11-23 PROCEDURE — 63600175 PHARM REV CODE 636 W HCPCS: Performed by: PHYSICIAN ASSISTANT

## 2020-11-23 RX ORDER — ONDANSETRON 4 MG/1
4 TABLET, ORALLY DISINTEGRATING ORAL EVERY 6 HOURS PRN
Qty: 20 TABLET | Refills: 0 | Status: SHIPPED | OUTPATIENT
Start: 2020-11-23 | End: 2021-03-31

## 2020-11-23 RX ORDER — FAMOTIDINE 20 MG/1
40 TABLET, FILM COATED ORAL
Status: COMPLETED | OUTPATIENT
Start: 2020-11-23 | End: 2020-11-23

## 2020-11-23 RX ORDER — ONDANSETRON 2 MG/ML
4 INJECTION INTRAMUSCULAR; INTRAVENOUS
Status: COMPLETED | OUTPATIENT
Start: 2020-11-23 | End: 2020-11-23

## 2020-11-23 RX ORDER — OMEPRAZOLE 40 MG/1
40 CAPSULE, DELAYED RELEASE ORAL DAILY
Qty: 30 CAPSULE | Refills: 0 | Status: SHIPPED | OUTPATIENT
Start: 2020-11-23 | End: 2021-03-31

## 2020-11-23 RX ORDER — MORPHINE SULFATE 4 MG/ML
4 INJECTION, SOLUTION INTRAMUSCULAR; INTRAVENOUS
Status: COMPLETED | OUTPATIENT
Start: 2020-11-23 | End: 2020-11-23

## 2020-11-23 RX ADMIN — MAGNESIUM HYDROXIDE/ALUMINUM HYDROXICE/SIMETHICONE 50 ML: 120; 1200; 1200 SUSPENSION ORAL at 10:11

## 2020-11-23 RX ADMIN — MORPHINE SULFATE 4 MG: 4 INJECTION, SOLUTION INTRAMUSCULAR; INTRAVENOUS at 09:11

## 2020-11-23 RX ADMIN — ONDANSETRON 4 MG: 2 INJECTION INTRAMUSCULAR; INTRAVENOUS at 09:11

## 2020-11-23 RX ADMIN — FAMOTIDINE 40 MG: 20 TABLET ORAL at 09:11

## 2020-11-23 NOTE — TELEPHONE ENCOUNTER
----- Message from Amanda Zambrano sent at 11/23/2020  2:59 PM CST -----  Contact: Pt  Pt would like to be seen today, by Kimberley Guevara if possible.  Please call her back at .[phone] (home)/  virtually appt please.    Thanks

## 2020-11-23 NOTE — TELEPHONE ENCOUNTER
Patient calling to schedule hospital follow up appointment. Patient offered next available appointment 12/30/2020 at 10:20am. Patient declined stating that she needed to be seen earlier. Patient stated that she would call the appointment desk to schedule.

## 2020-11-23 NOTE — ED PROVIDER NOTES
History      Chief Complaint   Patient presents with    Abdominal Pain     c/o increasing pain to epigastric region since being seen and treated here Friday for ulcers.       Review of patient's allergies indicates:  No Known Allergies     HPI   HPI    11/23/2020, 9:02 AM   History obtained from the patient      History of Present Illness: Raiza Mcpherson is a 35 y.o. female patient who presents to the Emergency Department for epigastric pain, unable to keep omeprazone down. Symptoms are moderate in severity.     No further complaints or concerns at this time.           PCP: Primary Doctor No       Past Medical History:  Past Medical History:   Diagnosis Date    Asthma     childhood    Hypertension          Past Surgical History:  Past Surgical History:   Procedure Laterality Date    EXCISION OF MASS OF ABDOMEN N/A 11/27/2019    Procedure: EXCISION, MASS, ABDOMEN;  Surgeon: Angei Pagan MD;  Location: [facility] OR;  Service: General;  Laterality: N/A;  XI ROBOTIC      ROBOT-ASSISTED REPAIR OF VENTRAL HERNIA USING DA PHIL XI N/A 11/27/2019    Procedure: XI ROBOTIC REPAIR, HERNIA, VENTRAL;  Surgeon: Angie Pagan MD;  Location: [facility] OR;  Service: General;  Laterality: N/A;           Family History:  Family History   Problem Relation Age of Onset    Hypertension Father     Kidney disease Paternal Grandmother     Cancer Paternal Grandmother     Kidney disease Paternal Grandfather     Vision loss Paternal Grandfather     Hypertension Mother            Social History:  Social History     Tobacco Use    Smoking status: Never Smoker    Smokeless tobacco: Never Used   Substance and Sexual Activity    Alcohol use: No     Frequency: Never     Binge frequency: Never    Drug use: No    Sexual activity: Not Currently       ROS     Review of Systems   Constitutional: Negative for chills and fever.   HENT: Negative for facial swelling and trouble swallowing.    Eyes: Negative for discharge,  redness and visual disturbance.   Respiratory: Negative for chest tightness and shortness of breath.    Cardiovascular: Negative for chest pain and leg swelling.   Gastrointestinal: Positive for abdominal pain and nausea. Negative for blood in stool and diarrhea.   Genitourinary: Negative for decreased urine volume and dysuria.   Musculoskeletal: Negative for joint swelling and neck stiffness.   Skin: Negative for rash and wound.   Neurological: Negative for syncope and facial asymmetry.   All other systems reviewed and are negative.      Physical Exam      Initial Vitals [11/23/20 0851]   BP Pulse Resp Temp SpO2   138/88 77 17 98.4 °F (36.9 °C) 96 %      MAP       --         Physical Exam  Vital signs and nursing notes reviewed.  Constitutional: Patient is in NAD. Awake and alert. Well-developed and well-nourished.  Head: Atraumatic. Normocephalic.  Eyes: PERRL. EOM intact. Conjunctivae nl. No scleral icterus.  ENT: Mucous membranes are moist. Oropharynx is clear.  Neck: Supple. No JVD. No lymphadenopathy.  No meningismus  Cardiovascular: Regular rate and rhythm. No murmurs, rubs, or gallops. Distal pulses are 2+ and symmetric.  Pulmonary/Chest: No respiratory distress. Clear to auscultation bilaterally. No wheezing, rales, or rhonchi.  Abdominal: Soft. Non-distended. Epigastric TTP. No rebound, guarding, or rigidity. Good bowel sounds.  Genitourinary: No CVA tenderness  Musculoskeletal: Moves all extremities. No edema.   Skin: Warm and dry.  Neurological: Awake and alert. No acute focal neurological deficits are appreciated.  Psychiatric: Normal affect. Good eye contact. Appropriate in content.      ED Course          Procedures  ED Vital Signs:  Vitals:    11/23/20 0851 11/23/20 0901 11/23/20 0952   BP: 138/88 (!) 150/98    Pulse: 77 74    Resp: 17 18 18   Temp: 98.4 °F (36.9 °C)     TempSrc: Oral     SpO2: 96% 100%    Weight: 94.9 kg (209 lb 3.5 oz)                   Imaging Results:  Imaging Results           X-Ray Abdomen Flat And Erect (Final result)  Result time 11/23/20 09:24:32    Final result by Willie Bermudez MD (11/23/20 09:24:32)                 Impression:      No acute radiographic abnormality in the abdomen.    Left upper quadrant calcifications are presumed splenic as identified on prior CT.      Electronically signed by: Willie Bermudez  Date:    11/23/2020  Time:    09:24             Narrative:    EXAMINATION:  XR ABDOMEN FLAT AND ERECT    CLINICAL HISTORY:  Epigastric pain    TECHNIQUE:  Flat and erect AP views of the abdomen were performed.    COMPARISON:  Abdominal radiograph 12/17/2019; CT abdomen pelvis 09/04/2019    FINDINGS:  Nonobstructing bowel gas pattern.  No pathologic mass identified in the abdomen.  There are calcifications in the left upper quadrant, likely within the spleen as on prior CT.  Presumed phleboliths identified within the pelvis.  Osseous structures appear intact.  Minor levo scoliotic curvature of the lumbar spine.                                   The Emergency Provider reviewed the vital signs and test results, which are outlined above.    ED Discussion             Medication(s) given in the ER:  Medications   morphine injection 4 mg (4 mg Intramuscular Given 11/23/20 0952)   ondansetron injection 4 mg (4 mg Intramuscular Given 11/23/20 0953)   GI cocktail (mylanta 30 mL, lidocaine 2 % viscous 10 mL, dicyclomine 10 mL) 50 mL (50 mLs Oral Given 11/23/20 1018)   famotidine tablet 40 mg (40 mg Oral Given 11/23/20 0959)           Follow-up Information     Schedule an appointment as soon as possible for a visit  with [facility].    Specialty: Gastroenterology  Contact information:  [address] [facility] [address] [address] [phone]  Additional information:  4th Floor - From [address], take the [facility] exit [address] Enter the facility from the [address]                        Medication List      START taking these medications     ondansetron 4 MG Tbdl  Commonly known as: ZOFRAN-ODT  Take 1 tablet (4 mg total) by mouth every 6 (six) hours as needed (nausea/vomiting).        CHANGE how you take these medications    omeprazole 40 MG capsule  Commonly known as: PRILOSEC  Take 1 capsule (40 mg total) by mouth once daily.  What changed: Another medication with the same name was removed. Continue taking this medication, and follow the directions you see here.        ASK your doctor about these medications    albuterol 90 mcg/actuation inhaler  Commonly known as: PROVENTIL/VENTOLIN HFA  Inhale 2 puffs into the lungs every 6 (six) hours as needed for Wheezing.     amoxicillin-clavulanate 875-125mg 875-125 mg per tablet  Commonly known as: AUGMENTIN  Take 1 tablet by mouth every 12 (twelve) hours.     benazepril-hydrochlorthiazide 10-12.5 mg Tab  Commonly known as: LOTENSIN HCT  Take 1 tablet by mouth once daily.     CAPRON DMT 30-30 mg Tab  Generic drug: pyrilamine-dextromethorphan  Take 1 tablet by mouth 3 (three) times daily as needed (congestion).     labetaloL 300 MG tablet  Commonly known as: NORMODYNE  Take 1 tablet (300 mg total) by mouth 3 (three) times daily.     norgestrel-ethinyl estradioL 0.3-30 mg-mcg per tablet  Commonly known as: LO/OVRAL  Take 1 tablet by mouth once daily.     sucralfate 1 gram tablet  Commonly known as: CARAFATE  Take 1 tablet (1 g total) by mouth 4 (four) times daily.     traMADoL 50 mg tablet  Commonly known as: ULTRAM  Take 1 tablet (50 mg total) by mouth every 6 (six) hours as needed.           Where to Get Your Medications      You can get these medications from any pharmacy    Bring a paper prescription for each of these medications  · omeprazole 40 MG capsule  · ondansetron 4 MG Tbdl             Medical Decision Making      10:26 AM Pt says improvement with gi cocktail.   Increased omeprazole dose to 40 qd.  Rx zofran.  FU GI.    All findings were reviewed with the patient/family in detail.   All remaining  questions and concerns were addressed at that time.  Patient/family has been counseled regarding the need for follow-up as well as the indication to return to the emergency room should new or worrisome developments occur.        MDM               Clinical Impression:        ICD-10-CM ICD-9-CM   1. Acute epigastric pain  R10.13 789.06     338.19               Caitlyn Molina PA-C  11/23/20 1056

## 2020-11-23 NOTE — Clinical Note
"Rg Harris accompanied Raiza "Prem Mcpherson to the emergency department on 11/23/2020. They may return to work on 11/24/2020.      If you have any questions or concerns, please don't hesitate to call.      Caitlyn Molina PA-C"

## 2020-11-23 NOTE — TELEPHONE ENCOUNTER
----- Message from Leanna Adorno sent at 11/23/2020  4:07 PM CST -----  Type:  Same Day Appointment Request    Caller is requesting a same day appointment.  Caller declined first available appointment listed below.    Name of Caller: Chad Eastman   When is the first available appointment?    Symptoms: [facility] [place] followup//stomach and back pain   Best Call Back Number:   [phone]  Additional Information:  Pt would like a virtual appt tomorrow//11/24/20//please call asap//cristine/tito

## 2020-11-23 NOTE — TELEPHONE ENCOUNTER
Patient requesting an appt for a virtual visit tomorrow.  She stated that she had been to the ER and has dx of ulcers and the medication is not working.  She wants to talk about scheduling a scope as directed by the ER doctor.  Scheduled a virtual appt with Natalia Guevara tomorrow at 11:00.

## 2020-11-24 ENCOUNTER — OFFICE VISIT (OUTPATIENT)
Dept: GASTROENTEROLOGY | Facility: CLINIC | Age: 35
End: 2020-11-24
Payer: COMMERCIAL

## 2020-11-24 ENCOUNTER — ANESTHESIA EVENT (OUTPATIENT)
Dept: SURGERY | Facility: HOSPITAL | Age: 35
End: 2020-11-24
Payer: COMMERCIAL

## 2020-11-24 ENCOUNTER — OFFICE VISIT (OUTPATIENT)
Dept: SURGERY | Facility: CLINIC | Age: 35
End: 2020-11-24
Payer: COMMERCIAL

## 2020-11-24 VITALS
SYSTOLIC BLOOD PRESSURE: 109 MMHG | HEART RATE: 90 BPM | TEMPERATURE: 99 F | BODY MASS INDEX: 36.03 KG/M2 | WEIGHT: 216.5 LBS | DIASTOLIC BLOOD PRESSURE: 74 MMHG

## 2020-11-24 DIAGNOSIS — K80.20 CALCULUS OF GALLBLADDER WITHOUT CHOLECYSTITIS WITHOUT OBSTRUCTION: ICD-10-CM

## 2020-11-24 DIAGNOSIS — R79.89 ELEVATED LFTS: ICD-10-CM

## 2020-11-24 DIAGNOSIS — K81.0 ACUTE CHOLECYSTITIS: Primary | ICD-10-CM

## 2020-11-24 DIAGNOSIS — Z01.818 PREOP TESTING: ICD-10-CM

## 2020-11-24 DIAGNOSIS — Z01.818 PREOP TESTING: Primary | ICD-10-CM

## 2020-11-24 DIAGNOSIS — R10.10 UPPER ABDOMINAL PAIN: Primary | ICD-10-CM

## 2020-11-24 PROCEDURE — 99213 OFFICE O/P EST LOW 20 MIN: CPT | Mod: 95,,, | Performed by: PHYSICIAN ASSISTANT

## 2020-11-24 PROCEDURE — 93005 ELECTROCARDIOGRAM TRACING: CPT

## 2020-11-24 PROCEDURE — 99999 PR PBB SHADOW E&M-EST. PATIENT-LVL IV: ICD-10-PCS | Mod: PBBFAC,,, | Performed by: SURGERY

## 2020-11-24 PROCEDURE — 99214 PR OFFICE/OUTPT VISIT, EST, LEVL IV, 30-39 MIN: ICD-10-PCS | Mod: 57,S$GLB,, | Performed by: SURGERY

## 2020-11-24 PROCEDURE — 99214 OFFICE O/P EST MOD 30 MIN: CPT | Mod: 57,S$GLB,, | Performed by: SURGERY

## 2020-11-24 PROCEDURE — 99999 PR PBB SHADOW E&M-EST. PATIENT-LVL IV: CPT | Mod: PBBFAC,,, | Performed by: SURGERY

## 2020-11-24 PROCEDURE — 99213 PR OFFICE/OUTPT VISIT, EST, LEVL III, 20-29 MIN: ICD-10-PCS | Mod: 95,,, | Performed by: PHYSICIAN ASSISTANT

## 2020-11-24 RX ORDER — SODIUM CHLORIDE 9 MG/ML
INJECTION, SOLUTION INTRAVENOUS CONTINUOUS
Status: CANCELLED | OUTPATIENT
Start: 2020-11-24

## 2020-11-24 NOTE — PROGRESS NOTES
History & Physical  General Surgery      SUBJECTIVE:     Chief Complaint/Reason for Admission: Consult (Gall bladder)      History of Present Illness:  Patient is a 35 y.o. female presents with Consult (Gall bladder)    35 year old female known to me for prior robotic ventral hernia repair on 11/27/2019 referred for consultation by ZOHREH Gastelum for evaluation of symptomatic cholelithiasis.  The patient has had 2 recent ER visits with epigastric postprandial abdominal pain with evidence of transaminitis on laboratory evaluation.  She has a known history of cholelithiasis but prior evaluation with a HIDA showed hyperkinetic gallbladder without evidence of obstruction.  She endorses associated back pain but amylase and lipase were within normal limits.    No current facility-administered medications for this visit.   No current outpatient medications on file.    Facility-Administered Medications Ordered in Other Visits:     0.9%  NaCl infusion, , Intravenous, Continuous, Angie Pagan MD    cefazolin (ANCEF) 2 gram in dextrose 5% 50 mL IVPB (premix), 2 g, Intravenous, On Call Procedure, Angie Pagan MD    indocyanine green injection 2.5 mg, 2.5 mg, Intravenous, Once, Angie Pagan MD    nozaseptin (NOZIN) nasal , , Each Nostril, BID, Angie Pagan MD    Review of patient's allergies indicates:  No Known Allergies    Past Medical History:   Diagnosis Date    Asthma     childhood    Hypertension      Past Surgical History:   Procedure Laterality Date    EXCISION OF MASS OF ABDOMEN N/A 11/27/2019    Procedure: EXCISION, MASS, ABDOMEN;  Surgeon: Angie Pagan MD;  Location: [facility] OR;  Service: General;  Laterality: N/A;  XI ROBOTIC      ROBOT-ASSISTED REPAIR OF VENTRAL HERNIA USING DA PHIL XI N/A 11/27/2019    Procedure: XI ROBOTIC REPAIR, HERNIA, VENTRAL;  Surgeon: Angie Pagan MD;  Location: [facility] OR;  Service: General;  Laterality: N/A;     Family History    Problem Relation Age of Onset    Hypertension Father     Kidney disease Paternal Grandmother     Cancer Paternal Grandmother     Kidney disease Paternal Grandfather     Vision loss Paternal Grandfather     Hypertension Mother      Social History     Tobacco Use    Smoking status: Never Smoker    Smokeless tobacco: Never Used   Substance Use Topics    Alcohol use: No     Frequency: Never     Binge frequency: Never    Drug use: No        Review of Systems:  Review of Systems   Constitutional: Negative for unexpected weight change.   HENT: Negative for congestion.    Eyes: Negative for visual disturbance.   Respiratory: Negative for shortness of breath.    Cardiovascular: Negative for chest pain.   Gastrointestinal: Positive for abdominal pain and nausea. Negative for diarrhea.   Genitourinary: Negative for difficulty urinating.   Musculoskeletal: Positive for back pain.   Skin: Negative for rash.   Allergic/Immunologic: Negative for immunocompromised state.   Neurological: Negative for weakness.   Psychiatric/Behavioral: The patient is not nervous/anxious.        OBJECTIVE:     Vital Signs (Most Recent)  /74   Pulse 90   Temp 98.6 °F (37 °C) (Oral)   Wt 98.2 kg (216 lb 7.9 oz)   BMI 36.03 kg/m²     Physical Exam:   Physical Exam  Vitals signs reviewed.   Constitutional:       General: She is not in acute distress.     Appearance: She is well-developed. She is obese.   HENT:      Head: Normocephalic and atraumatic.   Neck:      Musculoskeletal: Neck supple.   Cardiovascular:      Rate and Rhythm: Normal rate and regular rhythm.   Pulmonary:      Effort: Pulmonary effort is normal.   Abdominal:      General: There is no distension.      Palpations: Abdomen is soft.      Tenderness: There is abdominal tenderness (epigastric and RUQ, + multani's sign).      Hernia: No hernia is present.      Comments: Lap incisions well healed   Musculoskeletal: Normal range of motion.   Skin:     General: Skin is  warm.   Neurological:      Mental Status: She is alert and oriented to person, place, and time.           ASSESSMENT/PLAN:     Acute cholecystitis  -     Case Request Operating Room: XI ROBOTIC CHOLECYSTECTOMY  -     Insert peripheral IV; Standing  -     Cancel: Full code; Standing  -     COVID-19 Rapid Screening; Standing    Other orders  -     Progressive Mobility Protocol (mobilize patient to their highest level of functioning at least twice daily); Standing      Details of the procedure, common risks, benefits, and alternatives discussed in detail during this visit. All questions answered and patient expressed understanding.     Most common risks:  Bleeding or hematoma  Infection /approximately 1%  Scarring  Retained gallstone < 1%  Collection of fluid / seroma   Bile leak / approximately < 1%  Damage to surrounding structures / approximately  Need for further procedures / including endoscopy for any of the above / approximately < 1%  Hernia / approximately < 1%  Respiratory failure < 1%  Pain  Blood clot < 1%  Death < 1%    Surgery takes approximately 1 hr and is done under general anesthetic.  It is considered outpatient surgery.  You will feel sore for up to 5 days. It will take roughly 3 weeks for complete healing to occur. You may return to work 5 days after the operation or earlier if you recovery quickly.           Angie Pagan

## 2020-11-24 NOTE — PROGRESS NOTES
Clinic Consult:  [facility] Consultation Note    Reason for Consult:  The primary encounter diagnosis was Upper abdominal pain. Diagnoses of Calculus of gallbladder without cholecystitis without obstruction and Elevated LFTs were also pertinent to this visit.    PCP: Primary Doctor No       CC: No chief complaint on file.  The patient location is: home  The chief complaint leading to consultation is: abdominal pain, nausea, vomiting    Visit type: audiovisual    Face to Face time with patient: 15 min  20 minutes of total time spent on the encounter, which includes face to face time and non-face to face time preparing to see the patient (eg, review of tests), Obtaining and/or reviewing separately obtained history, Documenting clinical information in the electronic or other health record, Independently interpreting results (not separately reported) and communicating results to the patient/family/caregiver, or Care coordination (not separately reported).     Each patient to whom he or she provides medical services by telemedicine is:  (1) informed of the relationship between the physician and patient and the respective role of any other health care provider with respect to management of the patient; and (2) notified that he or she may decline to receive medical services by telemedicine and may withdraw from such care at any time.    Notes:       HPI:  This is a 35 y.o. female here for evaluation of the above. Patient is reporting today for hospital follow up. Patient has had chronic abdominal pain with extensive workup. Last year workup included US and CT scan. US showed cholelithiasis without cholecystitis along with adenomyomatosis. During this workup, ventral hernia was found and repaired. Pain has come and go since last year.It has worsened over the last week which has brought her to the ER twice. Patient was given GI cocktail and Sucralfate and diagnosed with ulcers. LFTs in the ER were elevated with  ,  and Tbili 1.7. In addition to abdominal pain to the epigastric area, she is also suffering from nausea and vomiting after food intake. Denies hematemesis, hematochezia or melena.    Review of Systems   Constitutional: Positive for appetite change. Negative for activity change, chills, diaphoresis, fatigue, fever and unexpected weight change.   HENT: Negative for trouble swallowing.    Respiratory: Negative for cough and shortness of breath.    Cardiovascular: Negative for chest pain.   Gastrointestinal: Positive for abdominal pain, nausea and vomiting. Negative for abdominal distention, anal bleeding, blood in stool, constipation and diarrhea.   Genitourinary: Negative for dysuria.   Musculoskeletal: Positive for back pain.   Neurological: Negative for dizziness, weakness and light-headedness.   Psychiatric/Behavioral: Negative for dysphoric mood. The patient is not nervous/anxious.         Medical History:   Past Medical History:   Diagnosis Date    Asthma     childhood    Hypertension        Surgical History:   Past Surgical History:   Procedure Laterality Date    EXCISION OF MASS OF ABDOMEN N/A 11/27/2019    Procedure: EXCISION, MASS, ABDOMEN;  Surgeon: Angie Pagan MD;  Location: [facility] OR;  Service: General;  Laterality: N/A;  XI ROBOTIC      ROBOT-ASSISTED REPAIR OF VENTRAL HERNIA USING DA PHIL XI N/A 11/27/2019    Procedure: XI ROBOTIC REPAIR, HERNIA, VENTRAL;  Surgeon: Angie Pagan MD;  Location: [facility] OR;  Service: General;  Laterality: N/A;       Family History:    Family History   Problem Relation Age of Onset    Hypertension Father     Kidney disease Paternal Grandmother     Cancer Paternal Grandmother     Kidney disease Paternal Grandfather     Vision loss Paternal Grandfather     Hypertension Mother        Social History:   Social History     Socioeconomic History    Marital status: Single     Spouse name: Not on file    Number of children: Not on file    Years  of education: Not on file    Highest education level: Not on file   Occupational History     Employer: [employer]   Social Needs    Financial resource strain: Not hard at all    Food insecurity     Worry: Never true     Inability: Never true    Transportation needs     Medical: No     Non-medical: No   Tobacco Use    Smoking status: Never Smoker    Smokeless tobacco: Never Used   Substance and Sexual Activity    Alcohol use: No     Frequency: Never     Binge frequency: Never    Drug use: No    Sexual activity: Not Currently   Lifestyle    Physical activity     Days per week: 3 days     Minutes per session: 20 min    Stress: To some extent   Relationships    Social connections     Talks on phone: More than three times a week     Gets together: More than three times a week     Attends Islam service: Not on file     Active member of club or organization: Yes     Attends meetings of clubs or organizations: 1 to 4 times per year     Relationship status: Never    Other Topics Concern    Not on file   Social History Narrative    Not on file       Allergies:   Review of patient's allergies indicates:  No Known Allergies    Home Medications:   Current Outpatient Medications on File Prior to Visit   Medication Sig Dispense Refill    albuterol (PROVENTIL/VENTOLIN HFA) 90 mcg/actuation inhaler Inhale 2 puffs into the lungs every 6 (six) hours as needed for Wheezing. 18 g 6    amoxicillin-clavulanate 875-125mg (AUGMENTIN) 875-125 mg per tablet Take 1 tablet by mouth every 12 (twelve) hours. 14 tablet 0    benazepril-hydrochlorthiazide (LOTENSIN HCT) 10-12.5 mg Tab Take 1 tablet by mouth once daily. 30 tablet 5    labetaloL (NORMODYNE) 300 MG tablet Take 1 tablet (300 mg total) by mouth 3 (three) times daily. 30 tablet 5    norgestrel-ethinyl estradioL (LO/OVRAL) 0.3-30 mg-mcg per tablet Take 1 tablet by mouth once daily. 30 tablet 11    omeprazole (PRILOSEC) 40 MG capsule Take 1  capsule (40 mg total) by mouth once daily. 30 capsule 0    ondansetron (ZOFRAN-ODT) 4 MG TbDL Take 1 tablet (4 mg total) by mouth every 6 (six) hours as needed (nausea/vomiting). 20 tablet 0    pyrilamine-dextromethorphan (CAPRON DMT) 30-30 mg Tab Take 1 tablet by mouth 3 (three) times daily as needed (congestion). (Patient not taking: Reported on 9/10/2020) 20 tablet 1    sucralfate (CARAFATE) 1 gram tablet Take 1 tablet (1 g total) by mouth 4 (four) times daily. 120 tablet 0    traMADoL (ULTRAM) 50 mg tablet Take 1 tablet (50 mg total) by mouth every 6 (six) hours as needed. 10 tablet 0     No current facility-administered medications on file prior to visit.        Physical Exam:  There were no vitals taken for this visit.  There is no height or weight on file to calculate BMI.  Physical Exam      Labs: Pertinent labs reviewed.      Assessment:  1. Upper abdominal pain    2. Calculus of gallbladder without cholecystitis without obstruction    3. Elevated LFTs         Recommendations:  -Schedule US and repeat hepatic function panel. Due to elevated LFTS and history of cholelithiasis, high suspicion for gallbladder etiology.  -Remain on Omeprazole and sucralfate, as reflux/gastritis/ulcer not ruled out.  -Will discuss with general surgery.  -ER with worsening symptoms    Upper abdominal pain  -     US Abdomen Complete; Future; Expected date: 11/24/2020  -     Hepatic Function Panel; Future; Expected date: 11/24/2020    Calculus of gallbladder without cholecystitis without obstruction  -     US Abdomen Complete; Future; Expected date: 11/24/2020  -     Hepatic Function Panel; Future; Expected date: 11/24/2020    Elevated LFTs  -     US Abdomen Complete; Future; Expected date: 11/24/2020  -     Hepatic Function Panel; Future; Expected date: 11/24/2020        No follow-ups on file.    Thank you so much for allowing me to participate in the care of Raiza Guevara PA-C

## 2020-11-24 NOTE — LETTER
November 25, 2020      Natalia Guevara PA-C  [address] [place] [address] [address] [place] [address]           [facility] General [facility]  [address] [place] [address] [address] [address]  Phone: [phone]  Fax: [phone]          Patient: Raiza Mcpherson   MR Number: [id number]   YOB: 1985   Date of Visit: 11/24/2020       Dear Natalia Guevara:    Thank you for referring Raiza Mcpherson to me for evaluation. Attached you will find relevant portions of my assessment and plan of care.    If you have questions, please do not hesitate to call me. I look forward to following Raiza Mcpherson along with you.    Sincerely,    Angie Pagan MD    Enclosure  CC:  No Recipients    If you would like to receive this communication electronically, please contact [email or web address] or [phone] to request more information on [employer] Link access.    For providers and/or their staff who would like to refer a patient to [facility], please contact us through our one-stop-shop provider referral line, [facility], at [phone].    If you feel you have received this communication in error or would no longer like to receive these types of communications, please e-mail [email or web address]

## 2020-11-24 NOTE — H&P (VIEW-ONLY)
History & Physical  General Surgery      SUBJECTIVE:     Chief Complaint/Reason for Admission: Consult (Gall bladder)      History of Present Illness:  Patient is a 35 y.o. female presents with Consult (Gall bladder)    35 year old female known to me for prior robotic ventral hernia repair on 11/27/2019 referred for consultation by ZOHREH Gastelum for evaluation of symptomatic cholelithiasis.  The patient has had 2 recent ER visits with epigastric postprandial abdominal pain with evidence of transaminitis on laboratory evaluation.  She has a known history of cholelithiasis but prior evaluation with a HIDA showed hyperkinetic gallbladder without evidence of obstruction.  She endorses associated back pain but amylase and lipase were within normal limits.    No current facility-administered medications for this visit.   No current outpatient medications on file.    Facility-Administered Medications Ordered in Other Visits:     0.9%  NaCl infusion, , Intravenous, Continuous, Angie Pagan MD    cefazolin (ANCEF) 2 gram in dextrose 5% 50 mL IVPB (premix), 2 g, Intravenous, On Call Procedure, Angie Pagan MD    indocyanine green injection 2.5 mg, 2.5 mg, Intravenous, Once, Angie Pagna MD    nozaseptin (NOZIN) nasal , , Each Nostril, BID, Angie Pagan MD    Review of patient's allergies indicates:  No Known Allergies    Past Medical History:   Diagnosis Date    Asthma     childhood    Hypertension      Past Surgical History:   Procedure Laterality Date    EXCISION OF MASS OF ABDOMEN N/A 11/27/2019    Procedure: EXCISION, MASS, ABDOMEN;  Surgeon: Angie Pagan MD;  Location: [facility] OR;  Service: General;  Laterality: N/A;  XI ROBOTIC      ROBOT-ASSISTED REPAIR OF VENTRAL HERNIA USING DA PHIL XI N/A 11/27/2019    Procedure: XI ROBOTIC REPAIR, HERNIA, VENTRAL;  Surgeon: Angie Pagan MD;  Location: [facility] OR;  Service: General;  Laterality: N/A;     Family History    Problem Relation Age of Onset    Hypertension Father     Kidney disease Paternal Grandmother     Cancer Paternal Grandmother     Kidney disease Paternal Grandfather     Vision loss Paternal Grandfather     Hypertension Mother      Social History     Tobacco Use    Smoking status: Never Smoker    Smokeless tobacco: Never Used   Substance Use Topics    Alcohol use: No     Frequency: Never     Binge frequency: Never    Drug use: No        Review of Systems:  Review of Systems   Constitutional: Negative for unexpected weight change.   HENT: Negative for congestion.    Eyes: Negative for visual disturbance.   Respiratory: Negative for shortness of breath.    Cardiovascular: Negative for chest pain.   Gastrointestinal: Positive for abdominal pain and nausea. Negative for diarrhea.   Genitourinary: Negative for difficulty urinating.   Musculoskeletal: Positive for back pain.   Skin: Negative for rash.   Allergic/Immunologic: Negative for immunocompromised state.   Neurological: Negative for weakness.   Psychiatric/Behavioral: The patient is not nervous/anxious.        OBJECTIVE:     Vital Signs (Most Recent)  /74   Pulse 90   Temp 98.6 °F (37 °C) (Oral)   Wt 98.2 kg (216 lb 7.9 oz)   BMI 36.03 kg/m²     Physical Exam:   Physical Exam  Vitals signs reviewed.   Constitutional:       General: She is not in acute distress.     Appearance: She is well-developed. She is obese.   HENT:      Head: Normocephalic and atraumatic.   Neck:      Musculoskeletal: Neck supple.   Cardiovascular:      Rate and Rhythm: Normal rate and regular rhythm.   Pulmonary:      Effort: Pulmonary effort is normal.   Abdominal:      General: There is no distension.      Palpations: Abdomen is soft.      Tenderness: There is abdominal tenderness (epigastric and RUQ, + multani's sign).      Hernia: No hernia is present.      Comments: Lap incisions well healed   Musculoskeletal: Normal range of motion.   Skin:     General: Skin is  warm.   Neurological:      Mental Status: She is alert and oriented to person, place, and time.           ASSESSMENT/PLAN:     Acute cholecystitis  -     Case Request Operating Room: XI ROBOTIC CHOLECYSTECTOMY  -     Insert peripheral IV; Standing  -     Cancel: Full code; Standing  -     COVID-19 Rapid Screening; Standing    Other orders  -     Progressive Mobility Protocol (mobilize patient to their highest level of functioning at least twice daily); Standing      Details of the procedure, common risks, benefits, and alternatives discussed in detail during this visit. All questions answered and patient expressed understanding.     Most common risks:  Bleeding or hematoma  Infection /approximately 1%  Scarring  Retained gallstone < 1%  Collection of fluid / seroma   Bile leak / approximately < 1%  Damage to surrounding structures / approximately  Need for further procedures / including endoscopy for any of the above / approximately < 1%  Hernia / approximately < 1%  Respiratory failure < 1%  Pain  Blood clot < 1%  Death < 1%    Surgery takes approximately 1 hr and is done under general anesthetic.  It is considered outpatient surgery.  You will feel sore for up to 5 days. It will take roughly 3 weeks for complete healing to occur. You may return to work 5 days after the operation or earlier if you recovery quickly.           Angie Pagan

## 2020-11-25 ENCOUNTER — PATIENT MESSAGE (OUTPATIENT)
Dept: SURGERY | Facility: HOSPITAL | Age: 35
End: 2020-11-25

## 2020-11-25 ENCOUNTER — ANESTHESIA (OUTPATIENT)
Dept: SURGERY | Facility: HOSPITAL | Age: 35
End: 2020-11-25
Payer: COMMERCIAL

## 2020-11-25 ENCOUNTER — HOSPITAL ENCOUNTER (OUTPATIENT)
Facility: HOSPITAL | Age: 35
Discharge: HOME | End: 2020-11-25
Attending: SURGERY | Admitting: SURGERY
Payer: COMMERCIAL

## 2020-11-25 DIAGNOSIS — K81.0 ACUTE CHOLECYSTITIS: ICD-10-CM

## 2020-11-25 LAB
B-HCG UR QL: NEGATIVE
CTP QC/QA: YES
SARS-COV-2 RDRP RESP QL NAA+PROBE: NEGATIVE

## 2020-11-25 PROCEDURE — 25000003 PHARM REV CODE 250: Performed by: SURGERY

## 2020-11-25 PROCEDURE — U0002 COVID-19 LAB TEST NON-CDC: HCPCS

## 2020-11-25 PROCEDURE — 47562 LAPAROSCOPIC CHOLECYSTECTOMY: CPT | Mod: ,,, | Performed by: SURGERY

## 2020-11-25 PROCEDURE — 37000008 HC ANESTHESIA 1ST 15 MINUTES: Performed by: SURGERY

## 2020-11-25 PROCEDURE — 36000711: Performed by: SURGERY

## 2020-11-25 PROCEDURE — 71000033 HC RECOVERY, INTIAL HOUR: Performed by: SURGERY

## 2020-11-25 PROCEDURE — 63600175 PHARM REV CODE 636 W HCPCS: Performed by: NURSE ANESTHETIST, CERTIFIED REGISTERED

## 2020-11-25 PROCEDURE — 88304 PR  SURG PATH,LEVEL III: ICD-10-PCS | Mod: 26,,, | Performed by: PATHOLOGY

## 2020-11-25 PROCEDURE — 93010 ELECTROCARDIOGRAM REPORT: CPT | Mod: S$GLB,,, | Performed by: INTERNAL MEDICINE

## 2020-11-25 PROCEDURE — 37000009 HC ANESTHESIA EA ADD 15 MINS: Performed by: SURGERY

## 2020-11-25 PROCEDURE — 25000003 PHARM REV CODE 250: Performed by: NURSE ANESTHETIST, CERTIFIED REGISTERED

## 2020-11-25 PROCEDURE — 88304 TISSUE EXAM BY PATHOLOGIST: CPT | Performed by: PATHOLOGY

## 2020-11-25 PROCEDURE — 88304 TISSUE EXAM BY PATHOLOGIST: CPT | Mod: 26,,, | Performed by: PATHOLOGY

## 2020-11-25 PROCEDURE — 47562 PR LAP,CHOLECYSTECTOMY: ICD-10-PCS | Mod: ,,, | Performed by: SURGERY

## 2020-11-25 PROCEDURE — 27201423 OPTIME MED/SURG SUP & DEVICES STERILE SUPPLY: Performed by: SURGERY

## 2020-11-25 PROCEDURE — 71000039 HC RECOVERY, EACH ADD'L HOUR: Performed by: SURGERY

## 2020-11-25 PROCEDURE — 93010 EKG 12-LEAD: ICD-10-PCS | Mod: S$GLB,,, | Performed by: INTERNAL MEDICINE

## 2020-11-25 PROCEDURE — 63600175 PHARM REV CODE 636 W HCPCS: Performed by: ANESTHESIOLOGY

## 2020-11-25 PROCEDURE — 71000015 HC POSTOP RECOV 1ST HR: Performed by: SURGERY

## 2020-11-25 PROCEDURE — 81025 URINE PREGNANCY TEST: CPT | Performed by: SURGERY

## 2020-11-25 PROCEDURE — 63600175 PHARM REV CODE 636 W HCPCS: Performed by: SURGERY

## 2020-11-25 PROCEDURE — 36000710: Performed by: SURGERY

## 2020-11-25 RX ORDER — ROCURONIUM BROMIDE 10 MG/ML
INJECTION, SOLUTION INTRAVENOUS
Status: DISCONTINUED | OUTPATIENT
Start: 2020-11-25 | End: 2020-11-25

## 2020-11-25 RX ORDER — NEOSTIGMINE METHYLSULFATE 1 MG/ML
INJECTION, SOLUTION INTRAVENOUS
Status: DISCONTINUED | OUTPATIENT
Start: 2020-11-25 | End: 2020-11-25

## 2020-11-25 RX ORDER — AMOXICILLIN 250 MG
1 CAPSULE ORAL DAILY
Qty: 5 TABLET | Refills: 0 | Status: SHIPPED | OUTPATIENT
Start: 2020-11-25 | End: 2020-11-30

## 2020-11-25 RX ORDER — DIPHENHYDRAMINE HYDROCHLORIDE 50 MG/ML
25 INJECTION INTRAMUSCULAR; INTRAVENOUS EVERY 6 HOURS PRN
Status: DISCONTINUED | OUTPATIENT
Start: 2020-11-25 | End: 2020-11-25 | Stop reason: HOSPADM

## 2020-11-25 RX ORDER — HYDROCODONE BITARTRATE AND ACETAMINOPHEN 7.5; 325 MG/1; MG/1
1 TABLET ORAL EVERY 6 HOURS PRN
Qty: 20 TABLET | Refills: 0 | Status: SHIPPED | OUTPATIENT
Start: 2020-11-25 | End: 2020-11-30

## 2020-11-25 RX ORDER — FENTANYL CITRATE 50 UG/ML
INJECTION, SOLUTION INTRAMUSCULAR; INTRAVENOUS
Status: COMPLETED
Start: 2020-11-25 | End: 2020-11-25

## 2020-11-25 RX ORDER — PROPOFOL 10 MG/ML
VIAL (ML) INTRAVENOUS
Status: DISCONTINUED | OUTPATIENT
Start: 2020-11-25 | End: 2020-11-25

## 2020-11-25 RX ORDER — INDOCYANINE GREEN AND WATER 25 MG
2.5 KIT INJECTION ONCE
Status: COMPLETED | OUTPATIENT
Start: 2020-11-25 | End: 2020-11-25

## 2020-11-25 RX ORDER — LIDOCAINE HYDROCHLORIDE 10 MG/ML
INJECTION, SOLUTION EPIDURAL; INFILTRATION; INTRACAUDAL; PERINEURAL
Status: DISCONTINUED | OUTPATIENT
Start: 2020-11-25 | End: 2020-11-25

## 2020-11-25 RX ORDER — SODIUM CHLORIDE, SODIUM LACTATE, POTASSIUM CHLORIDE, CALCIUM CHLORIDE 600; 310; 30; 20 MG/100ML; MG/100ML; MG/100ML; MG/100ML
INJECTION, SOLUTION INTRAVENOUS CONTINUOUS PRN
Status: DISCONTINUED | OUTPATIENT
Start: 2020-11-25 | End: 2020-11-25

## 2020-11-25 RX ORDER — MEPERIDINE HYDROCHLORIDE 25 MG/ML
12.5 INJECTION INTRAMUSCULAR; INTRAVENOUS; SUBCUTANEOUS ONCE AS NEEDED
Status: DISCONTINUED | OUTPATIENT
Start: 2020-11-25 | End: 2020-11-25 | Stop reason: HOSPADM

## 2020-11-25 RX ORDER — PHENYLEPHRINE HYDROCHLORIDE 10 MG/ML
INJECTION INTRAVENOUS
Status: DISCONTINUED | OUTPATIENT
Start: 2020-11-25 | End: 2020-11-25

## 2020-11-25 RX ORDER — FENTANYL CITRATE 50 UG/ML
INJECTION, SOLUTION INTRAMUSCULAR; INTRAVENOUS
Status: DISCONTINUED | OUTPATIENT
Start: 2020-11-25 | End: 2020-11-25

## 2020-11-25 RX ORDER — HYDROMORPHONE HYDROCHLORIDE 2 MG/ML
0.2 INJECTION, SOLUTION INTRAMUSCULAR; INTRAVENOUS; SUBCUTANEOUS EVERY 5 MIN PRN
Status: DISCONTINUED | OUTPATIENT
Start: 2020-11-25 | End: 2020-11-25 | Stop reason: HOSPADM

## 2020-11-25 RX ORDER — CEFAZOLIN SODIUM 2 G/50ML
2 SOLUTION INTRAVENOUS
Status: DISCONTINUED | OUTPATIENT
Start: 2020-11-25 | End: 2020-11-25 | Stop reason: HOSPADM

## 2020-11-25 RX ORDER — SODIUM CHLORIDE 9 MG/ML
INJECTION, SOLUTION INTRAVENOUS CONTINUOUS
Status: DISCONTINUED | OUTPATIENT
Start: 2020-11-25 | End: 2020-11-25 | Stop reason: HOSPADM

## 2020-11-25 RX ORDER — METOCLOPRAMIDE HYDROCHLORIDE 5 MG/ML
10 INJECTION INTRAMUSCULAR; INTRAVENOUS EVERY 10 MIN PRN
Status: DISCONTINUED | OUTPATIENT
Start: 2020-11-25 | End: 2020-11-25 | Stop reason: HOSPADM

## 2020-11-25 RX ORDER — BUPIVACAINE HYDROCHLORIDE 2.5 MG/ML
INJECTION, SOLUTION EPIDURAL; INFILTRATION; INTRACAUDAL
Status: DISCONTINUED | OUTPATIENT
Start: 2020-11-25 | End: 2020-11-25 | Stop reason: HOSPADM

## 2020-11-25 RX ORDER — SODIUM CHLORIDE 0.9 % (FLUSH) 0.9 %
3 SYRINGE (ML) INJECTION EVERY 8 HOURS
Status: DISCONTINUED | OUTPATIENT
Start: 2020-11-25 | End: 2020-11-25 | Stop reason: HOSPADM

## 2020-11-25 RX ADMIN — SODIUM CHLORIDE, SODIUM LACTATE, POTASSIUM CHLORIDE, AND CALCIUM CHLORIDE: 600; 310; 30; 20 INJECTION, SOLUTION INTRAVENOUS at 10:11

## 2020-11-25 RX ADMIN — ROCURONIUM BROMIDE 40 MG: 10 INJECTION, SOLUTION INTRAVENOUS at 10:11

## 2020-11-25 RX ADMIN — GLYCOPYRROLATE 0.8 MG: 0.2 INJECTION, SOLUTION INTRAMUSCULAR; INTRAVENOUS at 11:11

## 2020-11-25 RX ADMIN — HYDROMORPHONE HYDROCHLORIDE 0.2 MG: 2 INJECTION INTRAMUSCULAR; INTRAVENOUS; SUBCUTANEOUS at 12:11

## 2020-11-25 RX ADMIN — FENTANYL CITRATE 50 MCG: 50 INJECTION, SOLUTION INTRAMUSCULAR; INTRAVENOUS at 10:11

## 2020-11-25 RX ADMIN — PROPOFOL 150 MG: 10 INJECTION, EMULSION INTRAVENOUS at 10:11

## 2020-11-25 RX ADMIN — HYDROMORPHONE HYDROCHLORIDE 0.2 MG: 2 INJECTION INTRAMUSCULAR; INTRAVENOUS; SUBCUTANEOUS at 01:11

## 2020-11-25 RX ADMIN — INDOCYANINE GREEN 2.5 MG: KIT INTRAVENOUS at 11:11

## 2020-11-25 RX ADMIN — FENTANYL CITRATE 50 MCG: 50 INJECTION, SOLUTION INTRAMUSCULAR; INTRAVENOUS at 12:11

## 2020-11-25 RX ADMIN — LIDOCAINE HYDROCHLORIDE 50 MG: 10 INJECTION, SOLUTION EPIDURAL; INFILTRATION; INTRACAUDAL; PERINEURAL at 10:11

## 2020-11-25 RX ADMIN — NEOSTIGMINE METHYLSULFATE 5 MG: 1 INJECTION INTRAVENOUS at 11:11

## 2020-11-25 RX ADMIN — CEFAZOLIN SODIUM 2 G: 2 SOLUTION INTRAVENOUS at 11:11

## 2020-11-25 RX ADMIN — PHENYLEPHRINE HYDROCHLORIDE 200 MCG: 10 INJECTION INTRAVENOUS at 11:11

## 2020-11-25 NOTE — DISCHARGE INSTRUCTIONS
Take medications (both pain medication and stool softener)  as prescribed.  Your Dermabond will stay on the incisions for approximately 2-3 weeks.  Okay to peel Dermabond away from the skin once the edges began to raise.   Okay to shower tomorrow.  No tub baths for approximately 1 week.  Call clinic for temperature greater than 101, bleeding or drainage from surgical sites, pain uncontrolled with medications, or nausea or emesis.

## 2020-11-25 NOTE — TRANSFER OF CARE
"Anesthesia Transfer of Care Note    Patient: Raiza Mcpherson    Procedure(s) Performed: Procedure(s) (LRB):  XI ROBOTIC CHOLECYSTECTOMY (N/A)    Patient location: PACU    Anesthesia Type: general    Transport from OR: Transported from OR on room air with adequate spontaneous ventilation    Post pain: adequate analgesia    Post assessment: no apparent anesthetic complications    Post vital signs: stable    Level of consciousness: responds to stimulation    Nausea/Vomiting: no nausea/vomiting    Complications: none    Transfer of care protocol was followed      Last vitals:   Visit Vitals  /67   Pulse 75   Temp 36.7 °C (98.1 °F)   Resp 18   Ht 5' 4" (1.626 m)   Wt 95.1 kg (209 lb 12.3 oz)   SpO2 100%   Breastfeeding No   BMI 36.01 kg/m²     "

## 2020-11-25 NOTE — INTERVAL H&P NOTE
The patient has been examined and the H&P has been reviewed:    I concur with the findings and no changes have occurred since H&P was written.    Surgery risks, benefits and alternative options discussed and understood by patient/family.          Active Hospital Problems    Diagnosis  POA    Acute cholecystitis [K81.0]  Yes      Resolved Hospital Problems   No resolved problems to display.

## 2020-11-25 NOTE — ANESTHESIA RELEASE NOTE
"Anesthesia Release from PACU Note    Patient: Raiza Mcpherson    Procedure(s) Performed: Procedure(s) (LRB):  XI ROBOTIC CHOLECYSTECTOMY (N/A)    Anesthesia type: general    Post pain: Adequate analgesia    Post assessment: no apparent anesthetic complications    Last Vitals:   Visit Vitals  /67   Pulse 75   Temp 36.7 °C (98.1 °F)   Resp 18   Ht 5' 4" (1.626 m)   Wt 95.1 kg (209 lb 12.3 oz)   SpO2 100%   Breastfeeding No   BMI 36.01 kg/m²       Post vital signs: stable    Level of consciousness: awake    Nausea/Vomiting: no nausea/no vomiting    Complications: none    Airway Patency: patent    Respiratory: unassisted    Cardiovascular: stable and blood pressure at baseline    Hydration: euvolemic  "

## 2020-11-25 NOTE — ANESTHESIA PREPROCEDURE EVALUATION
11/25/2020  Raiza Mcpherson is a 35 y.o., female.    Anesthesia Evaluation    I have reviewed the Patient Summary Reports.    I have reviewed the Nursing Notes.   I have reviewed the Medications.     Review of Systems  Anesthesia Hx:  No previous Anesthesia  Neg history of prior surgery.   Social:  Non-Smoker    Hematology/Oncology:     Oncology Normal    -- Denies Anemia:   EENT/Dental:EENT/Dental Normal   Cardiovascular:   Hypertension ECG has been reviewed.    Pulmonary:   Asthma    Renal/:  Renal/ Normal     Hepatic/GI:  Hepatic/GI Normal    Musculoskeletal:  Musculoskeletal Normal    Neurological:  Neurology Normal    Endocrine:  Endocrine Normal    Dermatological:  Skin Normal    Psych:  Psychiatric Normal           Physical Exam  General:  Obesity    Airway/Jaw/Neck:  Airway Findings: Mouth Opening: Normal Tongue: Normal  Mallampati: III  TM Distance: Normal, at least 6 cm      Dental:  Dental Findings: In tact   Chest/Lungs:  Chest/Lungs Findings: Clear to auscultation     Heart/Vascular:  Heart Findings: Rate: Normal     Abdomen:  Abdomen Findings:  Normal            Anesthesia Plan  Type of Anesthesia, risks & benefits discussed:  Anesthesia Type:  general  Patient's Preference:   Intra-op Monitoring Plan: standard ASA monitors  Intra-op Monitoring Plan Comments:   Post Op Pain Control Plan:   Post Op Pain Control Plan Comments:   Induction:   IV  Beta Blocker:  Patient is on a Beta-Blocker and has not received dose within the past 24 hours due to non-compliance or for other reasons (Patient should receive a perioperative dose or document why it is withheld). Perioperative Beta Blocker not given due to: Other (see comments)      Informed Consent: Patient understands risks and agrees with Anesthesia plan.  Questions answered. Anesthesia consent signed with patient.  ASA Score: 2     Day  of Surgery Review of History & Physical: I have interviewed and examined the patient. I have reviewed the patient's H&P dated:  There are no significant changes.          Ready For Surgery From Anesthesia Perspective.     Patient Active Problem List   Diagnosis    Ventral hernia without obstruction or gangrene    Cholelithiasis without cholecystitis    Endometrioma    Obesity    Essential hypertension    History of asthma    Acute cholecystitis       Chemistry        Component Value Date/Time     11/20/2020 2358    K 4.4 11/20/2020 2358     11/20/2020 2358    CO2 25 11/20/2020 2358    BUN 11 11/20/2020 2358    CREATININE 1.1 11/20/2020 2358    GLU 86 11/20/2020 2358        Component Value Date/Time    CALCIUM 9.0 11/20/2020 2358    ALKPHOS 76 11/20/2020 2358     (H) 11/20/2020 2358     (H) 11/20/2020 2358    BILITOT 1.7 (H) 11/20/2020 2358    ESTGFRAFRICA >60 11/20/2020 2358    EGFRNONAA >60 11/20/2020 2358        Lab Results   Component Value Date    WBC 4.75 11/26/2019    HGB 11.6 (L) 11/26/2019    HCT 37.7 11/26/2019    MCV 86 11/26/2019     11/26/2019       Normal sinus rhythm  Normal ECG  When compared with ECG of 04-FEB-2014 15:04,  No significant change was found  Confirmed by CHELSEY CA TUNDE ([id number]) on 11/25/2019 4:41:10 PM                                                                                                             11/25/2020  Raiza Mcpherson is a 35 y.o., female.    Anesthesia Evaluation    I have reviewed the Patient Summary Reports.    I have reviewed the Nursing Notes. I have reviewed the NPO Status.   I have reviewed the Medications.     Review of Systems  Anesthesia Hx:  No problems with previous Anesthesia  History of prior surgery of interest to airway management or planning: Denies Family Hx of Anesthesia complications.   Denies Personal Hx of Anesthesia complications.   Hematology/Oncology:  Hematology Normal   Oncology Normal      EENT/Dental:EENT/Dental Normal   Cardiovascular:   Exercise tolerance: good Hypertension    Pulmonary:   Asthma mild S/p URI 9/20; symptoms improved and minimal; received steroid injection   Renal/:  Renal/ Normal     Hepatic/GI:   PUD, GERD    Musculoskeletal:  Musculoskeletal Normal    Neurological:  Neurology Normal    Endocrine:  Endocrine Normal    Dermatological:  Skin Normal    Psych:   anxiety          Physical Exam  General:  Obesity    Airway/Jaw/Neck:  Airway Findings: Mouth Opening: Normal Tongue: Normal  General Airway Assessment: Adult  Mallampati: II  TM Distance: Normal, at least 6 cm      Dental:  Dental Findings: In tact, Lower retainer   Chest/Lungs:  Chest/Lungs Findings: Clear to auscultation     Heart/Vascular:  Heart Findings: Rate: Normal  Rhythm: Regular Rhythm  Heart murmur: negative       Mental Status:  Mental Status Findings:  Cooperative, Alert and Oriented         Anesthesia Plan  Type of Anesthesia, risks & benefits discussed:  Anesthesia Type:  general  Patient's Preference:   Intra-op Monitoring Plan: standard ASA monitors  Intra-op Monitoring Plan Comments:   Post Op Pain Control Plan: multimodal analgesia  Post Op Pain Control Plan Comments:   Induction:   IV  Beta Blocker:  Patient is on a Beta-Blocker and has received one dose within the past 24 hours (No further documentation required).       Informed Consent: Patient understands risks and agrees with Anesthesia plan.  Questions answered. Anesthesia consent signed with patient.  ASA Score: 2     Day of Surgery Review of History & Physical:  There are no significant changes.          Ready For Surgery From Anesthesia Perspective.

## 2020-11-25 NOTE — OP NOTE
[facility] -   General Surgery  Operative Note    SUMMARY     Date of Procedure: 11/25/2020     Procedure: Procedure(s) (LRB):  XI ROBOTIC CHOLECYSTECTOMY (N/A)       Surgeon(s) and Role:     * Angie Pagan MD - Primary    Assisting Surgeon: None    Pre-Operative Diagnosis: Acute cholecystitis [K81.0]    Post-Operative Diagnosis: Post-Op Diagnosis Codes:     * Acute cholecystitis [K81.0]    Anesthesia: General    Drains, Packs, Catheters: none    Estimated Blood Loss (EBL): 3 cc           Implants: * No implants in log *    Specimens:   Specimen (12h ago, onward)    None           Description of the Findings of the Procedure: critical view obtained. Evidence of cholecystitis.     Significant Surgical Tasks Conducted by the Assistant(s), if Applicable: none    Complications: No    Procedure in detail: The patient was brought to the operating room and laid on the operating table in the supine position. General endotracheal anesthesia was induced. The abdomen was prepped and draped in a sterile fashion. A time out was performed to verify patient identification, correct surgical site, IV antibiotic administration, and other pertinent details of the case.     An incision was made at the left abdomen. The fascia was elevated and the Veress needle was inserted. Proper position was confirmed by aspiration and saline meniscus test. The abdomen was insufflated with carbon dioxide to a pressure of 15 mmHg. The patient tolerated insufflation well. An 8-mm trocar was then inserted using Optiview technique.     The laparoscope was inserted and the abdomen inspected. No injuries from the initial trocar placement were noted. 3 secondary 8-mm trocars were then inserted in line with the camera port and middle port elevated to avoid prior hernia repair. The abdomen was inspected and no gross abnormalities were found.  The table was placed in the reverse Trendelenburg position with the right side elevated. The  robot was docked in standard fashion.     The dome of the gallbladder was grasped with an atraumatic grasper and retracted over the dome of the liver. The infundibulum was also grasped with an atraumatic grasper and retracted toward the right lower quadrant. This maneuver exposed Calot's triangle. The peritoneum overlying the gallbladder infundibulum was then incised and the cystic duct and cystic artery were identified and circumferentially dissected. The critical view was obtained. Firefly confirmed the positioning of the common bile duct and cystic duct. The cystic duct and cystic artery were then clipped and divided close to the gallbladder.     The gallbladder was then dissected from its peritoneal attachments by electrocautery.   Hemostasis was checked and the gallbladder was removed through the right lateral port. The gallbladder was passed off the table as a specimen. here was no evidence of bleeding of the gallbladder fossa or cystic artery or leakage of bile from the cystic duct stump. The right lateral port site was closed with a 0 vicryl suture passer laparoscopically.     Secondary trocars were removed under direct visualization. No bleeding was noted.  The skin for all ports was closed with 4-0 Monocryl subcuticular sutures.     The patient tolerated the procedure well and was taken to the postanesthesia care unit in stable condition.       Condition: Stable    Disposition: PACU - hemodynamically stable.    Attestation: I performed the procedure.    Angie Pagan

## 2020-11-25 NOTE — ANESTHESIA POSTPROCEDURE EVALUATION
Anesthesia Post Evaluation    Patient: Raiza Mcpherson    Procedure(s) Performed: Procedure(s) (LRB):  XI ROBOTIC CHOLECYSTECTOMY (N/A)    Final Anesthesia Type: general    Patient location during evaluation: PACU  Patient participation: No - Unable to Participate, Sedation  Level of consciousness: responds to stimulation  Post-procedure vital signs: reviewed and stable  Pain management: adequate  Airway patency: patent    PONV status at discharge: No PONV  Anesthetic complications: no      Cardiovascular status: blood pressure returned to baseline  Respiratory status: unassisted and room air  Hydration status: euvolemic  Follow-up not needed.          Vitals Value Taken Time   /65 11/25/20 1216   Temp 37.4 11/25/20 1217   Pulse 80 11/25/20 1217   Resp 15 11/25/20 1217   SpO2 100 % 11/25/20 1217   Vitals shown include unvalidated device data.      No case tracking events are documented in the log.      Pain/Kalpana Score: No data recorded

## 2020-11-25 NOTE — ANESTHESIA PROCEDURE NOTES
Intubation  Performed by: Jeanne Trimble CRNA  Authorized by: Todd Gallo II, MD     Intubation:     Induction:  Intravenous    Intubated:  Postinduction    Mask Ventilation:  Easy mask    Attempts:  1    Attempted By:  Student (MINNIE Fritz)    Method of Intubation:  Direct    Blade:  Cox 2    Laryngeal View Grade: Grade I - full view of chords      Difficult Airway Encountered?: No      Complications:  None    Airway Device:  Oral endotracheal tube    Airway Device Size:  7.5    Style/Cuff Inflation:  Cuffed (inflated to minimal occlusive pressure)    Inflation Amount (mL):  7    Tube secured:  22    Secured at:  The lips    Placement Verified By:  Capnometry    Complicating Factors:  None    Findings Post-Intubation:  BS equal bilateral and atraumatic/condition of teeth unchanged

## 2020-11-25 NOTE — DISCHARGE SUMMARY
[facility]  Discharge Note  Short Stay    Procedure(s) (LRB):  XI ROBOTIC CHOLECYSTECTOMY (N/A)    OUTCOME: Patient tolerated treatment/procedure well without complication and is now ready for discharge.    DISPOSITION: Home or Self Care    FINAL DIAGNOSIS:  S/p noris      FOLLOWUP: In clinic    DISCHARGE INSTRUCTIONS:    Discharge Procedure Orders   Diet Adult Regular     Notify your health care provider if you experience any of the following:  redness, tenderness, or signs of infection (pain, swelling, redness, odor or green/yellow discharge around incision site)     No dressing needed     Activity as tolerated

## 2020-11-27 ENCOUNTER — TELEPHONE (OUTPATIENT)
Dept: SURGERY | Facility: CLINIC | Age: 35
End: 2020-11-27

## 2020-11-29 ENCOUNTER — NURSE TRIAGE (OUTPATIENT)
Dept: ADMINISTRATIVE | Facility: CLINIC | Age: 35
End: 2020-11-29

## 2020-11-29 NOTE — TELEPHONE ENCOUNTER
Reason for Disposition   [1] Caller has URGENT question AND [2] triager unable to answer question    Additional Information   Negative: Sounds like a life-threatening emergency to the triager   Negative: Chest pain   Negative: Difficulty breathing   Negative: Acting confused (e.g., disoriented, slurred speech) or excessively sleepy   Negative: [1] Discomfort (pain, burning or stinging) when passing urine AND [2] male   Negative: Surgical incision symptoms and questions   Negative: [1] Discomfort (pain, burning or stinging) when passing urine AND [2] female   Negative: New or worsening leg (calf, thigh) pain   Negative: New or worsening leg swelling   Negative: Dizziness is severe, or persists > 24 hours after surgery   Negative: Constipation   Negative: Pain, redness, swelling, or pus at IV Site   Negative: Symptoms arising from use of a urinary catheter ([place] or Coude)   Negative: Cast problems or questions   Negative: Medication question   Negative: [1] Widespread rash AND [2] bright red, sunburn-like   Negative: [1] SEVERE headache AND [2] after spinal (epidural) anesthesia   Negative: [1] Vomiting AND [2] persists > 4 hours   Negative: [1] Vomiting AND [2] abdomen looks much more swollen than usual   Negative: [1] Drinking very little AND [2] dehydration suspected (e.g., no urine > 12 hours, very dry mouth, very lightheaded)   Negative: Patient sounds very sick or weak to the triager   Negative: Sounds like a serious complication to the triager   Negative: Fever > 100.4 F (38.0 C)   Negative: [1] SEVERE post-op pain (e.g., excruciating, pain scale 8-10) AND [2] not controlled with pain medications    Protocols used: POST-OP SYMPTOMS AND [id number]-Y-MM    Pt's Spouse stated she had her gallbladder removed on 11/25/2020. Stated she has not had a bowel movement since 11/23/2020. Provider on call Dr. KIP Lares responded on secure chat and stated tell her to take any OTC laxative and  follow up with the Surgeon. Spouse verbalized understanding.

## 2020-12-04 VITALS
SYSTOLIC BLOOD PRESSURE: 115 MMHG | RESPIRATION RATE: 15 BRPM | OXYGEN SATURATION: 97 % | WEIGHT: 209.75 LBS | BODY MASS INDEX: 35.81 KG/M2 | HEIGHT: 64 IN | DIASTOLIC BLOOD PRESSURE: 59 MMHG | HEART RATE: 90 BPM | TEMPERATURE: 99 F

## 2020-12-07 LAB
FINAL PATHOLOGIC DIAGNOSIS: NORMAL
GROSS: NORMAL
Lab: NORMAL

## 2021-02-11 DIAGNOSIS — I10 ESSENTIAL HYPERTENSION: ICD-10-CM

## 2021-02-11 RX ORDER — BENAZEPRIL HYDROCHLORIDE AND HYDROCHLOROTHIAZIDE 10; 12.5 MG/1; MG/1
1 TABLET ORAL DAILY
Qty: 30 TABLET | Refills: 5 | Status: SHIPPED | OUTPATIENT
Start: 2021-02-11 | End: 2021-08-06 | Stop reason: DRUGHIGH

## 2021-03-23 ENCOUNTER — OFFICE VISIT (OUTPATIENT)
Dept: SURGERY | Facility: CLINIC | Age: 36
End: 2021-03-23
Payer: COMMERCIAL

## 2021-03-23 ENCOUNTER — LAB VISIT (OUTPATIENT)
Dept: LAB | Facility: HOSPITAL | Age: 36
End: 2021-03-23
Attending: SURGERY
Payer: COMMERCIAL

## 2021-03-23 VITALS
SYSTOLIC BLOOD PRESSURE: 125 MMHG | DIASTOLIC BLOOD PRESSURE: 77 MMHG | WEIGHT: 223.75 LBS | TEMPERATURE: 99 F | HEART RATE: 81 BPM | BODY MASS INDEX: 38.41 KG/M2

## 2021-03-23 DIAGNOSIS — G89.18 PAIN AT SURGICAL SITE: ICD-10-CM

## 2021-03-23 DIAGNOSIS — G89.18 PAIN AT SURGICAL SITE: Primary | ICD-10-CM

## 2021-03-23 PROBLEM — K43.9 VENTRAL HERNIA WITHOUT OBSTRUCTION OR GANGRENE: Status: RESOLVED | Noted: 2019-09-05 | Resolved: 2021-03-23

## 2021-03-23 PROBLEM — K80.20 CHOLELITHIASIS WITHOUT CHOLECYSTITIS: Status: RESOLVED | Noted: 2019-09-05 | Resolved: 2021-03-23

## 2021-03-23 PROBLEM — K81.0 ACUTE CHOLECYSTITIS: Status: RESOLVED | Noted: 2020-11-25 | Resolved: 2021-03-23

## 2021-03-23 LAB
CREAT SERPL-MCNC: 1 MG/DL (ref 0.5–1.4)
EST. GFR  (AFRICAN AMERICAN): >60 ML/MIN/1.73 M^2
EST. GFR  (NON AFRICAN AMERICAN): >60 ML/MIN/1.73 M^2

## 2021-03-23 PROCEDURE — 82565 ASSAY OF CREATININE: CPT | Performed by: SURGERY

## 2021-03-23 PROCEDURE — 99213 PR OFFICE/OUTPT VISIT, EST, LEVL III, 20-29 MIN: ICD-10-PCS | Mod: S$GLB,,, | Performed by: SURGERY

## 2021-03-23 PROCEDURE — 36415 COLL VENOUS BLD VENIPUNCTURE: CPT | Performed by: SURGERY

## 2021-03-23 PROCEDURE — 99999 PR PBB SHADOW E&M-EST. PATIENT-LVL III: CPT | Mod: PBBFAC,,, | Performed by: SURGERY

## 2021-03-23 PROCEDURE — 99213 OFFICE O/P EST LOW 20 MIN: CPT | Mod: S$GLB,,, | Performed by: SURGERY

## 2021-03-23 PROCEDURE — 99999 PR PBB SHADOW E&M-EST. PATIENT-LVL III: ICD-10-PCS | Mod: PBBFAC,,, | Performed by: SURGERY

## 2021-03-24 ENCOUNTER — TELEPHONE (OUTPATIENT)
Dept: SURGERY | Facility: CLINIC | Age: 36
End: 2021-03-24

## 2021-03-25 ENCOUNTER — HOSPITAL ENCOUNTER (OUTPATIENT)
Dept: RADIOLOGY | Facility: HOSPITAL | Age: 36
Discharge: HOME | End: 2021-03-25
Attending: SURGERY
Payer: COMMERCIAL

## 2021-03-25 DIAGNOSIS — G89.18 PAIN AT SURGICAL SITE: ICD-10-CM

## 2021-03-25 PROCEDURE — 74177 CT ABDOMEN PELVIS WITH CONTRAST: ICD-10-PCS | Mod: 26,,, | Performed by: RADIOLOGY

## 2021-03-25 PROCEDURE — 25500020 PHARM REV CODE 255: Performed by: SURGERY

## 2021-03-25 PROCEDURE — 74177 CT ABD & PELVIS W/CONTRAST: CPT | Mod: TC

## 2021-03-25 PROCEDURE — 74177 CT ABD & PELVIS W/CONTRAST: CPT | Mod: 26,,, | Performed by: RADIOLOGY

## 2021-03-25 PROCEDURE — A9698 NON-RAD CONTRAST MATERIALNOC: HCPCS | Performed by: SURGERY

## 2021-03-25 RX ADMIN — IOHEXOL 75 ML: 350 INJECTION, SOLUTION INTRAVENOUS at 12:03

## 2021-03-25 RX ADMIN — IOHEXOL 1000 ML: 12 SOLUTION ORAL at 11:03

## 2021-03-26 ENCOUNTER — TELEPHONE (OUTPATIENT)
Dept: SURGERY | Facility: HOSPITAL | Age: 36
End: 2021-03-26

## 2021-03-26 ENCOUNTER — TELEPHONE (OUTPATIENT)
Dept: SURGERY | Facility: CLINIC | Age: 36
End: 2021-03-26

## 2021-03-26 ENCOUNTER — PATIENT MESSAGE (OUTPATIENT)
Dept: SURGERY | Facility: CLINIC | Age: 36
End: 2021-03-26

## 2021-03-26 DIAGNOSIS — Z01.818 PRE-OP TESTING: Primary | ICD-10-CM

## 2021-03-26 DIAGNOSIS — K43.2 INCISIONAL HERNIA OF ANTERIOR ABDOMINAL WALL WITHOUT OBSTRUCTION OR GANGRENE: Primary | ICD-10-CM

## 2021-03-26 RX ORDER — SODIUM CHLORIDE 9 MG/ML
INJECTION, SOLUTION INTRAVENOUS CONTINUOUS
Status: CANCELLED | OUTPATIENT
Start: 2021-03-26

## 2021-03-30 ENCOUNTER — TELEPHONE (OUTPATIENT)
Dept: PREADMISSION TESTING | Facility: HOSPITAL | Age: 36
End: 2021-03-30

## 2021-03-31 ENCOUNTER — TELEPHONE (OUTPATIENT)
Dept: PREADMISSION TESTING | Facility: HOSPITAL | Age: 36
End: 2021-03-31

## 2021-04-15 ENCOUNTER — TELEPHONE (OUTPATIENT)
Dept: SURGERY | Facility: CLINIC | Age: 36
End: 2021-04-15

## 2021-04-28 ENCOUNTER — PATIENT MESSAGE (OUTPATIENT)
Dept: RESEARCH | Facility: HOSPITAL | Age: 36
End: 2021-04-28

## 2021-05-19 ENCOUNTER — OFFICE VISIT (OUTPATIENT)
Dept: INTERNAL MEDICINE | Facility: CLINIC | Age: 36
End: 2021-05-19
Payer: COMMERCIAL

## 2021-05-19 DIAGNOSIS — J06.9 VIRAL URI WITH COUGH: Primary | ICD-10-CM

## 2021-05-19 PROCEDURE — 99213 OFFICE O/P EST LOW 20 MIN: CPT | Mod: 95,,, | Performed by: INTERNAL MEDICINE

## 2021-05-19 PROCEDURE — 99213 PR OFFICE/OUTPT VISIT, EST, LEVL III, 20-29 MIN: ICD-10-PCS | Mod: 95,,, | Performed by: INTERNAL MEDICINE

## 2021-05-19 RX ORDER — METHYLPREDNISOLONE 4 MG/1
TABLET ORAL
Qty: 1 PACKAGE | Refills: 0 | Status: SHIPPED | OUTPATIENT
Start: 2021-05-19 | End: 2021-06-09

## 2021-06-28 PROBLEM — G89.18 PAIN AT SURGICAL SITE: Status: RESOLVED | Noted: 2021-03-23 | Resolved: 2021-06-28

## 2021-07-02 ENCOUNTER — OFFICE VISIT (OUTPATIENT)
Dept: FAMILY MEDICINE | Facility: CLINIC | Age: 36
End: 2021-07-02
Payer: COMMERCIAL

## 2021-07-02 DIAGNOSIS — I10 ESSENTIAL HYPERTENSION: ICD-10-CM

## 2021-07-02 DIAGNOSIS — R13.10 ABNORMAL SWALLOWING: Primary | ICD-10-CM

## 2021-07-02 PROCEDURE — 99214 PR OFFICE/OUTPT VISIT, EST, LEVL IV, 30-39 MIN: ICD-10-PCS | Mod: 95,,, | Performed by: NURSE PRACTITIONER

## 2021-07-02 PROCEDURE — 99214 OFFICE O/P EST MOD 30 MIN: CPT | Mod: 95,,, | Performed by: NURSE PRACTITIONER

## 2021-07-02 RX ORDER — LABETALOL 300 MG/1
300 TABLET, FILM COATED ORAL 3 TIMES DAILY
Qty: 30 TABLET | Refills: 0 | Status: SHIPPED | OUTPATIENT
Start: 2021-07-02 | End: 2021-07-13

## 2021-08-06 ENCOUNTER — PATIENT MESSAGE (OUTPATIENT)
Dept: INTERNAL MEDICINE | Facility: CLINIC | Age: 36
End: 2021-08-06

## 2021-08-06 ENCOUNTER — HOSPITAL ENCOUNTER (OUTPATIENT)
Dept: RADIOLOGY | Facility: HOSPITAL | Age: 36
Discharge: HOME | End: 2021-08-06
Attending: FAMILY MEDICINE
Payer: COMMERCIAL

## 2021-08-06 ENCOUNTER — OFFICE VISIT (OUTPATIENT)
Dept: INTERNAL MEDICINE | Facility: CLINIC | Age: 36
End: 2021-08-06
Payer: COMMERCIAL

## 2021-08-06 ENCOUNTER — PATIENT MESSAGE (OUTPATIENT)
Dept: ADMINISTRATIVE | Facility: OTHER | Age: 36
End: 2021-08-06

## 2021-08-06 VITALS
OXYGEN SATURATION: 98 % | TEMPERATURE: 98 F | BODY MASS INDEX: 39.05 KG/M2 | WEIGHT: 227.5 LBS | HEART RATE: 77 BPM | SYSTOLIC BLOOD PRESSURE: 126 MMHG | DIASTOLIC BLOOD PRESSURE: 80 MMHG

## 2021-08-06 DIAGNOSIS — J30.1 SEASONAL ALLERGIC RHINITIS DUE TO POLLEN: ICD-10-CM

## 2021-08-06 DIAGNOSIS — I10 ESSENTIAL HYPERTENSION: ICD-10-CM

## 2021-08-06 DIAGNOSIS — N30.00 ACUTE CYSTITIS WITHOUT HEMATURIA: ICD-10-CM

## 2021-08-06 DIAGNOSIS — J45.21 MILD INTERMITTENT REACTIVE AIRWAY DISEASE WITH WHEEZING WITH ACUTE EXACERBATION: Primary | ICD-10-CM

## 2021-08-06 DIAGNOSIS — Z29.9 PREVENTIVE MEASURE: ICD-10-CM

## 2021-08-06 DIAGNOSIS — J45.21 MILD INTERMITTENT REACTIVE AIRWAY DISEASE WITH WHEEZING WITH ACUTE EXACERBATION: ICD-10-CM

## 2021-08-06 DIAGNOSIS — E66.01 SEVERE OBESITY (BMI 35.0-39.9) WITH COMORBIDITY: ICD-10-CM

## 2021-08-06 PROBLEM — J45.20 MILD INTERMITTENT ASTHMA WITHOUT COMPLICATION: Status: ACTIVE | Noted: 2021-08-06

## 2021-08-06 PROBLEM — L30.9 ECZEMA: Status: ACTIVE | Noted: 2021-08-06

## 2021-08-06 PROBLEM — Z87.09 HISTORY OF ASTHMA: Status: RESOLVED | Noted: 2020-04-01 | Resolved: 2021-08-06

## 2021-08-06 PROBLEM — J45.909 ASTHMA: Status: ACTIVE | Noted: 2021-08-06

## 2021-08-06 PROBLEM — N80.129 ENDOMETRIOMA: Status: RESOLVED | Noted: 2019-09-05 | Resolved: 2021-08-06

## 2021-08-06 PROCEDURE — 71046 X-RAY EXAM CHEST 2 VIEWS: CPT | Mod: TC

## 2021-08-06 PROCEDURE — 99999 PR PBB SHADOW E&M-EST. PATIENT-LVL IV: CPT | Mod: PBBFAC,,, | Performed by: FAMILY MEDICINE

## 2021-08-06 PROCEDURE — 99214 OFFICE O/P EST MOD 30 MIN: CPT | Mod: S$GLB,,, | Performed by: FAMILY MEDICINE

## 2021-08-06 PROCEDURE — 99214 PR OFFICE/OUTPT VISIT, EST, LEVL IV, 30-39 MIN: ICD-10-PCS | Mod: S$GLB,,, | Performed by: FAMILY MEDICINE

## 2021-08-06 PROCEDURE — 99999 PR PBB SHADOW E&M-EST. PATIENT-LVL IV: ICD-10-PCS | Mod: PBBFAC,,, | Performed by: FAMILY MEDICINE

## 2021-08-06 PROCEDURE — 71046 XR CHEST PA AND LATERAL: ICD-10-PCS | Mod: 26,,, | Performed by: RADIOLOGY

## 2021-08-06 PROCEDURE — 71046 X-RAY EXAM CHEST 2 VIEWS: CPT | Mod: 26,,, | Performed by: RADIOLOGY

## 2021-08-06 RX ORDER — BENAZEPRIL/HYDROCHLOROTHIAZIDE 20 MG-25MG
1 TABLET ORAL DAILY
Qty: 30 TABLET | Refills: 3 | Status: SHIPPED | OUTPATIENT
Start: 2021-08-06 | End: 2021-09-01 | Stop reason: SDUPTHER

## 2021-08-06 RX ORDER — SULFAMETHOXAZOLE AND TRIMETHOPRIM 800; 160 MG/1; MG/1
1 TABLET ORAL 2 TIMES DAILY
Qty: 6 TABLET | Refills: 0 | Status: SHIPPED | OUTPATIENT
Start: 2021-08-06 | End: 2021-08-09

## 2021-08-06 RX ORDER — MONTELUKAST SODIUM 10 MG/1
10 TABLET ORAL NIGHTLY
Qty: 30 TABLET | Refills: 0 | Status: SHIPPED | OUTPATIENT
Start: 2021-08-06 | End: 2021-09-02

## 2021-08-06 RX ORDER — FLUTICASONE PROPIONATE 50 MCG
1 SPRAY, SUSPENSION (ML) NASAL DAILY
Qty: 16 G | Refills: 0 | Status: SHIPPED | OUTPATIENT
Start: 2021-08-06 | End: 2021-09-02

## 2021-08-15 ENCOUNTER — PATIENT MESSAGE (OUTPATIENT)
Dept: INTERNAL MEDICINE | Facility: CLINIC | Age: 36
End: 2021-08-15

## 2021-09-01 DIAGNOSIS — I10 ESSENTIAL HYPERTENSION: ICD-10-CM

## 2021-09-02 RX ORDER — BENAZEPRIL/HYDROCHLOROTHIAZIDE 20 MG-25MG
1 TABLET ORAL DAILY
Qty: 30 TABLET | Refills: 3 | Status: SHIPPED | OUTPATIENT
Start: 2021-09-02 | End: 2022-02-15 | Stop reason: SDUPTHER

## 2021-12-09 ENCOUNTER — PATIENT MESSAGE (OUTPATIENT)
Dept: INTERNAL MEDICINE | Facility: CLINIC | Age: 36
End: 2021-12-09

## 2021-12-09 ENCOUNTER — OFFICE VISIT (OUTPATIENT)
Dept: INTERNAL MEDICINE | Facility: CLINIC | Age: 36
End: 2021-12-09
Payer: COMMERCIAL

## 2021-12-09 DIAGNOSIS — J45.21 MILD INTERMITTENT ASTHMA WITH ACUTE EXACERBATION: Primary | ICD-10-CM

## 2021-12-09 DIAGNOSIS — J32.9 SINUSITIS, UNSPECIFIED CHRONICITY, UNSPECIFIED LOCATION: ICD-10-CM

## 2021-12-09 PROCEDURE — 4010F ACE/ARB THERAPY RXD/TAKEN: CPT | Mod: CPTII,95,, | Performed by: NURSE PRACTITIONER

## 2021-12-09 PROCEDURE — 99213 PR OFFICE/OUTPT VISIT, EST, LEVL III, 20-29 MIN: ICD-10-PCS | Mod: 95,,, | Performed by: NURSE PRACTITIONER

## 2021-12-09 PROCEDURE — 99213 OFFICE O/P EST LOW 20 MIN: CPT | Mod: 95,,, | Performed by: NURSE PRACTITIONER

## 2021-12-09 PROCEDURE — 4010F PR ACE/ARB THEARPY RXD/TAKEN: ICD-10-PCS | Mod: CPTII,95,, | Performed by: NURSE PRACTITIONER

## 2021-12-09 RX ORDER — PREDNISONE 20 MG/1
20 TABLET ORAL DAILY
Qty: 5 TABLET | Refills: 0 | Status: SHIPPED | OUTPATIENT
Start: 2021-12-09 | End: 2022-02-08

## 2021-12-09 RX ORDER — PROMETHAZINE HYDROCHLORIDE AND DEXTROMETHORPHAN HYDROBROMIDE 6.25; 15 MG/5ML; MG/5ML
5 SYRUP ORAL
Qty: 118 ML | Refills: 0 | Status: SHIPPED | OUTPATIENT
Start: 2021-12-09 | End: 2021-12-19

## 2021-12-09 RX ORDER — ALBUTEROL SULFATE 0.83 MG/ML
2.5 SOLUTION RESPIRATORY (INHALATION)
Status: CANCELLED | OUTPATIENT
Start: 2021-12-09 | End: 2021-12-09

## 2021-12-09 RX ORDER — ALBUTEROL SULFATE 0.83 MG/ML
2.5 SOLUTION RESPIRATORY (INHALATION) EVERY 6 HOURS PRN
Qty: 1 EACH | Refills: 5 | Status: SHIPPED | OUTPATIENT
Start: 2021-12-09

## 2021-12-28 ENCOUNTER — PATIENT MESSAGE (OUTPATIENT)
Dept: INTERNAL MEDICINE | Facility: CLINIC | Age: 36
End: 2021-12-28
Payer: COMMERCIAL

## 2021-12-29 ENCOUNTER — TELEPHONE (OUTPATIENT)
Dept: INTERNAL MEDICINE | Facility: CLINIC | Age: 36
End: 2021-12-29
Payer: COMMERCIAL

## 2021-12-29 NOTE — TELEPHONE ENCOUNTER
I called the pt and was able to get her set up with  in pulmonary to  her nebulizer. She verbalized understanding. //kah

## 2022-01-10 ENCOUNTER — TELEPHONE (OUTPATIENT)
Dept: SURGERY | Facility: CLINIC | Age: 37
End: 2022-01-10
Payer: COMMERCIAL

## 2022-01-10 NOTE — TELEPHONE ENCOUNTER
Spoke to and scheduled pt's appt with Dr Roche for Tuesday 1/18 [facility] @ 1000 - Pt correctly repeated back all appt information    ----- Message from Irina Fung sent at 1/10/2022 10:21 AM CST -----  Regarding: appt  Contact: patient  Patient requesting to be referred to another provider for her procedure, please call her back at [phone]

## 2022-01-13 ENCOUNTER — PATIENT OUTREACH (OUTPATIENT)
Dept: ADMINISTRATIVE | Facility: OTHER | Age: 37
End: 2022-01-13
Payer: COMMERCIAL

## 2022-01-13 NOTE — PROGRESS NOTES
Health Maintenance Due   Topic Date Due    Pneumococcal Vaccines (Age 0-64) (1 of 2 - PPSV23) Never done    TETANUS VACCINE  Never done    Influenza Vaccine (1) Never done    Cervical Cancer Screening  11/14/2021     Updates were requested from care everywhere.  Chart was reviewed for overdue Proactive [facility] Encounters ([facility]) topics (CRS, Breast Cancer Screening, Eye exam)  Health Maintenance has been updated.  LINKS immunization registry triggered.  Immunizations were reconciled.

## 2022-01-18 ENCOUNTER — OFFICE VISIT (OUTPATIENT)
Dept: FAMILY MEDICINE | Facility: CLINIC | Age: 37
End: 2022-01-18
Payer: COMMERCIAL

## 2022-01-18 DIAGNOSIS — J06.9 UPPER RESPIRATORY TRACT INFECTION, UNSPECIFIED TYPE: Primary | ICD-10-CM

## 2022-01-18 PROCEDURE — 1160F PR REVIEW ALL MEDS BY PRESCRIBER/CLIN PHARMACIST DOCUMENTED: ICD-10-PCS | Mod: CPTII,95,, | Performed by: FAMILY MEDICINE

## 2022-01-18 PROCEDURE — 1159F PR MEDICATION LIST DOCUMENTED IN MEDICAL RECORD: ICD-10-PCS | Mod: CPTII,95,, | Performed by: FAMILY MEDICINE

## 2022-01-18 PROCEDURE — 1159F MED LIST DOCD IN RCRD: CPT | Mod: CPTII,95,, | Performed by: FAMILY MEDICINE

## 2022-01-18 PROCEDURE — 1160F RVW MEDS BY RX/DR IN RCRD: CPT | Mod: CPTII,95,, | Performed by: FAMILY MEDICINE

## 2022-01-18 PROCEDURE — 99213 PR OFFICE/OUTPT VISIT, EST, LEVL III, 20-29 MIN: ICD-10-PCS | Mod: 95,,, | Performed by: FAMILY MEDICINE

## 2022-01-18 PROCEDURE — 99213 OFFICE O/P EST LOW 20 MIN: CPT | Mod: 95,,, | Performed by: FAMILY MEDICINE

## 2022-01-18 NOTE — PROGRESS NOTES
Raiza Mcpherson presents with moderate upper respiratory congestion,rhinnorhea,moderate cough past 4 days. Hx asthma.Denies nausea,vomiting,diarrhea or significant fever.    Past Medical History:   Diagnosis Date    Asthma     childhood    Hypertension      Past Surgical History:   Procedure Laterality Date    EXCISION OF MASS OF ABDOMEN N/A 11/27/2019    Procedure: EXCISION, MASS, ABDOMEN;  Surgeon: Angie Pagan MD;  Location: [facility] OR;  Service: General;  Laterality: N/A;  XI ROBOTIC      ROBOT-ASSISTED CHOLECYSTECTOMY USING DA PHIL XI N/A 11/25/2020    Procedure: XI ROBOTIC CHOLECYSTECTOMY;  Surgeon: Angie Pagan MD;  Location: [facility] OR;  Service: General;  Laterality: N/A;    ROBOT-ASSISTED REPAIR OF VENTRAL HERNIA USING DA PHIL XI N/A 11/27/2019    Procedure: XI ROBOTIC REPAIR, HERNIA, VENTRAL;  Surgeon: Angie Pagan MD;  Location: [facility] OR;  Service: General;  Laterality: N/A;     Review of patient's allergies indicates:  No Known Allergies  Current Outpatient Medications on File Prior to Visit   Medication Sig Dispense Refill    albuterol (PROVENTIL) 2.5 mg /3 mL (0.083 %) nebulizer solution Take 3 mLs (2.5 mg total) by nebulization every 6 (six) hours as needed for Wheezing or Shortness of Breath. Rescue 1 each 5    benazepril-hydrochlorthiazide (LOTENSIN HCT) 20-25 mg Tab Take 1 tablet by mouth once daily. 30 tablet 3    CRYSELLE, 28, 0.3-30 mg-mcg per tablet TAKE 1 TABLET BY MOUTH EVERY DAY 28 tablet 5    fluticasone propionate (FLONASE) 50 mcg/actuation nasal spray SHAKE LIQUID AND USE 1 SPRAY(50 MCG) IN EACH NOSTRIL EVERY DAY 16 g 0    montelukast (SINGULAIR) 10 mg tablet TAKE 1 TABLET(10 MG) BY MOUTH EVERY EVENING 90 tablet 1    predniSONE (DELTASONE) 20 MG tablet Take 1 tablet (20 mg total) by mouth once daily. 5 tablet 0     No current facility-administered medications on file prior to visit.     Social History     Socioeconomic History    Marital status:  Single    Number of children: 3   Occupational History    Occupation: Crime victim asst     Employer: [employer]   Tobacco Use    Smoking status: Never Smoker    Smokeless tobacco: Never Used   Substance and Sexual Activity    Alcohol use: No    Drug use: No    Sexual activity: Not Currently     Social Determinants of Health     Financial Resource Strain: Medium Risk    Difficulty of Paying Living Expenses: Somewhat hard   Food Insecurity: No Food Insecurity    Worried About Running Out of Food in the Last Year: Never true    Ran Out of Food in the Last Year: Never true   Transportation Needs: Unknown    Lack of Transportation (Medical): Patient refused    Lack of Transportation (Non-Medical): Patient refused   Physical Activity: Insufficiently Active    Days of Exercise per Week: 3 days    Minutes of Exercise per Session: 30 min   Stress: No Stress Concern Present    Feeling of Stress : Not at all   Social Connections: Unknown    Frequency of Communication with Friends and Family: More than three times a week    Frequency of Social Gatherings with Friends and Family: Twice a week    Active Member of Clubs or Organizations: Yes    Attends Club or Organization Meetings: 1 to 4 times per year    Marital Status:    Housing Stability: Unknown    Unable to Pay for Housing in the Last Year: No    Unstable Housing in the Last Year: No     Family History   Problem Relation Age of Onset    Hypertension Father     Kidney disease Paternal Grandmother     Cancer Paternal Grandmother     Kidney disease Paternal Grandfather     Vision loss Paternal Grandfather     Hypertension Mother          ROS:  SKIN: No rashes, itching or changes in color or texture of skin.  EYES: Visual acuity fine. No photophobia, ocular pain or diplopia.EARS: Denies ear pain, discharge or vertigo.NOSE: No loss of smell, no epistaxis some postnasal drip.MOUTH & THROAT: No hoarseness or change in voice. No  excessive gum bleeding.CHEST: Denies GALO, cyanosis, wheezing  CARDIOVASCULAR: Denies chest pain, PND, orthopnea or reduced exercise tolerance.  ABDOMEN:  No weight loss.No abdominal pain, no hematemesis or blood in stool.  URINARY: No flank pain, dysuria or hematuria.  PERIPHERAL VASCULAR: No claudication or cyanosis.  MUSCULOSKELETAL: Negative   NEUROLOGIC: No history of seizures, paralysis, alteration of gait or coordination.    PE: Vital signs as noted  Heent:Normocephalic with no recent cranial trauma,PERRLA,EOMI,conjunctiva clear,fundi reveal no hemmorhage exudate or papilledema.Otic canals clear, tympanic membranes slightly dull bilaterally.Nasal mucosa slightly red and edematous.Posterior pharynx slightly red but without exudate.  Neck:Supple with minimal anterior cervical adenopathy.  Chest:Clear bilateral breath sounds with mild scattered ronchi  Heart:Regular rhthym without murmer  Abdomen:Soft, non tender,no masses, no hepatosplenomegalyExtremeties and Neurologic:Grossly within normal limits  Impression: Upper Respiratory Infection. 465.9  Plan: Covid risk 3  Rec test and if positive schedule MAB infusion  Answers for HPI/ROS submitted by the patient on 1/18/2022  Chronicity: new  Onset: in the past 7 days  Progression since onset: gradually improving  Frequency: every few hours  Cough characteristics: productive of sputum  chest pain: Yes  chills: Yes  ear congestion: Yes  ear pain: No  fever: Yes  headaches: Yes  heartburn: No  hemoptysis: No  myalgias: Yes  nasal congestion: Yes  postnasal drip: Yes  rash: No  rhinorrhea: Yes  shortness of breath: Yes  sore throat: Yes  sweats: Yes  weight loss: No  wheezing: No  Aggravated by: nothing  asthma: No  bronchiectasis: No  bronchitis: No  COPD: No  emphysema: No  environmental allergies: No  pneumonia: No  Treatments tried: OTC cough suppressant

## 2022-02-08 ENCOUNTER — OFFICE VISIT (OUTPATIENT)
Dept: INTERNAL MEDICINE | Facility: CLINIC | Age: 37
End: 2022-02-08
Payer: COMMERCIAL

## 2022-02-08 VITALS
BODY MASS INDEX: 39.09 KG/M2 | OXYGEN SATURATION: 98 % | HEART RATE: 79 BPM | DIASTOLIC BLOOD PRESSURE: 80 MMHG | TEMPERATURE: 98 F | WEIGHT: 227.75 LBS | SYSTOLIC BLOOD PRESSURE: 118 MMHG

## 2022-02-08 DIAGNOSIS — E66.01 SEVERE OBESITY (BMI 35.0-39.9) WITH COMORBIDITY: ICD-10-CM

## 2022-02-08 DIAGNOSIS — J45.20 MILD INTERMITTENT ASTHMA WITHOUT COMPLICATION: ICD-10-CM

## 2022-02-08 DIAGNOSIS — I10 ESSENTIAL HYPERTENSION: ICD-10-CM

## 2022-02-08 DIAGNOSIS — J30.1 SEASONAL ALLERGIC RHINITIS DUE TO POLLEN: ICD-10-CM

## 2022-02-08 DIAGNOSIS — J02.9 SORETHROAT: Primary | ICD-10-CM

## 2022-02-08 LAB
CTP QC/QA: YES
CTP QC/QA: YES
POC MOLECULAR INFLUENZA A AGN: NEGATIVE
POC MOLECULAR INFLUENZA B AGN: NEGATIVE
SARS-COV-2 RDRP RESP QL NAA+PROBE: NEGATIVE

## 2022-02-08 PROCEDURE — 1159F PR MEDICATION LIST DOCUMENTED IN MEDICAL RECORD: ICD-10-PCS | Mod: CPTII,S$GLB,, | Performed by: FAMILY MEDICINE

## 2022-02-08 PROCEDURE — U0002: ICD-10-PCS | Mod: QW,S$GLB,, | Performed by: FAMILY MEDICINE

## 2022-02-08 PROCEDURE — 4010F ACE/ARB THERAPY RXD/TAKEN: CPT | Mod: CPTII,S$GLB,, | Performed by: FAMILY MEDICINE

## 2022-02-08 PROCEDURE — U0002 COVID-19 LAB TEST NON-CDC: HCPCS | Mod: QW,S$GLB,, | Performed by: FAMILY MEDICINE

## 2022-02-08 PROCEDURE — 99214 PR OFFICE/OUTPT VISIT, EST, LEVL IV, 30-39 MIN: ICD-10-PCS | Mod: S$GLB,,, | Performed by: FAMILY MEDICINE

## 2022-02-08 PROCEDURE — 1160F PR REVIEW ALL MEDS BY PRESCRIBER/CLIN PHARMACIST DOCUMENTED: ICD-10-PCS | Mod: CPTII,S$GLB,, | Performed by: FAMILY MEDICINE

## 2022-02-08 PROCEDURE — 3074F PR MOST RECENT SYSTOLIC BLOOD PRESSURE < 130 MM HG: ICD-10-PCS | Mod: CPTII,S$GLB,, | Performed by: FAMILY MEDICINE

## 2022-02-08 PROCEDURE — 1159F MED LIST DOCD IN RCRD: CPT | Mod: CPTII,S$GLB,, | Performed by: FAMILY MEDICINE

## 2022-02-08 PROCEDURE — 99214 OFFICE O/P EST MOD 30 MIN: CPT | Mod: S$GLB,,, | Performed by: FAMILY MEDICINE

## 2022-02-08 PROCEDURE — 3079F PR MOST RECENT DIASTOLIC BLOOD PRESSURE 80-89 MM HG: ICD-10-PCS | Mod: CPTII,S$GLB,, | Performed by: FAMILY MEDICINE

## 2022-02-08 PROCEDURE — 87502 POCT INFLUENZA A/B MOLECULAR: ICD-10-PCS | Mod: QW,S$GLB,, | Performed by: FAMILY MEDICINE

## 2022-02-08 PROCEDURE — 3074F SYST BP LT 130 MM HG: CPT | Mod: CPTII,S$GLB,, | Performed by: FAMILY MEDICINE

## 2022-02-08 PROCEDURE — 3079F DIAST BP 80-89 MM HG: CPT | Mod: CPTII,S$GLB,, | Performed by: FAMILY MEDICINE

## 2022-02-08 PROCEDURE — 4010F PR ACE/ARB THEARPY RXD/TAKEN: ICD-10-PCS | Mod: CPTII,S$GLB,, | Performed by: FAMILY MEDICINE

## 2022-02-08 PROCEDURE — 3008F PR BODY MASS INDEX (BMI) DOCUMENTED: ICD-10-PCS | Mod: CPTII,S$GLB,, | Performed by: FAMILY MEDICINE

## 2022-02-08 PROCEDURE — 99999 PR PBB SHADOW E&M-EST. PATIENT-LVL III: CPT | Mod: PBBFAC,,, | Performed by: FAMILY MEDICINE

## 2022-02-08 PROCEDURE — 99999 PR PBB SHADOW E&M-EST. PATIENT-LVL III: ICD-10-PCS | Mod: PBBFAC,,, | Performed by: FAMILY MEDICINE

## 2022-02-08 PROCEDURE — 3008F BODY MASS INDEX DOCD: CPT | Mod: CPTII,S$GLB,, | Performed by: FAMILY MEDICINE

## 2022-02-08 PROCEDURE — 87502 INFLUENZA DNA AMP PROBE: CPT | Mod: QW,S$GLB,, | Performed by: FAMILY MEDICINE

## 2022-02-08 PROCEDURE — 1160F RVW MEDS BY RX/DR IN RCRD: CPT | Mod: CPTII,S$GLB,, | Performed by: FAMILY MEDICINE

## 2022-02-08 RX ORDER — AMOXICILLIN 875 MG/1
875 TABLET, FILM COATED ORAL 2 TIMES DAILY
Qty: 14 TABLET | Refills: 0 | Status: SHIPPED | OUTPATIENT
Start: 2022-02-08 | End: 2022-02-15

## 2022-02-08 NOTE — PROGRESS NOTES
Subjective:       Patient ID: Raiza Mcpherson is a 36 y.o. female.    Chief Complaint: Sore Throat and Nasal Congestion    36-year-old female patient with Patient Active Problem List:     Severe obesity (BMI 35.0-39.9) with comorbidity     Essential hypertension     Mild intermittent asthma without complication     Seasonal allergic rhinitis due to pollen     Eczema  Here with complaint of sore throat and nasal congestion with runny nose and dry cough for the past 4-5 days, reports that she was sick a month ago and thought it was COVID but has not been checked out at that time.  Reports occasional wheezing but denies any chest discomfort.  Blood pressure has been stable.       Review of Systems   Constitutional: Negative for chills, fatigue and fever.   HENT: Positive for congestion, postnasal drip, rhinorrhea and sore throat.    Eyes: Negative for visual disturbance.   Respiratory: Positive for cough and wheezing. Negative for shortness of breath.    Cardiovascular: Negative for chest pain and leg swelling.   Gastrointestinal: Negative for abdominal pain, nausea and vomiting.   Musculoskeletal: Negative for myalgias.   Skin: Negative for rash.   Neurological: Negative for light-headedness and headaches.   Psychiatric/Behavioral: Negative for sleep disturbance.         /80 (BP Location: Right arm, Patient Position: Sitting, BP Method: Large (Manual))   Pulse 79   Temp 98.2 °F (36.8 °C) (Tympanic)   Wt 103.3 kg (227 lb 11.8 oz)   LMP 02/06/2022   SpO2 98%   BMI 39.09 kg/m²   Objective:      Physical Exam  Constitutional:       Appearance: She is well-developed and well-nourished.   HENT:      Head: Normocephalic and atraumatic.      Nose: Rhinorrhea present.      Mouth/Throat:      Mouth: Mucous membranes are moist.      Pharynx: Posterior oropharyngeal erythema present. No oropharyngeal exudate.   Cardiovascular:      Rate and Rhythm: Normal rate and regular rhythm.      Heart sounds: Normal  heart sounds. No murmur heard.      Pulmonary:      Effort: Pulmonary effort is normal. No respiratory distress.      Breath sounds: Normal breath sounds. No wheezing.   Abdominal:      General: Bowel sounds are normal.      Palpations: Abdomen is soft.      Tenderness: There is no abdominal tenderness.   Musculoskeletal:         General: No edema.   Lymphadenopathy:      Cervical: No cervical adenopathy.   Skin:     General: Skin is warm and dry.      Findings: No rash.   Neurological:      Mental Status: She is alert and oriented to person, place, and time.   Psychiatric:         Mood and Affect: Mood and affect and mood normal.           Assessment/Plan:   1. Sorethroat  - POCT COVID-19 Rapid Screening  - POCT Influenza A/B Molecular  2. Seasonal allergic rhinitis due to pollen  - POCT COVID-19 Rapid Screening  - POCT Influenza A/B Molecular    Will check for COVID and flu, if negative will consider antibiotics amoxicillin for sore throat  Patient was advised to do salt water gargles and take Tylenol as needed for pain  Encouraged to continue Zyrtec in the morning    3. Mild intermittent asthma without complication  Advised to use albuterol inhaler as needed for wheezing and Singulair in the evening    4. Essential hypertension  Blood pressure is stable today currently on benazepril hydrochlorothiazide 20/25 mg    5. Severe obesity (BMI 35.0-39.9) with comorbidity   Strict lifestyle changes recommended with diet and exercise to lose weight with BMI 39

## 2022-02-15 ENCOUNTER — LAB VISIT (OUTPATIENT)
Dept: LAB | Facility: HOSPITAL | Age: 37
End: 2022-02-15
Attending: SURGERY
Payer: COMMERCIAL

## 2022-02-15 ENCOUNTER — OFFICE VISIT (OUTPATIENT)
Dept: INTERNAL MEDICINE | Facility: CLINIC | Age: 37
End: 2022-02-15
Payer: COMMERCIAL

## 2022-02-15 VITALS
HEART RATE: 91 BPM | BODY MASS INDEX: 39.54 KG/M2 | SYSTOLIC BLOOD PRESSURE: 112 MMHG | OXYGEN SATURATION: 98 % | TEMPERATURE: 99 F | WEIGHT: 230.38 LBS | DIASTOLIC BLOOD PRESSURE: 80 MMHG

## 2022-02-15 DIAGNOSIS — J45.20 MILD INTERMITTENT ASTHMA WITHOUT COMPLICATION: ICD-10-CM

## 2022-02-15 DIAGNOSIS — Z00.00 ROUTINE GENERAL MEDICAL EXAMINATION AT A HEALTH CARE FACILITY: ICD-10-CM

## 2022-02-15 DIAGNOSIS — Z01.818 PRE-OP TESTING: ICD-10-CM

## 2022-02-15 DIAGNOSIS — E66.01 SEVERE OBESITY (BMI 35.0-39.9) WITH COMORBIDITY: ICD-10-CM

## 2022-02-15 DIAGNOSIS — Z00.00 ROUTINE GENERAL MEDICAL EXAMINATION AT A HEALTH CARE FACILITY: Primary | ICD-10-CM

## 2022-02-15 DIAGNOSIS — I10 ESSENTIAL HYPERTENSION: ICD-10-CM

## 2022-02-15 DIAGNOSIS — J30.1 SEASONAL ALLERGIC RHINITIS DUE TO POLLEN: ICD-10-CM

## 2022-02-15 LAB
ALBUMIN SERPL BCP-MCNC: 3.5 G/DL (ref 3.5–5.2)
ALP SERPL-CCNC: 50 U/L (ref 55–135)
ALT SERPL W/O P-5'-P-CCNC: 12 U/L (ref 10–44)
ANION GAP SERPL CALC-SCNC: 9 MMOL/L (ref 8–16)
AST SERPL-CCNC: 14 U/L (ref 10–40)
BILIRUB SERPL-MCNC: 0.2 MG/DL (ref 0.1–1)
BUN SERPL-MCNC: 10 MG/DL (ref 6–20)
CALCIUM SERPL-MCNC: 9.3 MG/DL (ref 8.7–10.5)
CHLORIDE SERPL-SCNC: 102 MMOL/L (ref 95–110)
CO2 SERPL-SCNC: 28 MMOL/L (ref 23–29)
CREAT SERPL-MCNC: 1 MG/DL (ref 0.5–1.4)
EST. GFR  (AFRICAN AMERICAN): >60 ML/MIN/1.73 M^2
EST. GFR  (NON AFRICAN AMERICAN): >60 ML/MIN/1.73 M^2
GLUCOSE SERPL-MCNC: 106 MG/DL (ref 70–110)
POTASSIUM SERPL-SCNC: 3.6 MMOL/L (ref 3.5–5.1)
PROT SERPL-MCNC: 7 G/DL (ref 6–8.4)
SODIUM SERPL-SCNC: 139 MMOL/L (ref 136–145)
TSH SERPL DL<=0.005 MIU/L-ACNC: 0.69 UIU/ML (ref 0.4–4)

## 2022-02-15 PROCEDURE — 3079F DIAST BP 80-89 MM HG: CPT | Mod: CPTII,S$GLB,, | Performed by: FAMILY MEDICINE

## 2022-02-15 PROCEDURE — 1160F PR REVIEW ALL MEDS BY PRESCRIBER/CLIN PHARMACIST DOCUMENTED: ICD-10-PCS | Mod: CPTII,S$GLB,, | Performed by: FAMILY MEDICINE

## 2022-02-15 PROCEDURE — 36415 COLL VENOUS BLD VENIPUNCTURE: CPT | Performed by: FAMILY MEDICINE

## 2022-02-15 PROCEDURE — 3008F BODY MASS INDEX DOCD: CPT | Mod: CPTII,S$GLB,, | Performed by: FAMILY MEDICINE

## 2022-02-15 PROCEDURE — 84443 ASSAY THYROID STIM HORMONE: CPT | Performed by: FAMILY MEDICINE

## 2022-02-15 PROCEDURE — 3079F PR MOST RECENT DIASTOLIC BLOOD PRESSURE 80-89 MM HG: ICD-10-PCS | Mod: CPTII,S$GLB,, | Performed by: FAMILY MEDICINE

## 2022-02-15 PROCEDURE — 80053 COMPREHEN METABOLIC PANEL: CPT | Performed by: FAMILY MEDICINE

## 2022-02-15 PROCEDURE — 1159F MED LIST DOCD IN RCRD: CPT | Mod: CPTII,S$GLB,, | Performed by: FAMILY MEDICINE

## 2022-02-15 PROCEDURE — 85025 COMPLETE CBC W/AUTO DIFF WBC: CPT | Performed by: SURGERY

## 2022-02-15 PROCEDURE — 1159F PR MEDICATION LIST DOCUMENTED IN MEDICAL RECORD: ICD-10-PCS | Mod: CPTII,S$GLB,, | Performed by: FAMILY MEDICINE

## 2022-02-15 PROCEDURE — 3074F SYST BP LT 130 MM HG: CPT | Mod: CPTII,S$GLB,, | Performed by: FAMILY MEDICINE

## 2022-02-15 PROCEDURE — 3008F PR BODY MASS INDEX (BMI) DOCUMENTED: ICD-10-PCS | Mod: CPTII,S$GLB,, | Performed by: FAMILY MEDICINE

## 2022-02-15 PROCEDURE — 99999 PR PBB SHADOW E&M-EST. PATIENT-LVL IV: CPT | Mod: PBBFAC,,, | Performed by: FAMILY MEDICINE

## 2022-02-15 PROCEDURE — 99999 PR PBB SHADOW E&M-EST. PATIENT-LVL IV: ICD-10-PCS | Mod: PBBFAC,,, | Performed by: FAMILY MEDICINE

## 2022-02-15 PROCEDURE — 4010F PR ACE/ARB THEARPY RXD/TAKEN: ICD-10-PCS | Mod: CPTII,S$GLB,, | Performed by: FAMILY MEDICINE

## 2022-02-15 PROCEDURE — 99395 PR PREVENTIVE VISIT,EST,18-39: ICD-10-PCS | Mod: S$GLB,,, | Performed by: FAMILY MEDICINE

## 2022-02-15 PROCEDURE — 1160F RVW MEDS BY RX/DR IN RCRD: CPT | Mod: CPTII,S$GLB,, | Performed by: FAMILY MEDICINE

## 2022-02-15 PROCEDURE — 4010F ACE/ARB THERAPY RXD/TAKEN: CPT | Mod: CPTII,S$GLB,, | Performed by: FAMILY MEDICINE

## 2022-02-15 PROCEDURE — 99395 PREV VISIT EST AGE 18-39: CPT | Mod: S$GLB,,, | Performed by: FAMILY MEDICINE

## 2022-02-15 PROCEDURE — 3074F PR MOST RECENT SYSTOLIC BLOOD PRESSURE < 130 MM HG: ICD-10-PCS | Mod: CPTII,S$GLB,, | Performed by: FAMILY MEDICINE

## 2022-02-15 RX ORDER — BENAZEPRIL/HYDROCHLOROTHIAZIDE 20 MG-25MG
1 TABLET ORAL DAILY
Qty: 90 TABLET | Refills: 3 | Status: SHIPPED | OUTPATIENT
Start: 2022-02-15 | End: 2022-08-12

## 2022-02-15 NOTE — PROGRESS NOTES
Subjective:       Patient ID: Raiza Mcpherson is a 36 y.o. female.    Chief Complaint: Annual Exam     36-year-old  female patient with Patient Active Problem List:     Severe obesity (BMI 35.0-39.9) with comorbidity     Essential hypertension     Mild intermittent asthma without complication     Seasonal allergic rhinitis due to pollen     Eczema  Here for routine annual physicals and reports that patient has been taking her blood pressure medication regularly.  Allergies and asthma has been stable.  Recently finished antibiotics amoxicillin for sore throat and has been feeling better.  Denies any significant complaints today.  Reports that patient is up-to-date with Pap smear done last year at [facility].   Patient is up-to-date with vaccinations at work    Review of Systems   Constitutional: Positive for unexpected weight change. Negative for activity change and fatigue.   HENT: Positive for rhinorrhea. Negative for hearing loss and trouble swallowing.    Eyes: Negative for discharge and visual disturbance.   Respiratory: Negative for chest tightness, shortness of breath and wheezing.    Cardiovascular: Negative for chest pain, palpitations and leg swelling.   Gastrointestinal: Negative for abdominal pain, blood in stool, constipation, diarrhea, nausea and vomiting.   Endocrine: Negative for polydipsia and polyuria.   Genitourinary: Negative for difficulty urinating, dysuria, hematuria and menstrual problem.   Musculoskeletal: Negative for arthralgias, joint swelling, myalgias and neck pain.   Skin: Negative for rash.   Neurological: Negative for weakness, light-headedness and headaches.   Psychiatric/Behavioral: Negative for confusion, dysphoric mood and sleep disturbance.         /80 (BP Location: Right arm, Patient Position: Sitting, BP Method: Large (Manual))   Pulse 91   Temp 99 °F (37.2 °C) (Tympanic)   Wt 104.5 kg (230 lb 6.1 oz)   LMP 02/06/2022   SpO2 98%   BMI 39.54  kg/m²   Objective:      Physical Exam  Constitutional:       Appearance: She is well-developed and well-nourished.   HENT:      Head: Normocephalic and atraumatic.      Right Ear: Tympanic membrane, ear canal and external ear normal.      Left Ear: Tympanic membrane, ear canal and external ear normal.      Mouth/Throat:      Mouth: Oropharynx is clear and moist. Mucous membranes are moist.   Cardiovascular:      Rate and Rhythm: Normal rate and regular rhythm.      Heart sounds: Normal heart sounds. No murmur heard.      Pulmonary:      Effort: Pulmonary effort is normal. No respiratory distress.      Breath sounds: Normal breath sounds. No wheezing.   Abdominal:      General: Bowel sounds are normal.      Palpations: Abdomen is soft.      Tenderness: There is no abdominal tenderness.   Musculoskeletal:         General: No edema.   Skin:     General: Skin is warm and dry.      Findings: No rash.   Neurological:      Mental Status: She is alert and oriented to person, place, and time.   Psychiatric:         Mood and Affect: Mood and affect and mood normal.           Assessment/Plan:   1. Routine general medical examination at a health care facility  - CBC Auto Differential; Future  - Comprehensive Metabolic Panel; Future  - TSH; Future  - Urinalysis, Reflex to Urine Culture Urine, Clean Catch; Future  Vital signs stable today.  Clinical exam stable  Continue lifestyle modifications with low-fat and low-cholesterol diet and exercise 30 minutes daily to lose weight with BMI 39      2. Essential hypertension  - Comprehensive Metabolic Panel; Future  - TSH; Future  - Urinalysis, Reflex to Urine Culture Urine, Clean Catch; Future  - benazepril-hydrochlorthiazide (LOTENSIN HCT) 20-25 mg Tab; Take 1 tablet by mouth once daily.  Dispense: 90 tablet; Refill: 3  Blood pressure is stable today currently on benazepril hydrochlorothiazide 20/25 mg daily    3. Mild intermittent asthma without complication  Using albuterol inhaler  as needed    4. Seasonal allergic rhinitis due to pollen   Stable on Flonase and Singulair as needed

## 2022-02-16 LAB
BASOPHILS # BLD AUTO: 0.02 K/UL (ref 0–0.2)
BASOPHILS # BLD AUTO: 0.02 K/UL (ref 0–0.2)
BASOPHILS NFR BLD: 0.4 % (ref 0–1.9)
BASOPHILS NFR BLD: 0.4 % (ref 0–1.9)
DIFFERENTIAL METHOD: ABNORMAL
DIFFERENTIAL METHOD: ABNORMAL
EOSINOPHIL # BLD AUTO: 0.2 K/UL (ref 0–0.5)
EOSINOPHIL # BLD AUTO: 0.2 K/UL (ref 0–0.5)
EOSINOPHIL NFR BLD: 4 % (ref 0–8)
EOSINOPHIL NFR BLD: 4 % (ref 0–8)
ERYTHROCYTE [DISTWIDTH] IN BLOOD BY AUTOMATED COUNT: 13.5 % (ref 11.5–14.5)
ERYTHROCYTE [DISTWIDTH] IN BLOOD BY AUTOMATED COUNT: 13.5 % (ref 11.5–14.5)
HCT VFR BLD AUTO: 38.7 % (ref 37–48.5)
HCT VFR BLD AUTO: 38.7 % (ref 37–48.5)
HGB BLD-MCNC: 11.8 G/DL (ref 12–16)
HGB BLD-MCNC: 11.8 G/DL (ref 12–16)
IMM GRANULOCYTES # BLD AUTO: 0.01 K/UL (ref 0–0.04)
IMM GRANULOCYTES # BLD AUTO: 0.01 K/UL (ref 0–0.04)
IMM GRANULOCYTES NFR BLD AUTO: 0.2 % (ref 0–0.5)
IMM GRANULOCYTES NFR BLD AUTO: 0.2 % (ref 0–0.5)
LYMPHOCYTES # BLD AUTO: 2.2 K/UL (ref 1–4.8)
LYMPHOCYTES # BLD AUTO: 2.2 K/UL (ref 1–4.8)
LYMPHOCYTES NFR BLD: 40.6 % (ref 18–48)
LYMPHOCYTES NFR BLD: 40.6 % (ref 18–48)
MCH RBC QN AUTO: 27.3 PG (ref 27–31)
MCH RBC QN AUTO: 27.3 PG (ref 27–31)
MCHC RBC AUTO-ENTMCNC: 30.5 G/DL (ref 32–36)
MCHC RBC AUTO-ENTMCNC: 30.5 G/DL (ref 32–36)
MCV RBC AUTO: 90 FL (ref 82–98)
MCV RBC AUTO: 90 FL (ref 82–98)
MONOCYTES # BLD AUTO: 0.4 K/UL (ref 0.3–1)
MONOCYTES # BLD AUTO: 0.4 K/UL (ref 0.3–1)
MONOCYTES NFR BLD: 6.6 % (ref 4–15)
MONOCYTES NFR BLD: 6.6 % (ref 4–15)
NEUTROPHILS # BLD AUTO: 2.6 K/UL (ref 1.8–7.7)
NEUTROPHILS # BLD AUTO: 2.6 K/UL (ref 1.8–7.7)
NEUTROPHILS NFR BLD: 48.2 % (ref 38–73)
NEUTROPHILS NFR BLD: 48.2 % (ref 38–73)
NRBC BLD-RTO: 0 /100 WBC
NRBC BLD-RTO: 0 /100 WBC
PLATELET # BLD AUTO: 258 K/UL (ref 150–450)
PLATELET # BLD AUTO: 258 K/UL (ref 150–450)
PMV BLD AUTO: 12.3 FL (ref 9.2–12.9)
PMV BLD AUTO: 12.3 FL (ref 9.2–12.9)
RBC # BLD AUTO: 4.32 M/UL (ref 4–5.4)
RBC # BLD AUTO: 4.32 M/UL (ref 4–5.4)
WBC # BLD AUTO: 5.47 K/UL (ref 3.9–12.7)
WBC # BLD AUTO: 5.47 K/UL (ref 3.9–12.7)

## 2022-03-10 ENCOUNTER — PATIENT OUTREACH (OUTPATIENT)
Dept: ADMINISTRATIVE | Facility: HOSPITAL | Age: 37
End: 2022-03-10
Payer: COMMERCIAL

## 2022-03-10 NOTE — PROGRESS NOTES
BR PAP REPORT: Chart was reviewed for overdue pap smear. Remind me set to pull results from [facility]. Care team Updated.

## 2022-03-16 ENCOUNTER — OFFICE VISIT (OUTPATIENT)
Dept: FAMILY MEDICINE | Facility: CLINIC | Age: 37
End: 2022-03-16
Payer: COMMERCIAL

## 2022-03-16 DIAGNOSIS — R05.9 COUGH: ICD-10-CM

## 2022-03-16 DIAGNOSIS — J45.909 STABLE ASTHMA, UNSPECIFIED ASTHMA SEVERITY, UNSPECIFIED WHETHER PERSISTENT: ICD-10-CM

## 2022-03-16 DIAGNOSIS — J30.1 SEASONAL ALLERGIC RHINITIS DUE TO POLLEN: Primary | ICD-10-CM

## 2022-03-16 PROCEDURE — 99213 OFFICE O/P EST LOW 20 MIN: CPT | Mod: 95,,, | Performed by: REGISTERED NURSE

## 2022-03-16 PROCEDURE — 4010F PR ACE/ARB THEARPY RXD/TAKEN: ICD-10-PCS | Mod: CPTII,95,, | Performed by: REGISTERED NURSE

## 2022-03-16 PROCEDURE — 4010F ACE/ARB THERAPY RXD/TAKEN: CPT | Mod: CPTII,95,, | Performed by: REGISTERED NURSE

## 2022-03-16 PROCEDURE — 99213 PR OFFICE/OUTPT VISIT, EST, LEVL III, 20-29 MIN: ICD-10-PCS | Mod: 95,,, | Performed by: REGISTERED NURSE

## 2022-03-16 RX ORDER — BENZONATATE 100 MG/1
100-200 CAPSULE ORAL 3 TIMES DAILY PRN
Qty: 60 CAPSULE | Refills: 0 | Status: SHIPPED | OUTPATIENT
Start: 2022-03-16 | End: 2023-01-09

## 2022-03-16 RX ORDER — PROMETHAZINE HYDROCHLORIDE AND DEXTROMETHORPHAN HYDROBROMIDE 6.25; 15 MG/5ML; MG/5ML
5 SYRUP ORAL NIGHTLY PRN
Qty: 50 ML | Refills: 0 | Status: SHIPPED | OUTPATIENT
Start: 2022-03-16 | End: 2022-10-11 | Stop reason: SDUPTHER

## 2022-03-16 RX ORDER — METHYLPREDNISOLONE 4 MG/1
TABLET ORAL
Qty: 1 EACH | Refills: 0 | Status: SHIPPED | OUTPATIENT
Start: 2022-03-16 | End: 2023-01-09

## 2022-03-16 NOTE — PROGRESS NOTES
PRIMARY CARE TELEMEDICINE NOTE      Patient location:  [place]  Visit type: Virtual visit with synchronous audio and video  Each patient to whom he or she provides medical services by telemedicine is:  (1) informed of the relationship between the physician and patient and the respective role of any other health care provider with respect to management of the patient  (2) notified that he or she may decline to receive medical services by telemedicine and may withdraw from such care at any time        SUBJECTIVE     Patient ID: Raiza Mcpherson is a 37 y.o. female.      CHIEF COMPLAINT:    Raiza is seen virtually today for allergy/sinus issues.  Acute onset yesterday, no covid concerns.  Does have chronic seasonal allergy issues and asthma.  Reports no problems w/ asthma at this time.  Reports ears stopped up, NC and mild dry cough.  Taking Singulair nightly as ordered with Xyzal in AM.        Review of Systems   Constitutional: Negative for activity change, chills, diaphoresis, fever and unexpected weight change.   HENT: Positive for congestion, ear pain, rhinorrhea, sneezing and sore throat. Negative for hearing loss, postnasal drip, sinus pain, tinnitus and trouble swallowing.    Eyes: Negative.  Negative for discharge and visual disturbance.   Respiratory: Positive for cough. Negative for chest tightness, shortness of breath and wheezing.    Cardiovascular: Negative for chest pain and palpitations.   Gastrointestinal: Positive for diarrhea. Negative for blood in stool, constipation and vomiting.   Endocrine: Negative for polydipsia and polyuria.   Genitourinary: Negative for difficulty urinating, dysuria, hematuria and menstrual problem.   Musculoskeletal: Negative for arthralgias, joint swelling and neck pain.   Allergic/Immunologic: Positive for environmental allergies.   Neurological: Positive for headaches. Negative for tremors, syncope, weakness and light-headedness.   Psychiatric/Behavioral: Negative  for confusion and dysphoric mood.          Review of patient's allergies indicates:  No Known Allergies      Patient Active Problem List    Diagnosis Date Noted    Mild intermittent asthma without complication 08/06/2021    Seasonal allergic rhinitis due to pollen 08/06/2021    Eczema 08/06/2021    Essential hypertension 04/01/2020    Severe obesity (BMI 35.0-39.9) with comorbidity 11/24/2016           Current Outpatient Medications:     albuterol (PROVENTIL) 2.5 mg /3 mL (0.083 %) nebulizer solution, Take 3 mLs (2.5 mg total) by nebulization every 6 (six) hours as needed for Wheezing or Shortness of Breath. Rescue, Disp: 1 each, Rfl: 5    benazepril-hydrochlorthiazide (LOTENSIN HCT) 20-25 mg Tab, Take 1 tablet by mouth once daily., Disp: 90 tablet, Rfl: 3    CRYSELLE, 28, 0.3-30 mg-mcg per tablet, TAKE 1 TABLET BY MOUTH EVERY DAY, Disp: 28 tablet, Rfl: 5    fluticasone propionate (FLONASE) 50 mcg/actuation nasal spray, SHAKE LIQUID AND USE 1 SPRAY(50 MCG) IN EACH NOSTRIL EVERY DAY, Disp: 16 g, Rfl: 0    montelukast (SINGULAIR) 10 mg tablet, TAKE 1 TABLET(10 MG) BY MOUTH EVERY EVENING, Disp: 90 tablet, Rfl: 1      Past Medical History:   Diagnosis Date    Asthma     childhood    Hypertension          Past Surgical History:   Procedure Laterality Date    EXCISION OF MASS OF ABDOMEN N/A 11/27/2019    Procedure: EXCISION, MASS, ABDOMEN;  Surgeon: Angie Pagan MD;  Location: [facility] OR;  Service: General;  Laterality: N/A;  XI ROBOTIC      ROBOT-ASSISTED CHOLECYSTECTOMY USING DA PHIL XI N/A 11/25/2020    Procedure: XI ROBOTIC CHOLECYSTECTOMY;  Surgeon: Angie Pagan MD;  Location: [facility] OR;  Service: General;  Laterality: N/A;    ROBOT-ASSISTED REPAIR OF VENTRAL HERNIA USING DA PHIL XI N/A 11/27/2019    Procedure: XI ROBOTIC REPAIR, HERNIA, VENTRAL;  Surgeon: Angie Pagan MD;  Location: [facility] OR;  Service: General;  Laterality: N/A;         Family History   Problem Relation Age of Onset     Hypertension Father     Kidney disease Paternal Grandmother     Cancer Paternal Grandmother     Kidney disease Paternal Grandfather     Vision loss Paternal Grandfather     Hypertension Mother          Social History     Tobacco Use    Smoking status: Never Smoker    Smokeless tobacco: Never Used   Substance Use Topics    Alcohol use: No    Drug use: No             OBJECTIVE:  Physical Exam:  Constitutional:  In NAD, pleasant, cooperative.  Appears well-developed and well-nourished.   Respiratory:  Unlabored, in no resp distress.  Neurological: Alert and oriented to person, place, and time.   Psychiatric: Normal mood and affect, behavior is normal. Judgment and thought content normal.       Complete PE deferred due to video visit      ASSESSMENT       1. Seasonal allergic rhinitis due to pollen    2. Stable asthma, unspecified asthma severity, unspecified whether persistent    3. Cough           PLAN    Seasonal allergic rhinitis due to pollen  -     methylPREDNISolone (MEDROL DOSEPACK) 4 mg tablet; use as directed  Dispense: 1 each; Refill: 0  Continue Xyzal and Singulair as ordered.  Flonase.    Stable asthma, unspecified asthma severity, unspecified whether persistent -- no current issues, stable.    Cough  -     benzonatate (TESSALON) 100 MG capsule; Take 1-2 capsules (100-200 mg total) by mouth 3 (three) times daily as needed for Cough.  Dispense: 60 capsule; Refill: 0  -     promethazine-dextromethorphan (PROMETHAZINE-DM) 6.25-15 mg/5 mL Syrp; Take 5 mLs by mouth nightly as needed (cough).  Dispense: 50 mL; Refill: 0  Cough rx ordered for day/night.        Follow-up:  Contact office back in 2 to 3 days if worse or no better.  Otherwise, return prn.          Total time spent with patient was 15 minutes to include discussion, counseling and chart review.  All patient questions were answered at appointment.

## 2022-05-02 ENCOUNTER — PATIENT MESSAGE (OUTPATIENT)
Dept: ADMINISTRATIVE | Facility: HOSPITAL | Age: 37
End: 2022-05-02
Payer: COMMERCIAL

## 2022-05-24 DIAGNOSIS — J30.1 SEASONAL ALLERGIC RHINITIS DUE TO POLLEN: ICD-10-CM

## 2022-05-24 RX ORDER — MONTELUKAST SODIUM 10 MG/1
TABLET ORAL
Qty: 90 TABLET | Refills: 2 | Status: SHIPPED | OUTPATIENT
Start: 2022-05-24 | End: 2022-10-11 | Stop reason: SDUPTHER

## 2022-05-24 NOTE — TELEPHONE ENCOUNTER
Refill Authorization Note   Raiza Mcpherson  is requesting a refill authorization.  Brief Assessment and Rationale for Refill:  Defer     Medication Therapy Plan:       Medication Reconciliation Completed: No   Comments:     No Care Gaps recommended.     Note composed:4:20 PM 05/24/2022

## 2022-05-24 NOTE — TELEPHONE ENCOUNTER
No new care gaps identified.  [facility] Embedded Care Gaps. Reference number: [id number]. 5/24/2022   3:10:28 AM CDT

## 2022-08-04 ENCOUNTER — PATIENT MESSAGE (OUTPATIENT)
Dept: ADMINISTRATIVE | Facility: HOSPITAL | Age: 37
End: 2022-08-04
Payer: COMMERCIAL

## 2022-08-11 ENCOUNTER — PATIENT MESSAGE (OUTPATIENT)
Dept: INTERNAL MEDICINE | Facility: CLINIC | Age: 37
End: 2022-08-11
Payer: COMMERCIAL

## 2022-08-12 ENCOUNTER — TELEPHONE (OUTPATIENT)
Dept: INTERNAL MEDICINE | Facility: CLINIC | Age: 37
End: 2022-08-12

## 2022-08-12 ENCOUNTER — TELEPHONE (OUTPATIENT)
Dept: FAMILY MEDICINE | Facility: CLINIC | Age: 37
End: 2022-08-12
Payer: COMMERCIAL

## 2022-08-12 ENCOUNTER — OFFICE VISIT (OUTPATIENT)
Dept: INTERNAL MEDICINE | Facility: CLINIC | Age: 37
End: 2022-08-12
Payer: COMMERCIAL

## 2022-08-12 DIAGNOSIS — Z09 HOSPITAL DISCHARGE FOLLOW-UP: Primary | ICD-10-CM

## 2022-08-12 DIAGNOSIS — T78.3XXA ANGIOEDEMA, INITIAL ENCOUNTER: ICD-10-CM

## 2022-08-12 DIAGNOSIS — I10 ESSENTIAL HYPERTENSION: ICD-10-CM

## 2022-08-12 PROCEDURE — 4010F ACE/ARB THERAPY RXD/TAKEN: CPT | Mod: CPTII,95,, | Performed by: NURSE PRACTITIONER

## 2022-08-12 PROCEDURE — 1160F RVW MEDS BY RX/DR IN RCRD: CPT | Mod: CPTII,95,, | Performed by: NURSE PRACTITIONER

## 2022-08-12 PROCEDURE — 99441 PR PHYSICIAN TELEPHONE EVALUATION 5-10 MIN: CPT | Mod: 95,,, | Performed by: NURSE PRACTITIONER

## 2022-08-12 PROCEDURE — 4010F PR ACE/ARB THEARPY RXD/TAKEN: ICD-10-PCS | Mod: CPTII,95,, | Performed by: NURSE PRACTITIONER

## 2022-08-12 PROCEDURE — 99441 PR PHYSICIAN TELEPHONE EVALUATION 5-10 MIN: ICD-10-PCS | Mod: 95,,, | Performed by: NURSE PRACTITIONER

## 2022-08-12 PROCEDURE — 1160F PR REVIEW ALL MEDS BY PRESCRIBER/CLIN PHARMACIST DOCUMENTED: ICD-10-PCS | Mod: CPTII,95,, | Performed by: NURSE PRACTITIONER

## 2022-08-12 PROCEDURE — 1159F MED LIST DOCD IN RCRD: CPT | Mod: CPTII,95,, | Performed by: NURSE PRACTITIONER

## 2022-08-12 PROCEDURE — 1159F PR MEDICATION LIST DOCUMENTED IN MEDICAL RECORD: ICD-10-PCS | Mod: CPTII,95,, | Performed by: NURSE PRACTITIONER

## 2022-08-12 RX ORDER — HYDROCHLOROTHIAZIDE 25 MG/1
25 TABLET ORAL DAILY
Qty: 30 TABLET | Refills: 5 | Status: SHIPPED | OUTPATIENT
Start: 2022-08-12 | End: 2023-01-13 | Stop reason: SDUPTHER

## 2022-08-12 RX ORDER — AMLODIPINE BESYLATE 5 MG/1
5 TABLET ORAL DAILY
Qty: 30 TABLET | Refills: 5 | Status: SHIPPED | OUTPATIENT
Start: 2022-08-12 | End: 2023-01-13 | Stop reason: SDUPTHER

## 2022-08-12 NOTE — TELEPHONE ENCOUNTER
Pt stated that she went to the ER last night due to swelling lips from taking Amlodipine please advise     Tammy Belle MA

## 2022-08-12 NOTE — TELEPHONE ENCOUNTER
Pt contacted and assisted with scheduling an appt per Milagro SAMPSON regarding her blood pressure . //kah

## 2022-08-12 NOTE — PROGRESS NOTES
Subjective:       Patient ID: Raiza Mcpherson is a 37 y.o. female.    Chief Complaint: No chief complaint on file.    The patient location is: [place]  The chief complaint leading to consultation is: hypertension     Visit type: audio only    Face to Face time with patient: 5 minutes of total time spent on the encounter, which includes face to face time and non-face to face time preparing to see the patient (eg, review of tests), Obtaining and/or reviewing separately obtained history, Documenting clinical information in the electronic or other health record, Independently interpreting results (not separately reported) and communicating results to the patient/family/caregiver, or Care coordination (not separately reported).         Each patient to whom he or she provides medical services by telemedicine is:  (1) informed of the relationship between the physician and patient and the respective role of any other health care provider with respect to management of the patient; and (2) notified that he or she may decline to receive medical services by telemedicine and may withdraw from such care at any time.    Notes:     Patient presents for hospital follow up.     Went to ER on 08/11/2022 after having swelling to left side face, lip and throat.  Was told to hold the ACE Inhibitor.  Did not get another prescription.      Hypertension      Review of Systems      Objective:      Physical Exam  Pulmonary:      Effort: No respiratory distress.   Neurological:      Mental Status: She is alert.   Psychiatric:         Mood and Affect: Mood normal.         Assessment:       Problem List Items Addressed This Visit     Essential hypertension    Relevant Medications    hydroCHLOROthiazide (HYDRODIURIL) 25 MG tablet    amLODIPine (NORVASC) 5 MG tablet      Other Visit Diagnoses     Hospital discharge follow-up    -  Primary    Angioedema, initial encounter              Plan:         Hospital discharge follow-up    Essential  hypertension  -     hydroCHLOROthiazide (HYDRODIURIL) 25 MG tablet; Take 1 tablet (25 mg total) by mouth once daily.  Dispense: 30 tablet; Refill: 5  -     amLODIPine (NORVASC) 5 MG tablet; Take 1 tablet (5 mg total) by mouth once daily.  Dispense: 30 tablet; Refill: 5    Angioedema, initial encounter      Start amlodipine 5 mg and HCTZ 25 mg.     Follow up in 3-4 weeks.

## 2022-08-12 NOTE — TELEPHONE ENCOUNTER
----- Message from Alycia Licona sent at 8/12/2022  8:36 AM CDT -----  Regarding: same day  Contact: pt  Pt went to the ER last night. Was told she had a reaction to BP medication. Lip swelling. [phone]

## 2022-08-12 NOTE — TELEPHONE ENCOUNTER
I would advise her to discuss with her PCP, Dr. Browning, but it looks like she saw another provider today for issue.

## 2022-08-23 ENCOUNTER — PATIENT MESSAGE (OUTPATIENT)
Dept: INTERNAL MEDICINE | Facility: CLINIC | Age: 37
End: 2022-08-23
Payer: COMMERCIAL

## 2022-09-02 ENCOUNTER — TELEPHONE (OUTPATIENT)
Dept: INTERNAL MEDICINE | Facility: CLINIC | Age: 37
End: 2022-09-02
Payer: COMMERCIAL

## 2022-09-02 NOTE — TELEPHONE ENCOUNTER
I called the pt and assisted her with rescheduling her 9:20 am appt today with Milagro SAMPSON as she was a no call no show. She stated she forgot about the appt. She verbalized understanding of new appt date and time for 09/06/22. Vesta //kah

## 2022-10-11 ENCOUNTER — OFFICE VISIT (OUTPATIENT)
Dept: INTERNAL MEDICINE | Facility: CLINIC | Age: 37
End: 2022-10-11
Payer: COMMERCIAL

## 2022-10-11 DIAGNOSIS — J45.21 MILD INTERMITTENT ASTHMA WITH ACUTE EXACERBATION: Primary | ICD-10-CM

## 2022-10-11 DIAGNOSIS — J30.1 SEASONAL ALLERGIC RHINITIS DUE TO POLLEN: ICD-10-CM

## 2022-10-11 DIAGNOSIS — R05.1 ACUTE COUGH: ICD-10-CM

## 2022-10-11 PROCEDURE — 1159F MED LIST DOCD IN RCRD: CPT | Mod: CPTII,95,, | Performed by: NURSE PRACTITIONER

## 2022-10-11 PROCEDURE — 4010F PR ACE/ARB THEARPY RXD/TAKEN: ICD-10-PCS | Mod: CPTII,95,, | Performed by: NURSE PRACTITIONER

## 2022-10-11 PROCEDURE — 99213 PR OFFICE/OUTPT VISIT, EST, LEVL III, 20-29 MIN: ICD-10-PCS | Mod: 95,,, | Performed by: NURSE PRACTITIONER

## 2022-10-11 PROCEDURE — 1160F PR REVIEW ALL MEDS BY PRESCRIBER/CLIN PHARMACIST DOCUMENTED: ICD-10-PCS | Mod: CPTII,95,, | Performed by: NURSE PRACTITIONER

## 2022-10-11 PROCEDURE — 99213 OFFICE O/P EST LOW 20 MIN: CPT | Mod: 95,,, | Performed by: NURSE PRACTITIONER

## 2022-10-11 PROCEDURE — 1160F RVW MEDS BY RX/DR IN RCRD: CPT | Mod: CPTII,95,, | Performed by: NURSE PRACTITIONER

## 2022-10-11 PROCEDURE — 1159F PR MEDICATION LIST DOCUMENTED IN MEDICAL RECORD: ICD-10-PCS | Mod: CPTII,95,, | Performed by: NURSE PRACTITIONER

## 2022-10-11 PROCEDURE — 4010F ACE/ARB THERAPY RXD/TAKEN: CPT | Mod: CPTII,95,, | Performed by: NURSE PRACTITIONER

## 2022-10-11 RX ORDER — MONTELUKAST SODIUM 10 MG/1
10 TABLET ORAL NIGHTLY
Qty: 90 TABLET | Refills: 1 | Status: SHIPPED | OUTPATIENT
Start: 2022-10-11 | End: 2024-02-06

## 2022-10-11 RX ORDER — PROMETHAZINE HYDROCHLORIDE AND DEXTROMETHORPHAN HYDROBROMIDE 6.25; 15 MG/5ML; MG/5ML
5 SYRUP ORAL EVERY 6 HOURS PRN
Qty: 118 ML | Refills: 0 | Status: SHIPPED | OUTPATIENT
Start: 2022-10-11 | End: 2023-01-09

## 2022-10-11 RX ORDER — ALBUTEROL SULFATE 90 UG/1
1-2 AEROSOL, METERED RESPIRATORY (INHALATION) EVERY 6 HOURS PRN
Qty: 6.7 G | Refills: 1 | Status: SHIPPED | OUTPATIENT
Start: 2022-10-11 | End: 2022-11-29 | Stop reason: SDUPTHER

## 2022-10-11 RX ORDER — AZITHROMYCIN 250 MG/1
250 TABLET, FILM COATED ORAL DAILY
Qty: 6 TABLET | Refills: 0 | Status: SHIPPED | OUTPATIENT
Start: 2022-10-11 | End: 2023-01-09

## 2022-10-11 RX ORDER — PREDNISONE 20 MG/1
20 TABLET ORAL DAILY
Qty: 5 TABLET | Refills: 0 | Status: SHIPPED | OUTPATIENT
Start: 2022-10-11 | End: 2023-01-09

## 2022-10-11 NOTE — PROGRESS NOTES
Subjective:       Patient ID: Raiza Mcpherson is a 37 y.o. female.    Chief Complaint: URI    The patient location is: [place]  The chief complaint leading to consultation is: URI    Visit type: audiovisual    Face to Face time with patient: 5 minutes of total time spent on the encounter, which includes face to face time and non-face to face time preparing to see the patient (eg, review of tests), Obtaining and/or reviewing separately obtained history, Documenting clinical information in the electronic or other health record, Independently interpreting results (not separately reported) and communicating results to the patient/family/caregiver, or Care coordination (not separately reported).         Each patient to whom he or she provides medical services by telemedicine is:  (1) informed of the relationship between the physician and patient and the respective role of any other health care provider with respect to management of the patient; and (2) notified that he or she may decline to receive medical services by telemedicine and may withdraw from such care at any time.    Notes:      Patient presents with cough, wheezing, and body aches.  Started about one week ago but is getting worse.  No Covid test.      Cough  This is a new problem. The current episode started yesterday. The problem has been gradually worsening. The problem occurs every few hours. The cough is Non-productive. Associated symptoms include headaches, myalgias, nasal congestion, shortness of breath and wheezing. Pertinent negatives include no fever.   Review of Systems   Constitutional:  Negative for fever.   Respiratory:  Positive for cough, shortness of breath and wheezing.    Gastrointestinal:  Negative for abdominal pain.   Musculoskeletal:  Positive for arthralgias and myalgias.   Neurological:  Positive for headaches.   Psychiatric/Behavioral:  Negative for agitation and confusion.        Objective:      Physical Exam  Pulmonary:       Effort: Pulmonary effort is normal. No respiratory distress.   Neurological:      General: No focal deficit present.      Mental Status: She is alert.   Psychiatric:         Mood and Affect: Mood normal.       Assessment:       Problem List Items Addressed This Visit       Mild intermittent asthma without complication - Primary    Relevant Medications    predniSONE (DELTASONE) 20 MG tablet    albuterol (PROVENTIL/VENTOLIN HFA) 90 mcg/actuation inhaler    montelukast (SINGULAIR) 10 mg tablet    promethazine-dextromethorphan (PROMETHAZINE-DM) 6.25-15 mg/5 mL Syrp    Seasonal allergic rhinitis due to pollen    Relevant Medications    montelukast (SINGULAIR) 10 mg tablet     Other Visit Diagnoses       Cough        Relevant Medications    promethazine-dextromethorphan (PROMETHAZINE-DM) 6.25-15 mg/5 mL Syrp            Plan:         Mild intermittent asthma with acute exacerbation  -     predniSONE (DELTASONE) 20 MG tablet; Take 1 tablet (20 mg total) by mouth once daily.  Dispense: 5 tablet; Refill: 0  -     albuterol (PROVENTIL/VENTOLIN HFA) 90 mcg/actuation inhaler; Inhale 1-2 puffs into the lungs every 6 (six) hours as needed for Wheezing (cough).  Dispense: 6.7 g; Refill: 1  -     montelukast (SINGULAIR) 10 mg tablet; Take 1 tablet (10 mg total) by mouth every evening.  Dispense: 90 tablet; Refill: 1  -     promethazine-dextromethorphan (PROMETHAZINE-DM) 6.25-15 mg/5 mL Syrp; Take 5 mLs by mouth every 6 (six) hours as needed (cough).  Dispense: 118 mL; Refill: 0    Seasonal allergic rhinitis due to pollen  -     montelukast (SINGULAIR) 10 mg tablet; Take 1 tablet (10 mg total) by mouth every evening.  Dispense: 90 tablet; Refill: 1    Cough  -     promethazine-dextromethorphan (PROMETHAZINE-DM) 6.25-15 mg/5 mL Syrp; Take 5 mLs by mouth every 6 (six) hours as needed (cough).  Dispense: 118 mL; Refill: 0    Other orders  -     azithromycin (Z-CONSUELO) 250 MG tablet; Take 1 tablet (250 mg total) by mouth once daily. Take 2  tablets by mouth on day 1, then one tablet daily on days 2-5.  Dispense: 6 tablet; Refill: 0       Instructed to take all medications as ordered.  Informed if no improvement or symptoms worsens to follow up with primary care physician.  Informed to hydrate and rest.

## 2022-11-29 DIAGNOSIS — J45.21 MILD INTERMITTENT ASTHMA WITH ACUTE EXACERBATION: ICD-10-CM

## 2022-11-30 RX ORDER — ALBUTEROL SULFATE 90 UG/1
1-2 AEROSOL, METERED RESPIRATORY (INHALATION) EVERY 6 HOURS PRN
Qty: 6.7 G | Refills: 1 | Status: SHIPPED | OUTPATIENT
Start: 2022-11-30

## 2023-01-04 ENCOUNTER — PATIENT MESSAGE (OUTPATIENT)
Dept: SURGERY | Facility: CLINIC | Age: 38
End: 2023-01-04
Payer: COMMERCIAL

## 2023-01-06 ENCOUNTER — OFFICE VISIT (OUTPATIENT)
Dept: SURGERY | Facility: CLINIC | Age: 38
End: 2023-01-06
Payer: COMMERCIAL

## 2023-01-06 ENCOUNTER — LAB VISIT (OUTPATIENT)
Dept: LAB | Facility: HOSPITAL | Age: 38
End: 2023-01-06
Attending: SURGERY
Payer: COMMERCIAL

## 2023-01-06 VITALS
HEART RATE: 83 BPM | BODY MASS INDEX: 39.17 KG/M2 | WEIGHT: 228.19 LBS | SYSTOLIC BLOOD PRESSURE: 129 MMHG | DIASTOLIC BLOOD PRESSURE: 80 MMHG

## 2023-01-06 DIAGNOSIS — K43.2 INCISIONAL HERNIA OF ANTERIOR ABDOMINAL WALL WITHOUT OBSTRUCTION OR GANGRENE: ICD-10-CM

## 2023-01-06 DIAGNOSIS — K43.0 RECURRENT INCISIONAL HERNIA WITH INCARCERATION: Primary | ICD-10-CM

## 2023-01-06 DIAGNOSIS — K43.2 INCISIONAL HERNIA, WITHOUT OBSTRUCTION OR GANGRENE: ICD-10-CM

## 2023-01-06 LAB
ALBUMIN SERPL BCP-MCNC: 3.8 G/DL (ref 3.5–5.2)
ALP SERPL-CCNC: 53 U/L (ref 55–135)
ALT SERPL W/O P-5'-P-CCNC: 13 U/L (ref 10–44)
ANION GAP SERPL CALC-SCNC: 9 MMOL/L (ref 8–16)
AST SERPL-CCNC: 14 U/L (ref 10–40)
BASOPHILS # BLD AUTO: 0.04 K/UL (ref 0–0.2)
BASOPHILS NFR BLD: 0.7 % (ref 0–1.9)
BILIRUB SERPL-MCNC: 0.5 MG/DL (ref 0.1–1)
BUN SERPL-MCNC: 11 MG/DL (ref 6–20)
CALCIUM SERPL-MCNC: 9.4 MG/DL (ref 8.7–10.5)
CHLORIDE SERPL-SCNC: 105 MMOL/L (ref 95–110)
CHOLEST SERPL-MCNC: 131 MG/DL (ref 120–199)
CHOLEST/HDLC SERPL: 3 {RATIO} (ref 2–5)
CO2 SERPL-SCNC: 26 MMOL/L (ref 23–29)
CREAT SERPL-MCNC: 1.1 MG/DL (ref 0.5–1.4)
DIFFERENTIAL METHOD: ABNORMAL
EOSINOPHIL # BLD AUTO: 0.3 K/UL (ref 0–0.5)
EOSINOPHIL NFR BLD: 4.9 % (ref 0–8)
ERYTHROCYTE [DISTWIDTH] IN BLOOD BY AUTOMATED COUNT: 14 % (ref 11.5–14.5)
EST. GFR  (NO RACE VARIABLE): >60 ML/MIN/1.73 M^2
GLUCOSE SERPL-MCNC: 95 MG/DL (ref 70–110)
HCT VFR BLD AUTO: 42 % (ref 37–48.5)
HDLC SERPL-MCNC: 44 MG/DL (ref 40–75)
HDLC SERPL: 33.6 % (ref 20–50)
HGB BLD-MCNC: 12.6 G/DL (ref 12–16)
IMM GRANULOCYTES # BLD AUTO: 0.02 K/UL (ref 0–0.04)
IMM GRANULOCYTES NFR BLD AUTO: 0.3 % (ref 0–0.5)
LDLC SERPL CALC-MCNC: 76.8 MG/DL (ref 63–159)
LYMPHOCYTES # BLD AUTO: 2.2 K/UL (ref 1–4.8)
LYMPHOCYTES NFR BLD: 36.9 % (ref 18–48)
MCH RBC QN AUTO: 26.3 PG (ref 27–31)
MCHC RBC AUTO-ENTMCNC: 30 G/DL (ref 32–36)
MCV RBC AUTO: 88 FL (ref 82–98)
MONOCYTES # BLD AUTO: 0.4 K/UL (ref 0.3–1)
MONOCYTES NFR BLD: 6.9 % (ref 4–15)
NEUTROPHILS # BLD AUTO: 3 K/UL (ref 1.8–7.7)
NEUTROPHILS NFR BLD: 50.3 % (ref 38–73)
NONHDLC SERPL-MCNC: 87 MG/DL
NRBC BLD-RTO: 0 /100 WBC
PLATELET # BLD AUTO: 280 K/UL (ref 150–450)
PMV BLD AUTO: 12.3 FL (ref 9.2–12.9)
POTASSIUM SERPL-SCNC: 4 MMOL/L (ref 3.5–5.1)
PROT SERPL-MCNC: 7.4 G/DL (ref 6–8.4)
RBC # BLD AUTO: 4.79 M/UL (ref 4–5.4)
SODIUM SERPL-SCNC: 140 MMOL/L (ref 136–145)
TRIGL SERPL-MCNC: 51 MG/DL (ref 30–150)
TSH SERPL DL<=0.005 MIU/L-ACNC: 0.41 UIU/ML (ref 0.4–4)
WBC # BLD AUTO: 5.96 K/UL (ref 3.9–12.7)

## 2023-01-06 PROCEDURE — 3079F PR MOST RECENT DIASTOLIC BLOOD PRESSURE 80-89 MM HG: ICD-10-PCS | Mod: CPTII,S$GLB,, | Performed by: SURGERY

## 2023-01-06 PROCEDURE — 3074F PR MOST RECENT SYSTOLIC BLOOD PRESSURE < 130 MM HG: ICD-10-PCS | Mod: CPTII,S$GLB,, | Performed by: SURGERY

## 2023-01-06 PROCEDURE — 80061 LIPID PANEL: CPT | Performed by: SURGERY

## 2023-01-06 PROCEDURE — 36415 COLL VENOUS BLD VENIPUNCTURE: CPT | Performed by: SURGERY

## 2023-01-06 PROCEDURE — 3008F PR BODY MASS INDEX (BMI) DOCUMENTED: ICD-10-PCS | Mod: CPTII,S$GLB,, | Performed by: SURGERY

## 2023-01-06 PROCEDURE — 99999 PR PBB SHADOW E&M-EST. PATIENT-LVL IV: CPT | Mod: PBBFAC,,, | Performed by: SURGERY

## 2023-01-06 PROCEDURE — 3079F DIAST BP 80-89 MM HG: CPT | Mod: CPTII,S$GLB,, | Performed by: SURGERY

## 2023-01-06 PROCEDURE — 99215 PR OFFICE/OUTPT VISIT, EST, LEVL V, 40-54 MIN: ICD-10-PCS | Mod: S$GLB,,, | Performed by: SURGERY

## 2023-01-06 PROCEDURE — 3074F SYST BP LT 130 MM HG: CPT | Mod: CPTII,S$GLB,, | Performed by: SURGERY

## 2023-01-06 PROCEDURE — 85025 COMPLETE CBC W/AUTO DIFF WBC: CPT | Performed by: SURGERY

## 2023-01-06 PROCEDURE — 99215 OFFICE O/P EST HI 40 MIN: CPT | Mod: S$GLB,,, | Performed by: SURGERY

## 2023-01-06 PROCEDURE — 1159F PR MEDICATION LIST DOCUMENTED IN MEDICAL RECORD: ICD-10-PCS | Mod: CPTII,S$GLB,, | Performed by: SURGERY

## 2023-01-06 PROCEDURE — 3008F BODY MASS INDEX DOCD: CPT | Mod: CPTII,S$GLB,, | Performed by: SURGERY

## 2023-01-06 PROCEDURE — 80053 COMPREHEN METABOLIC PANEL: CPT | Performed by: SURGERY

## 2023-01-06 PROCEDURE — 99999 PR PBB SHADOW E&M-EST. PATIENT-LVL IV: ICD-10-PCS | Mod: PBBFAC,,, | Performed by: SURGERY

## 2023-01-06 PROCEDURE — 1159F MED LIST DOCD IN RCRD: CPT | Mod: CPTII,S$GLB,, | Performed by: SURGERY

## 2023-01-06 PROCEDURE — 84443 ASSAY THYROID STIM HORMONE: CPT | Performed by: SURGERY

## 2023-01-06 RX ORDER — LIDOCAINE HYDROCHLORIDE 10 MG/ML
1 INJECTION, SOLUTION EPIDURAL; INFILTRATION; INTRACAUDAL; PERINEURAL ONCE
Status: DISCONTINUED | OUTPATIENT
Start: 2023-01-06 | End: 2024-01-29

## 2023-01-09 ENCOUNTER — OFFICE VISIT (OUTPATIENT)
Dept: INTERNAL MEDICINE | Facility: CLINIC | Age: 38
End: 2023-01-09
Payer: COMMERCIAL

## 2023-01-09 VITALS
OXYGEN SATURATION: 99 % | DIASTOLIC BLOOD PRESSURE: 80 MMHG | HEART RATE: 86 BPM | WEIGHT: 229.5 LBS | SYSTOLIC BLOOD PRESSURE: 118 MMHG | RESPIRATION RATE: 18 BRPM | BODY MASS INDEX: 39.18 KG/M2 | HEIGHT: 64 IN

## 2023-01-09 DIAGNOSIS — J30.1 SEASONAL ALLERGIC RHINITIS DUE TO POLLEN: ICD-10-CM

## 2023-01-09 DIAGNOSIS — J45.20 MILD INTERMITTENT ASTHMA WITHOUT COMPLICATION: ICD-10-CM

## 2023-01-09 DIAGNOSIS — Z00.00 ROUTINE GENERAL MEDICAL EXAMINATION AT A HEALTH CARE FACILITY: Primary | ICD-10-CM

## 2023-01-09 DIAGNOSIS — I10 ESSENTIAL HYPERTENSION: ICD-10-CM

## 2023-01-09 DIAGNOSIS — E66.01 SEVERE OBESITY (BMI 35.0-39.9) WITH COMORBIDITY: ICD-10-CM

## 2023-01-09 PROCEDURE — 1160F PR REVIEW ALL MEDS BY PRESCRIBER/CLIN PHARMACIST DOCUMENTED: ICD-10-PCS | Mod: CPTII,S$GLB,, | Performed by: FAMILY MEDICINE

## 2023-01-09 PROCEDURE — 99999 PR PBB SHADOW E&M-EST. PATIENT-LVL IV: ICD-10-PCS | Mod: PBBFAC,,, | Performed by: FAMILY MEDICINE

## 2023-01-09 PROCEDURE — 3079F DIAST BP 80-89 MM HG: CPT | Mod: CPTII,S$GLB,, | Performed by: FAMILY MEDICINE

## 2023-01-09 PROCEDURE — 99999 PR PBB SHADOW E&M-EST. PATIENT-LVL IV: CPT | Mod: PBBFAC,,, | Performed by: FAMILY MEDICINE

## 2023-01-09 PROCEDURE — 3074F SYST BP LT 130 MM HG: CPT | Mod: CPTII,S$GLB,, | Performed by: FAMILY MEDICINE

## 2023-01-09 PROCEDURE — 3079F PR MOST RECENT DIASTOLIC BLOOD PRESSURE 80-89 MM HG: ICD-10-PCS | Mod: CPTII,S$GLB,, | Performed by: FAMILY MEDICINE

## 2023-01-09 PROCEDURE — 1160F RVW MEDS BY RX/DR IN RCRD: CPT | Mod: CPTII,S$GLB,, | Performed by: FAMILY MEDICINE

## 2023-01-09 PROCEDURE — 3074F PR MOST RECENT SYSTOLIC BLOOD PRESSURE < 130 MM HG: ICD-10-PCS | Mod: CPTII,S$GLB,, | Performed by: FAMILY MEDICINE

## 2023-01-09 PROCEDURE — 3008F PR BODY MASS INDEX (BMI) DOCUMENTED: ICD-10-PCS | Mod: CPTII,S$GLB,, | Performed by: FAMILY MEDICINE

## 2023-01-09 PROCEDURE — 3008F BODY MASS INDEX DOCD: CPT | Mod: CPTII,S$GLB,, | Performed by: FAMILY MEDICINE

## 2023-01-09 PROCEDURE — 99395 PREV VISIT EST AGE 18-39: CPT | Mod: S$GLB,,, | Performed by: FAMILY MEDICINE

## 2023-01-09 PROCEDURE — 1159F PR MEDICATION LIST DOCUMENTED IN MEDICAL RECORD: ICD-10-PCS | Mod: CPTII,S$GLB,, | Performed by: FAMILY MEDICINE

## 2023-01-09 PROCEDURE — 1159F MED LIST DOCD IN RCRD: CPT | Mod: CPTII,S$GLB,, | Performed by: FAMILY MEDICINE

## 2023-01-09 PROCEDURE — 99395 PR PREVENTIVE VISIT,EST,18-39: ICD-10-PCS | Mod: S$GLB,,, | Performed by: FAMILY MEDICINE

## 2023-01-09 RX ORDER — BENAZEPRIL/HYDROCHLOROTHIAZIDE 20 MG-25MG
1 TABLET ORAL
COMMUNITY
Start: 2022-08-21 | End: 2023-01-09

## 2023-01-09 NOTE — PROGRESS NOTES
[facility] -   General Surgery History & Physical    SUBJECTIVE:     History of Present Illness:  Patient is a 37 y.o. female presents to discuss hernia repair. She has a history of robotic ventral hernia repair x2 with mesh on 11/27/19 and then robotic cholecystectomy on 11/25/20, both by Dr. Pagan. She reports a recurrent hernia above the umbilicus at one of her port sites that causes her discomfort.      Review of patient's allergies indicates:   Allergen Reactions    Ace inhibitors Swelling       Current Outpatient Medications   Medication Sig Dispense Refill    albuterol (PROVENTIL) 2.5 mg /3 mL (0.083 %) nebulizer solution Take 3 mLs (2.5 mg total) by nebulization every 6 (six) hours as needed for Wheezing or Shortness of Breath. Rescue 1 each 5    albuterol (PROVENTIL/VENTOLIN HFA) 90 mcg/actuation inhaler Inhale 1-2 puffs into the lungs every 6 (six) hours as needed for Wheezing (cough). 6.7 g 1    amLODIPine (NORVASC) 5 MG tablet Take 1 tablet (5 mg total) by mouth once daily. 30 tablet 5    azithromycin (Z-CONSUELO) 250 MG tablet Take 1 tablet (250 mg total) by mouth once daily. Take 2 tablets by mouth on day 1, then one tablet daily on days 2-5. 6 tablet 0    benzonatate (TESSALON) 100 MG capsule Take 1-2 capsules (100-200 mg total) by mouth 3 (three) times daily as needed for Cough. 60 capsule 0    CRYSELLE, 28, 0.3-30 mg-mcg per tablet TAKE 1 TABLET BY MOUTH EVERY DAY 28 tablet 5    fluticasone propionate (FLONASE) 50 mcg/actuation nasal spray SHAKE LIQUID AND USE 1 SPRAY(50 MCG) IN EACH NOSTRIL EVERY DAY 16 g 0    hydroCHLOROthiazide (HYDRODIURIL) 25 MG tablet Take 1 tablet (25 mg total) by mouth once daily. 30 tablet 5    methylPREDNISolone (MEDROL DOSEPACK) 4 mg tablet use as directed 1 each 0    montelukast (SINGULAIR) 10 mg tablet Take 1 tablet (10 mg total) by mouth every evening. 90 tablet 1    predniSONE (DELTASONE) 20 MG tablet Take 1 tablet (20 mg total) by mouth once daily. 5  tablet 0    promethazine-dextromethorphan (PROMETHAZINE-DM) 6.25-15 mg/5 mL Syrp Take 5 mLs by mouth every 6 (six) hours as needed (cough). 118 mL 0     Current Facility-Administered Medications   Medication Dose Route Frequency Provider Last Rate Last Admin    LIDOcaine (PF) 10 mg/ml (1%) injection 10 mg  1 mL Intradermal Once Jaymie Roche, DO           Past Medical History:   Diagnosis Date    Asthma     childhood    Hypertension      Past Surgical History:   Procedure Laterality Date    EXCISION OF MASS OF ABDOMEN N/A 11/27/2019    Procedure: EXCISION, MASS, ABDOMEN;  Surgeon: Angie Pagan MD;  Location: [facility] OR;  Service: General;  Laterality: N/A;  XI ROBOTIC      ROBOT-ASSISTED CHOLECYSTECTOMY USING DA PHIL XI N/A 11/25/2020    Procedure: XI ROBOTIC CHOLECYSTECTOMY;  Surgeon: Angie Pagan MD;  Location: [facility] OR;  Service: General;  Laterality: N/A;    ROBOT-ASSISTED REPAIR OF VENTRAL HERNIA USING DA PHIL XI N/A 11/27/2019    Procedure: XI ROBOTIC REPAIR, HERNIA, VENTRAL;  Surgeon: Angie Pagan MD;  Location: [facility] OR;  Service: General;  Laterality: N/A;     Family History   Problem Relation Age of Onset    Hypertension Father     Kidney disease Paternal Grandmother     Cancer Paternal Grandmother     Kidney disease Paternal Grandfather     Vision loss Paternal Grandfather     Hypertension Mother      Social History     Tobacco Use    Smoking status: Never    Smokeless tobacco: Never   Substance Use Topics    Alcohol use: No    Drug use: No        Review of Systems:  Review of Systems   Constitutional:  Negative for fever.   Gastrointestinal:  Positive for abdominal pain. Negative for nausea and vomiting.     OBJECTIVE:     Vital Signs (Most Recent)  Pulse: 83 (01/06/23 0911)  BP: 129/80 (01/06/23 0911)     103.5 kg (228 lb 2.8 oz)     Physical Exam:  Physical Exam  Vitals and nursing note reviewed.   Constitutional:       General: She is not in acute distress.     Appearance: She  is obese. She is not ill-appearing, toxic-appearing or diaphoretic.   HENT:      Head: Normocephalic and atraumatic.      Nose: Nose normal.   Eyes:      General: No scleral icterus.        Right eye: No discharge.         Left eye: No discharge.      Extraocular Movements: Extraocular movements intact.   Cardiovascular:      Rate and Rhythm: Normal rate and regular rhythm.   Pulmonary:      Effort: No respiratory distress.      Breath sounds: No stridor.   Abdominal:      Palpations: Abdomen is soft.      Tenderness: There is abdominal tenderness.      Hernia: A hernia is present.      Comments: Palpable tender hernia above the umbilicus at previous port site   Musculoskeletal:      Cervical back: No rigidity.   Skin:     General: Skin is warm and dry.      Coloration: Skin is not jaundiced.   Neurological:      Mental Status: She is alert and oriented to person, place, and time.   Psychiatric:         Mood and Affect: Mood normal.         Behavior: Behavior normal.       Laboratory  WBC   Date Value Ref Range Status   01/06/2023 5.96 3.90 - 12.70 K/uL Final     Hemoglobin   Date Value Ref Range Status   01/06/2023 12.6 12.0 - 16.0 g/dL Final     Hematocrit   Date Value Ref Range Status   01/06/2023 42.0 37.0 - 48.5 % Final     Platelets   Date Value Ref Range Status   01/06/2023 280 150 - 450 K/uL Final     Sodium   Date Value Ref Range Status   01/06/2023 140 136 - 145 mmol/L Final     Potassium   Date Value Ref Range Status   01/06/2023 4.0 3.5 - 5.1 mmol/L Final     Creatinine   Date Value Ref Range Status   01/06/2023 1.1 0.5 - 1.4 mg/dL Final     Alkaline Phosphatase   Date Value Ref Range Status   01/06/2023 53 (L) 55 - 135 U/L Final     AST   Date Value Ref Range Status   01/06/2023 14 10 - 40 U/L Final     ALT   Date Value Ref Range Status   01/06/2023 13 10 - 44 U/L Final     Lipase   Date Value Ref Range Status   11/20/2020 16 4 - 60 U/L Final          ASSESSMENT/PLAN:     Raiza was seen today for  hernia.    Diagnoses and all orders for this visit:    Recurrent incisional hernia with incarceration  -     Full code; Standing  -     Place in Outpatient; Standing  -     Case Request Operating Room: XI ROBOTIC REPAIR, HERNIA, INCISIONAL  -     Insert peripheral IV; Standing  -     Diet NPO; Standing  -     Place sequential compression device; Standing  -     Comprehensive metabolic panel; Future  -     CBC auto differential; Future  -     Full code  -     Insert peripheral IV  -     LIPID PANEL; Future  -     TSH; Future    Incisional hernia, without obstruction or gangrene    Other orders  -     LIDOcaine (PF) 10 mg/ml (1%) injection 10 mg  -     ceFAZolin (ANCEF) 2 g in dextrose 5 % 50 mL IVPB         Will plan to do robotically with mesh although I explained that if there is extensive scarring, then will need to convert to open.    After explaining the risks (including bleeding, infection, impaired wound healing, scarring, damage to other organs, vascular complications, cardiopulmonary complications, and death), benefits, and alternatives, patient verbalized understanding and would like to proceed with surgery. All questions were answered to their satisfaction.      Patient expressed understanding and is in agreement.      Jaymie Roche, DO  General Surgery  [facility] [place]

## 2023-01-09 NOTE — PROGRESS NOTES
"Subjective:       Patient ID: Raiza Mcpherson is a 37 y.o. female.    Chief Complaint: Annual Exam    37-year-old female patient with Patient Active Problem List:     Severe obesity (BMI 35.0-39.9) with comorbidity     Essential hypertension     Mild intermittent asthma without complication     Seasonal allergic rhinitis due to pollen     Eczema  Here for routine annual physicals and to discuss about recent test results.  Patient has been taking her medications regularly including inhalers and has been doing well, denies any chest pain or difficulty breathing with palpitations but will start exercise and lifestyle modifications with restricting soda beverages    Review of Systems   Constitutional:  Negative for activity change and fatigue.   HENT:  Negative for hearing loss and trouble swallowing.    Eyes:  Negative for discharge and visual disturbance.   Respiratory:  Positive for wheezing. Negative for chest tightness and shortness of breath.    Cardiovascular:  Negative for chest pain, palpitations and leg swelling.   Gastrointestinal:  Negative for abdominal pain, constipation, diarrhea, nausea and vomiting.   Genitourinary:  Negative for difficulty urinating and hematuria.   Musculoskeletal:  Negative for myalgias.   Skin:  Negative for rash.   Neurological:  Negative for light-headedness and headaches.   Psychiatric/Behavioral:  Negative for dysphoric mood and sleep disturbance.        /80 (BP Location: Left arm, Patient Position: Sitting, BP Method: Large (Manual))   Pulse 86   Resp 18   Ht 5' 4" (1.626 m)   Wt 104.1 kg (229 lb 8 oz)   SpO2 99%   BMI 39.39 kg/m²   Objective:      Physical Exam  Constitutional:       Appearance: She is well-developed. She is obese.   HENT:      Head: Normocephalic and atraumatic.   Cardiovascular:      Rate and Rhythm: Normal rate and regular rhythm.      Heart sounds: Normal heart sounds. No murmur heard.  Pulmonary:      Effort: Pulmonary effort is " normal.      Breath sounds: Normal breath sounds. No wheezing.   Abdominal:      General: Bowel sounds are normal.      Palpations: Abdomen is soft.      Tenderness: There is no abdominal tenderness.   Skin:     General: Skin is warm and dry.      Findings: No rash.   Neurological:      Mental Status: She is alert and oriented to person, place, and time.   Psychiatric:         Mood and Affect: Mood normal.       Lab Visit on 01/06/2023   Component Date Value Ref Range Status    Sodium 01/06/2023 140  136 - 145 mmol/L Final    Potassium 01/06/2023 4.0  3.5 - 5.1 mmol/L Final    Chloride 01/06/2023 105  95 - 110 mmol/L Final    CO2 01/06/2023 26  23 - 29 mmol/L Final    Glucose 01/06/2023 95  70 - 110 mg/dL Final    BUN 01/06/2023 11  6 - 20 mg/dL Final    Creatinine 01/06/2023 1.1  0.5 - 1.4 mg/dL Final    Calcium 01/06/2023 9.4  8.7 - 10.5 mg/dL Final    Total Protein 01/06/2023 7.4  6.0 - 8.4 g/dL Final    Albumin 01/06/2023 3.8  3.5 - 5.2 g/dL Final    Total Bilirubin 01/06/2023 0.5  0.1 - 1.0 mg/dL Final    Comment: For infants and newborns, interpretation of results should be based  on gestational age, weight and in agreement with clinical  observations.    Premature Infant recommended reference ranges:  Up to 24 hours.............<8.0 mg/dL  Up to 48 hours............<12.0 mg/dL  3-5 days..................<15.0 mg/dL  6-29 days.................<15.0 mg/dL      Alkaline Phosphatase 01/06/2023 53 (L)  55 - 135 U/L Final    AST 01/06/2023 14  10 - 40 U/L Final    ALT 01/06/2023 13  10 - 44 U/L Final    Anion Gap 01/06/2023 9  8 - 16 mmol/L Final    eGFR 01/06/2023 >60.0  >60 mL/min/1.73 m^2 Final    WBC 01/06/2023 5.96  3.90 - 12.70 K/uL Final    RBC 01/06/2023 4.79  4.00 - 5.40 M/uL Final    Hemoglobin 01/06/2023 12.6  12.0 - 16.0 g/dL Final    Hematocrit 01/06/2023 42.0  37.0 - 48.5 % Final    MCV 01/06/2023 88  82 - 98 fL Final    MCH 01/06/2023 26.3 (L)  27.0 - 31.0 pg Final    MCHC 01/06/2023 30.0 (L)  32.0 -  36.0 g/dL Final    RDW 01/06/2023 14.0  11.5 - 14.5 % Final    Platelets 01/06/2023 280  150 - 450 K/uL Final    MPV 01/06/2023 12.3  9.2 - 12.9 fL Final    Immature Granulocytes 01/06/2023 0.3  0.0 - 0.5 % Final    Gran # (ANC) 01/06/2023 3.0  1.8 - 7.7 K/uL Final    Immature Grans (Abs) 01/06/2023 0.02  0.00 - 0.04 K/uL Final    Comment: Mild elevation in immature granulocytes is non specific and   can be seen in a variety of conditions including stress response,   acute inflammation, trauma and pregnancy. Correlation with other   laboratory and clinical findings is essential.      Lymph # 01/06/2023 2.2  1.0 - 4.8 K/uL Final    Mono # 01/06/2023 0.4  0.3 - 1.0 K/uL Final    Eos # 01/06/2023 0.3  0.0 - 0.5 K/uL Final    Baso # 01/06/2023 0.04  0.00 - 0.20 K/uL Final    nRBC 01/06/2023 0  0 /100 WBC Final    Gran % 01/06/2023 50.3  38.0 - 73.0 % Final    Lymph % 01/06/2023 36.9  18.0 - 48.0 % Final    Mono % 01/06/2023 6.9  4.0 - 15.0 % Final    Eosinophil % 01/06/2023 4.9  0.0 - 8.0 % Final    Basophil % 01/06/2023 0.7  0.0 - 1.9 % Final    Differential Method 01/06/2023 Automated   Final    Cholesterol 01/06/2023 131  120 - 199 mg/dL Final    Comment: The National Cholesterol Education Program (NCEP) has set the  following guidelines (reference ranges) for Cholesterol:  Optimal.....................<200 mg/dL  Borderline High.............200-239 mg/dL  High........................> or = 240 mg/dL      Triglycerides 01/06/2023 51  30 - 150 mg/dL Final    Comment: The National Cholesterol Education Program (NCEP) has set the  following guidelines (reference values) for triglycerides:  Normal......................<150 mg/dL  Borderline High.............150-199 mg/dL  High........................200-499 mg/dL      HDL 01/06/2023 44  40 - 75 mg/dL Final    Comment: The National Cholesterol Education Program (NCEP) has set the  following guidelines (reference values) for HDL Cholesterol:  Low...............<40  mg/dL  Optimal...........>60 mg/dL      LDL Cholesterol 01/06/2023 76.8  63.0 - 159.0 mg/dL Final    Comment: The National Cholesterol Education Program (NCEP) has set the  following guidelines (reference values) for LDL Cholesterol:  Optimal.......................<130 mg/dL  Borderline High...............130-159 mg/dL  High..........................160-189 mg/dL  Very High.....................>190 mg/dL      HDL/Cholesterol Ratio 01/06/2023 33.6  20.0 - 50.0 % Final    Total Cholesterol/HDL Ratio 01/06/2023 3.0  2.0 - 5.0 Final    Non-HDL Cholesterol 01/06/2023 87  mg/dL Final    Comment: Risk category and Non-HDL cholesterol goals:  Coronary heart disease (CHD)or equivalent (10-year risk of CHD >20%):  Non-HDL cholesterol goal     <130 mg/dL  Two or more CHD risk factors and 10-year risk of CHD <= 20%:  Non-HDL cholesterol goal     <160 mg/dL  0 to 1 CHD risk factor:  Non-HDL cholesterol goal     <190 mg/dL      TSH 01/06/2023 0.407  0.400 - 4.000 uIU/mL Final       Assessment/Plan:   1. Routine general medical examination at a health care facility  Vital signs stable today.  Clinical exam stable  Continue lifestyle modifications with low-fat and low-cholesterol diet and exercise 30 minutes daily  Patient is scheduled to get Pap smear next month by gynecologist    2. Essential hypertension  Blood pressure is stable currently on amlodipine 5 mg and hydrochlorothiazide 25 mg daily    3. Seasonal allergic rhinitis due to pollen  Stable on Singulair and Flonase    4. Mild intermittent asthma without complication  Clinically doing well on albuterol inhaler as needed    5. Severe obesity (BMI 35.0-39.9) with comorbidity   Lifestyle modifications recommended to lose weight with BMI 39  If no improvement with weight loss in next 3-6 months will consider further evaluation

## 2023-01-13 ENCOUNTER — PATIENT MESSAGE (OUTPATIENT)
Dept: INTERNAL MEDICINE | Facility: CLINIC | Age: 38
End: 2023-01-13
Payer: COMMERCIAL

## 2023-01-13 DIAGNOSIS — I10 ESSENTIAL HYPERTENSION: ICD-10-CM

## 2023-01-13 RX ORDER — HYDROCHLOROTHIAZIDE 25 MG/1
25 TABLET ORAL DAILY
Qty: 90 TABLET | Refills: 3 | Status: SHIPPED | OUTPATIENT
Start: 2023-01-13 | End: 2024-02-06 | Stop reason: SDUPTHER

## 2023-01-13 RX ORDER — AMLODIPINE BESYLATE 5 MG/1
5 TABLET ORAL DAILY
Qty: 90 TABLET | Refills: 3 | Status: SHIPPED | OUTPATIENT
Start: 2023-01-13 | End: 2024-02-06 | Stop reason: SDUPTHER

## 2023-01-13 NOTE — TELEPHONE ENCOUNTER
No new care gaps identified.  [facility] Embedded Care Gaps. Reference number: [id number]. 1/13/2023   1:53:34 PM CST

## 2023-01-20 ENCOUNTER — TELEPHONE (OUTPATIENT)
Dept: PREADMISSION TESTING | Facility: HOSPITAL | Age: 38
End: 2023-01-20
Payer: COMMERCIAL

## 2023-01-27 ENCOUNTER — TELEPHONE (OUTPATIENT)
Dept: PREADMISSION TESTING | Facility: HOSPITAL | Age: 38
End: 2023-01-27
Payer: COMMERCIAL

## 2023-01-27 NOTE — TELEPHONE ENCOUNTER
Called and spoke with pt about the following:     Please arrive to [facility]) at 1215 pm on 1/30/23 for your scheduled procedure.  Address: [address] [place] [address] [place] [address] ([address] on left, 1st Floor Lobby)  >>>NO eating or drinking after midnight unless instructed otherwise by your Surgeon<<<    Thank you,  -[facility]  Mon-Fri 8 am - 4 pm [phone]

## 2023-01-30 ENCOUNTER — HOSPITAL ENCOUNTER (OUTPATIENT)
Facility: HOSPITAL | Age: 38
Discharge: HOME | End: 2023-01-30
Attending: SURGERY | Admitting: SURGERY
Payer: COMMERCIAL

## 2023-01-30 VITALS
DIASTOLIC BLOOD PRESSURE: 70 MMHG | HEIGHT: 64 IN | SYSTOLIC BLOOD PRESSURE: 129 MMHG | OXYGEN SATURATION: 100 % | WEIGHT: 229.94 LBS | BODY MASS INDEX: 39.26 KG/M2 | TEMPERATURE: 98 F | RESPIRATION RATE: 18 BRPM | HEART RATE: 77 BPM

## 2023-01-30 DIAGNOSIS — Z01.810 PREOP CARDIOVASCULAR EXAM: ICD-10-CM

## 2023-01-30 DIAGNOSIS — K43.0 RECURRENT INCISIONAL HERNIA WITH INCARCERATION: ICD-10-CM

## 2023-01-30 DIAGNOSIS — K43.2 INCISIONAL HERNIA, WITHOUT OBSTRUCTION OR GANGRENE: ICD-10-CM

## 2023-01-30 LAB
B-HCG UR QL: NEGATIVE
CTP QC/QA: YES

## 2023-01-30 PROCEDURE — 99499 NO LOS: ICD-10-PCS | Mod: ,,, | Performed by: SURGERY

## 2023-01-30 PROCEDURE — 99499 UNLISTED E&M SERVICE: CPT | Mod: ,,, | Performed by: SURGERY

## 2023-01-30 PROCEDURE — 93010 ELECTROCARDIOGRAM REPORT: CPT | Mod: ,,, | Performed by: STUDENT IN AN ORGANIZED HEALTH CARE EDUCATION/TRAINING PROGRAM

## 2023-01-30 PROCEDURE — 93010 EKG 12-LEAD: ICD-10-PCS | Mod: ,,, | Performed by: STUDENT IN AN ORGANIZED HEALTH CARE EDUCATION/TRAINING PROGRAM

## 2023-01-30 PROCEDURE — 81025 URINE PREGNANCY TEST: CPT | Performed by: SURGERY

## 2023-01-30 PROCEDURE — 93005 ELECTROCARDIOGRAM TRACING: CPT

## 2023-01-30 RX ORDER — KETOROLAC TROMETHAMINE 30 MG/ML
15 INJECTION, SOLUTION INTRAMUSCULAR; INTRAVENOUS EVERY 8 HOURS PRN
Status: CANCELLED | OUTPATIENT
Start: 2023-01-30 | End: 2023-02-01

## 2023-01-30 RX ORDER — ONDANSETRON 2 MG/ML
4 INJECTION INTRAMUSCULAR; INTRAVENOUS DAILY PRN
Status: CANCELLED | OUTPATIENT
Start: 2023-01-30

## 2023-01-30 RX ORDER — CEFAZOLIN SODIUM 2 G/50ML
2 SOLUTION INTRAVENOUS
Status: DISCONTINUED | OUTPATIENT
Start: 2023-01-30 | End: 2023-01-30 | Stop reason: HOSPADM

## 2023-01-30 RX ORDER — OXYCODONE AND ACETAMINOPHEN 5; 325 MG/1; MG/1
1 TABLET ORAL
Status: CANCELLED | OUTPATIENT
Start: 2023-01-30

## 2023-01-30 RX ORDER — DIPHENHYDRAMINE HYDROCHLORIDE 50 MG/ML
12.5 INJECTION INTRAMUSCULAR; INTRAVENOUS
Status: CANCELLED | OUTPATIENT
Start: 2023-01-30

## 2023-01-30 RX ORDER — HYDROMORPHONE HYDROCHLORIDE 2 MG/ML
0.2 INJECTION, SOLUTION INTRAMUSCULAR; INTRAVENOUS; SUBCUTANEOUS EVERY 5 MIN PRN
Status: CANCELLED | OUTPATIENT
Start: 2023-01-30

## 2023-04-19 ENCOUNTER — PATIENT MESSAGE (OUTPATIENT)
Dept: ADMINISTRATIVE | Facility: HOSPITAL | Age: 38
End: 2023-04-19
Payer: COMMERCIAL

## 2023-06-06 ENCOUNTER — TELEPHONE (OUTPATIENT)
Dept: PRIMARY CARE CLINIC | Facility: CLINIC | Age: 38
End: 2023-06-06
Payer: COMMERCIAL

## 2023-06-06 NOTE — TELEPHONE ENCOUNTER
----- Message from Joaquina Ferrer sent at 6/6/2023  8:45 AM CDT -----  Pt in waiting room,Please call if you to advise.[phone].Thanks

## 2023-07-05 ENCOUNTER — OFFICE VISIT (OUTPATIENT)
Dept: INTERNAL MEDICINE | Facility: CLINIC | Age: 38
End: 2023-07-05
Payer: COMMERCIAL

## 2023-07-05 DIAGNOSIS — I10 ESSENTIAL HYPERTENSION: ICD-10-CM

## 2023-07-05 DIAGNOSIS — T78.40XA ALLERGIC REACTION, INITIAL ENCOUNTER: Primary | ICD-10-CM

## 2023-07-05 PROCEDURE — 99213 PR OFFICE/OUTPT VISIT, EST, LEVL III, 20-29 MIN: ICD-10-PCS | Mod: 95,,, | Performed by: NURSE PRACTITIONER

## 2023-07-05 PROCEDURE — 1159F PR MEDICATION LIST DOCUMENTED IN MEDICAL RECORD: ICD-10-PCS | Mod: CPTII,95,, | Performed by: NURSE PRACTITIONER

## 2023-07-05 PROCEDURE — 1160F PR REVIEW ALL MEDS BY PRESCRIBER/CLIN PHARMACIST DOCUMENTED: ICD-10-PCS | Mod: CPTII,95,, | Performed by: NURSE PRACTITIONER

## 2023-07-05 PROCEDURE — 1160F RVW MEDS BY RX/DR IN RCRD: CPT | Mod: CPTII,95,, | Performed by: NURSE PRACTITIONER

## 2023-07-05 PROCEDURE — 1159F MED LIST DOCD IN RCRD: CPT | Mod: CPTII,95,, | Performed by: NURSE PRACTITIONER

## 2023-07-05 PROCEDURE — 99213 OFFICE O/P EST LOW 20 MIN: CPT | Mod: 95,,, | Performed by: NURSE PRACTITIONER

## 2023-07-05 RX ORDER — EPINEPHRINE 0.3 MG/.3ML
1 INJECTION SUBCUTANEOUS ONCE
Qty: 0.3 ML | Refills: 0 | Status: SHIPPED | OUTPATIENT
Start: 2023-07-05 | End: 2023-07-12

## 2023-07-05 NOTE — PROGRESS NOTES
Subjective     Patient ID: Raiza Mcpherson is a 38 y.o. female.    Chief Complaint: No chief complaint on file.    The patient location is: [place]  The chief complaint leading to consultation is: allergic reaction     Visit type: audiovisual    Face to Face time with patient: 14 minutes of total time spent on the encounter, which includes face to face time and non-face to face time preparing to see the patient (eg, review of tests), Obtaining and/or reviewing separately obtained history, Documenting clinical information in the electronic or other health record, Independently interpreting results (not separately reported) and communicating results to the patient/family/caregiver, or Care coordination (not separately reported).         Each patient to whom he or she provides medical services by telemedicine is:  (1) informed of the relationship between the physician and patient and the respective role of any other health care provider with respect to management of the patient; and (2) notified that he or she may decline to receive medical services by telemedicine and may withdraw from such care at any time.    Notes:        Patient presents for hospital follow up.  Reports having lip swelling about 4 days ago.  Went to [facility] ER and was given Benadryl and other medications through IV.  Happened before and had ACE Inhibitor discontinued.    Stopped her amlodipine.      No current symptoms.   Has BP cuff at home.  Advised to check BP.     Hypertension  This is a recurrent problem. The current episode started more than 1 year ago. The problem is unchanged. The problem is controlled. Pertinent negatives include no shortness of breath.   Review of Systems   Constitutional:  Negative for chills and fatigue.   Respiratory:  Negative for cough and shortness of breath.    Cardiovascular:  Negative for claudication.   Gastrointestinal:  Negative for abdominal pain, constipation and diarrhea.   Psychiatric/Behavioral:  Negative  for agitation and confusion.         Objective     Physical Exam  Pulmonary:      Effort: Pulmonary effort is normal. No respiratory distress.   Neurological:      General: No focal deficit present.      Mental Status: She is alert.   Psychiatric:         Mood and Affect: Mood normal.          Assessment and Plan     1. Allergic reaction, initial encounter  -     Ambulatory referral/consult to Allergy; Future; Expected date: 07/12/2023  -     EPINEPHrine (EPIPEN) 0.3 mg/0.3 mL AtIn; Inject 0.3 mLs (0.3 mg total) into the muscle once. for 1 dose  Dispense: 0.3 mL; Refill: 0    2. Essential hypertension  Comments:  Routinely check blood pressure at home.       Recommend that she see allergist.      Restart Norvasc.  Epipen sent to pharmacy.         No follow-ups on file.

## 2023-07-05 NOTE — Clinical Note
Needs appointment with allergist. Please schedule.  Also, schedule one month follow up with me/also to est care here.

## 2023-07-06 ENCOUNTER — PATIENT MESSAGE (OUTPATIENT)
Dept: INTERNAL MEDICINE | Facility: CLINIC | Age: 38
End: 2023-07-06
Payer: COMMERCIAL

## 2023-07-06 DIAGNOSIS — T78.40XD ALLERGIC REACTION, SUBSEQUENT ENCOUNTER: Primary | ICD-10-CM

## 2023-07-12 RX ORDER — EPINEPHRINE 0.3 MG/.3ML
2 INJECTION SUBCUTANEOUS ONCE
Qty: 0.6 ML | Refills: 0 | Status: SHIPPED | OUTPATIENT
Start: 2023-07-12 | End: 2024-01-29

## 2023-08-04 ENCOUNTER — OFFICE VISIT (OUTPATIENT)
Dept: INTERNAL MEDICINE | Facility: CLINIC | Age: 38
End: 2023-08-04
Payer: COMMERCIAL

## 2023-08-04 DIAGNOSIS — J06.9 VIRAL URI WITH COUGH: Primary | ICD-10-CM

## 2023-08-04 PROCEDURE — 1160F PR REVIEW ALL MEDS BY PRESCRIBER/CLIN PHARMACIST DOCUMENTED: ICD-10-PCS | Mod: CPTII,95,, | Performed by: NURSE PRACTITIONER

## 2023-08-04 PROCEDURE — 1159F PR MEDICATION LIST DOCUMENTED IN MEDICAL RECORD: ICD-10-PCS | Mod: CPTII,95,, | Performed by: NURSE PRACTITIONER

## 2023-08-04 PROCEDURE — 99213 PR OFFICE/OUTPT VISIT, EST, LEVL III, 20-29 MIN: ICD-10-PCS | Mod: 95,,, | Performed by: NURSE PRACTITIONER

## 2023-08-04 PROCEDURE — 1159F MED LIST DOCD IN RCRD: CPT | Mod: CPTII,95,, | Performed by: NURSE PRACTITIONER

## 2023-08-04 PROCEDURE — 99213 OFFICE O/P EST LOW 20 MIN: CPT | Mod: 95,,, | Performed by: NURSE PRACTITIONER

## 2023-08-04 PROCEDURE — 1160F RVW MEDS BY RX/DR IN RCRD: CPT | Mod: CPTII,95,, | Performed by: NURSE PRACTITIONER

## 2023-08-04 RX ORDER — PROMETHAZINE HYDROCHLORIDE AND DEXTROMETHORPHAN HYDROBROMIDE 6.25; 15 MG/5ML; MG/5ML
5 SYRUP ORAL NIGHTLY PRN
Qty: 120 ML | Refills: 0 | Status: SHIPPED | OUTPATIENT
Start: 2023-08-04 | End: 2024-01-29

## 2023-08-04 RX ORDER — FLUTICASONE PROPIONATE 50 MCG
2 SPRAY, SUSPENSION (ML) NASAL DAILY
Qty: 16 G | Refills: 0 | Status: SHIPPED | OUTPATIENT
Start: 2023-08-04 | End: 2024-02-06

## 2023-08-04 NOTE — PROGRESS NOTES
Subjective:       Patient ID: Raiza Mcpherson is a 38 y.o. female.    Chief Complaint: URI    The patient location is: home  The chief complaint leading to consultation is: uri    Visit type: audiovisual    Face to Face time with patient: 15 min  20 minutes of total time spent on the encounter, which includes face to face time and non-face to face time preparing to see the patient (eg, review of tests), Obtaining and/or reviewing separately obtained history, Documenting clinical information in the electronic or other health record, Independently interpreting results (not separately reported) and communicating results to the patient/family/caregiver, or Care coordination (not separately reported).         Each patient to whom he or she provides medical services by telemedicine is:  (1) informed of the relationship between the physician and patient and the respective role of any other health care provider with respect to management of the patient; and (2) notified that he or she may decline to receive medical services by telemedicine and may withdraw from such care at any time.    Notes:     Patient doing virtual for Upper Respiratory Infection that started yesterday   Has dry cough and sore throat  Cough started today  No fever  No body aches  Feels pain to middle back  No urinary symptoms   No sinus pain  Pain 6/10  She is taking nyquil and dayquil with no improvement    Son recently started  and has same symptoms    URI   Associated symptoms include congestion, coughing, headaches, neck pain, a plugged ear sensation and a sore throat. Pertinent negatives include no abdominal pain, diarrhea, ear pain, swollen glands or vomiting.   Sore Throat   This is a new problem. The current episode started yesterday. The problem has been unchanged. The pain is worse on the left side. There has been no fever. The pain is at a severity of 6/10. The pain is moderate. Associated symptoms include congestion, coughing,  headaches, a plugged ear sensation, neck pain and trouble swallowing. Pertinent negatives include no abdominal pain, diarrhea, drooling, ear discharge, ear pain, hoarse voice, shortness of breath, stridor, swollen glands or vomiting. She has had no exposure to strep or mono.       LMP 06/26/2023 (Approximate)     Review of Systems   HENT:  Positive for congestion, sore throat and trouble swallowing. Negative for drooling, ear discharge, ear pain and hoarse voice.    Respiratory:  Positive for cough. Negative for shortness of breath and stridor.    Gastrointestinal:  Negative for abdominal pain, diarrhea and vomiting.   Musculoskeletal:  Positive for neck pain.   Neurological:  Positive for headaches.       Objective:      Physical Exam  Constitutional:       General: She is not in acute distress.     Appearance: Normal appearance. She is well-developed. She is not diaphoretic.   HENT:      Head: Normocephalic and atraumatic.      Right Ear: External ear normal.      Left Ear: External ear normal.   Eyes:      General: No scleral icterus.        Right eye: No discharge.         Left eye: No discharge.      Conjunctiva/sclera: Conjunctivae normal.   Pulmonary:      Effort: Pulmonary effort is normal. No tachypnea, accessory muscle usage or respiratory distress.      Breath sounds: No stridor.   Musculoskeletal:      Cervical back: Normal range of motion and neck supple.   Skin:     Findings: No rash.   Neurological:      Mental Status: She is alert. She is not disoriented.   Psychiatric:         Attention and Perception: She is attentive.         Mood and Affect: Mood normal. Mood is not anxious or depressed. Affect is not labile, blunt, angry or inappropriate.         Speech: Speech normal.         Behavior: Behavior normal.         Thought Content: Thought content normal.         Judgment: Judgment normal.         Assessment:       1. Viral URI with cough        Plan:       Raiza was seen today for  uri.    Diagnoses and all orders for this visit:    Viral URI with cough  -     promethazine-dextromethorphan (PROMETHAZINE-DM) 6.25-15 mg/5 mL Syrp; Take 5 mLs by mouth nightly as needed (Cough).  -     fluticasone propionate (FLONASE) 50 mcg/actuation nasal spray; 2 sprays (100 mcg total) by Each Nostril route once daily.      Rest  Drink plenty of clear fluids  Nasal saline spray to clear nasal drainage and help with nasal congestion  Zyrtec or Claritin to help dry mucus and post nasal drip  Mucinex or Mucinex DM for cough and chest congestion  Tylenol or Ibuprofen for fever, headache and body aches  Warm salt water gargles for throat comfort  Chloraseptic spray or lozenges for throat comfort  See Primary Care Physician or go to ER if symptoms worsen of fail to improve with treatment.

## 2023-09-01 ENCOUNTER — OFFICE VISIT (OUTPATIENT)
Dept: INTERNAL MEDICINE | Facility: CLINIC | Age: 38
End: 2023-09-01
Payer: COMMERCIAL

## 2023-09-01 DIAGNOSIS — T75.3XXA MOTION SICKNESS, INITIAL ENCOUNTER: Primary | ICD-10-CM

## 2023-09-01 PROCEDURE — 1160F PR REVIEW ALL MEDS BY PRESCRIBER/CLIN PHARMACIST DOCUMENTED: ICD-10-PCS | Mod: CPTII,95,, | Performed by: FAMILY MEDICINE

## 2023-09-01 PROCEDURE — 99212 PR OFFICE/OUTPT VISIT, EST, LEVL II, 10-19 MIN: ICD-10-PCS | Mod: 95,,, | Performed by: FAMILY MEDICINE

## 2023-09-01 PROCEDURE — 1159F PR MEDICATION LIST DOCUMENTED IN MEDICAL RECORD: ICD-10-PCS | Mod: CPTII,95,, | Performed by: FAMILY MEDICINE

## 2023-09-01 PROCEDURE — 1159F MED LIST DOCD IN RCRD: CPT | Mod: CPTII,95,, | Performed by: FAMILY MEDICINE

## 2023-09-01 PROCEDURE — 99212 OFFICE O/P EST SF 10 MIN: CPT | Mod: 95,,, | Performed by: FAMILY MEDICINE

## 2023-09-01 PROCEDURE — 1160F RVW MEDS BY RX/DR IN RCRD: CPT | Mod: CPTII,95,, | Performed by: FAMILY MEDICINE

## 2023-09-01 RX ORDER — SCOLOPAMINE TRANSDERMAL SYSTEM 1 MG/1
1 PATCH, EXTENDED RELEASE TRANSDERMAL
Qty: 3 PATCH | Refills: 0 | Status: SHIPPED | OUTPATIENT
Start: 2023-09-01 | End: 2024-01-29

## 2023-09-01 NOTE — PROGRESS NOTES
The patient location is: home  The chief complaint leading to consultation is: motion sickness    Visit type: audiovisual    Face to Face time with patient: 10  15 minutes of total time spent on the encounter, which includes face to face time and non-face to face time preparing to see the patient (eg, review of tests), Obtaining and/or reviewing separately obtained history, Documenting clinical information in the electronic or other health record, Independently interpreting results (not separately reported) and communicating results to the patient/family/caregiver, or Care coordination (not separately reported).         Each patient to whom he or she provides medical services by telemedicine is:  (1) informed of the relationship between the physician and patient and the respective role of any other health care provider with respect to management of the patient; and (2) notified that he or she may decline to receive medical services by telemedicine and may withdraw from such care at any time.    Notes:  Patient requesting scopolamine patches. She has history of motion sickness and is flying out tomorrow and returning on Monday. Pharmacy verified with patient and I agreed to send patches for her to use.

## 2023-09-26 ENCOUNTER — OFFICE VISIT (OUTPATIENT)
Dept: INTERNAL MEDICINE | Facility: CLINIC | Age: 38
End: 2023-09-26
Payer: COMMERCIAL

## 2023-09-26 DIAGNOSIS — L29.9 ITCHING: Primary | ICD-10-CM

## 2023-09-26 DIAGNOSIS — R21 RASH: ICD-10-CM

## 2023-09-26 PROCEDURE — 1160F RVW MEDS BY RX/DR IN RCRD: CPT | Mod: CPTII,95,, | Performed by: NURSE PRACTITIONER

## 2023-09-26 PROCEDURE — 1159F MED LIST DOCD IN RCRD: CPT | Mod: CPTII,95,, | Performed by: NURSE PRACTITIONER

## 2023-09-26 PROCEDURE — 1159F PR MEDICATION LIST DOCUMENTED IN MEDICAL RECORD: ICD-10-PCS | Mod: CPTII,95,, | Performed by: NURSE PRACTITIONER

## 2023-09-26 PROCEDURE — 99213 PR OFFICE/OUTPT VISIT, EST, LEVL III, 20-29 MIN: ICD-10-PCS | Mod: 95,,, | Performed by: NURSE PRACTITIONER

## 2023-09-26 PROCEDURE — 99213 OFFICE O/P EST LOW 20 MIN: CPT | Mod: 95,,, | Performed by: NURSE PRACTITIONER

## 2023-09-26 PROCEDURE — 1160F PR REVIEW ALL MEDS BY PRESCRIBER/CLIN PHARMACIST DOCUMENTED: ICD-10-PCS | Mod: CPTII,95,, | Performed by: NURSE PRACTITIONER

## 2023-09-26 NOTE — PROGRESS NOTES
Subjective:       Patient ID: Raiza Mcpherson is a 38 y.o. female.    Chief Complaint: Rash    The patient location is: home  The chief complaint leading to consultation is: rash    Visit type: audiovisual    Face to Face time with patient: 15 min  20 minutes of total time spent on the encounter, which includes face to face time and non-face to face time preparing to see the patient (eg, review of tests), Obtaining and/or reviewing separately obtained history, Documenting clinical information in the electronic or other health record, Independently interpreting results (not separately reported) and communicating results to the patient/family/caregiver, or Care coordination (not separately reported).         Each patient to whom he or she provides medical services by telemedicine is:  (1) informed of the relationship between the physician and patient and the respective role of any other health care provider with respect to management of the patient; and (2) notified that he or she may decline to receive medical services by telemedicine and may withdraw from such care at any time.    Notes:     Patient doing virtual for a rash  Comes and goes, on all areas of body  There have been no new lotions, soaps, detergents, perfumes, foods, or medications. No recent antibiotic use.   Has hx of eczema; this feels different  When she scratches, it creates welps  Stressed - son playing college ball    Rash  Pertinent negatives include no diarrhea, rhinorrhea or vomiting.       There were no vitals taken for this visit.    Review of Systems   Constitutional:  Negative for activity change and unexpected weight change.   HENT:  Negative for hearing loss, rhinorrhea and trouble swallowing.    Eyes:  Negative for discharge and visual disturbance.   Respiratory:  Negative for chest tightness and wheezing.    Cardiovascular:  Negative for chest pain and palpitations.   Gastrointestinal:  Negative for blood in stool, constipation,  diarrhea and vomiting.   Endocrine: Negative for polydipsia and polyuria.   Genitourinary:  Negative for difficulty urinating, dysuria, hematuria and menstrual problem.   Musculoskeletal:  Negative for arthralgias, joint swelling and neck pain.   Skin:  Positive for rash.   Neurological:  Negative for weakness and headaches.   Psychiatric/Behavioral:  Negative for confusion and dysphoric mood.        Objective:      Physical Exam  Constitutional:       General: She is not in acute distress.     Appearance: Normal appearance. She is well-developed. She is not diaphoretic.   HENT:      Head: Normocephalic and atraumatic.      Right Ear: External ear normal.      Left Ear: External ear normal.   Eyes:      General: No scleral icterus.        Right eye: No discharge.         Left eye: No discharge.      Conjunctiva/sclera: Conjunctivae normal.   Pulmonary:      Effort: Pulmonary effort is normal. No tachypnea, accessory muscle usage or respiratory distress.      Breath sounds: No stridor.   Musculoskeletal:      Cervical back: Normal range of motion and neck supple.   Skin:     Findings: No rash.   Neurological:      Mental Status: She is alert. She is not disoriented.   Psychiatric:         Attention and Perception: She is attentive.         Mood and Affect: Mood normal. Mood is not anxious or depressed. Affect is not labile, blunt, angry or inappropriate.         Speech: Speech normal.         Behavior: Behavior normal.         Thought Content: Thought content normal.         Judgment: Judgment normal.         Assessment:       1. Itching    2. Rash        Plan:       Raiza was seen today for rash.    Diagnoses and all orders for this visit:    Itching  -     Ambulatory referral/consult to Dermatology; Future    Rash  -     Ambulatory referral/consult to Dermatology; Future      Patient Instructions   Pepcid 20mg twice a day  Zyrtec in am  Benadryl in pm  See dermatology for further evaluation and management

## 2023-09-26 NOTE — PATIENT INSTRUCTIONS
Pepcid 20mg twice a day  Zyrtec in am  Benadryl in pm  See dermatology for further evaluation and management

## 2023-10-03 ENCOUNTER — OFFICE VISIT (OUTPATIENT)
Dept: DERMATOLOGY | Facility: CLINIC | Age: 38
End: 2023-10-03
Payer: COMMERCIAL

## 2023-10-03 DIAGNOSIS — L29.9 ITCHING: ICD-10-CM

## 2023-10-03 DIAGNOSIS — R21 RASH: ICD-10-CM

## 2023-10-03 DIAGNOSIS — L29.9 PRURITUS: Primary | ICD-10-CM

## 2023-10-03 PROCEDURE — 99999 PR PBB SHADOW E&M-EST. PATIENT-LVL IV: CPT | Mod: PBBFAC,,, | Performed by: DERMATOLOGY

## 2023-10-03 PROCEDURE — 99203 OFFICE O/P NEW LOW 30 MIN: CPT | Mod: S$GLB,,, | Performed by: DERMATOLOGY

## 2023-10-03 PROCEDURE — 99203 PR OFFICE/OUTPT VISIT, NEW, LEVL III, 30-44 MIN: ICD-10-PCS | Mod: S$GLB,,, | Performed by: DERMATOLOGY

## 2023-10-03 PROCEDURE — 1160F PR REVIEW ALL MEDS BY PRESCRIBER/CLIN PHARMACIST DOCUMENTED: ICD-10-PCS | Mod: CPTII,S$GLB,, | Performed by: DERMATOLOGY

## 2023-10-03 PROCEDURE — 1160F RVW MEDS BY RX/DR IN RCRD: CPT | Mod: CPTII,S$GLB,, | Performed by: DERMATOLOGY

## 2023-10-03 PROCEDURE — 1159F MED LIST DOCD IN RCRD: CPT | Mod: CPTII,S$GLB,, | Performed by: DERMATOLOGY

## 2023-10-03 PROCEDURE — 99999 PR PBB SHADOW E&M-EST. PATIENT-LVL IV: ICD-10-PCS | Mod: PBBFAC,,, | Performed by: DERMATOLOGY

## 2023-10-03 PROCEDURE — 3044F HG A1C LEVEL LT 7.0%: CPT | Mod: CPTII,S$GLB,, | Performed by: DERMATOLOGY

## 2023-10-03 PROCEDURE — 3044F PR MOST RECENT HEMOGLOBIN A1C LEVEL <7.0%: ICD-10-PCS | Mod: CPTII,S$GLB,, | Performed by: DERMATOLOGY

## 2023-10-03 PROCEDURE — 1159F PR MEDICATION LIST DOCUMENTED IN MEDICAL RECORD: ICD-10-PCS | Mod: CPTII,S$GLB,, | Performed by: DERMATOLOGY

## 2023-10-03 RX ORDER — HYDROXYZINE HYDROCHLORIDE 10 MG/1
10 TABLET, FILM COATED ORAL 3 TIMES DAILY PRN
Qty: 30 TABLET | Refills: 1 | Status: SHIPPED | OUTPATIENT
Start: 2023-10-03 | End: 2023-11-02

## 2023-10-03 NOTE — PROGRESS NOTES
Subjective:      Patient ID:  Raiza Mcpherson is a 38 y.o. female who presents for   Chief Complaint   Patient presents with    Itching     Pt is present for itching throughout body.  Pt states that she has eczema,but she doesn't think the itching is from that     History of Present Illness: The patient presents with chief complaint of itching.  Location: whole body  Duration: 1 months  Signs/Symptoms: itching; sometimes after she scratches she gets linear whelps; sometimes she gets bumps after scratching;  these resolve within 24 hours;  cannot ID a trigger - denies photosensitivity, cannot ID any potential trigger; no new exposures    Prior treatments:   Pepcid- 9/26 - no change  Zyrtec daily - has been taking long term  Benadryl- makes her too sleepy      has h/o eczema, this feels different. Reports she has had eczema since childhood, usually affects face, creases of skin, back.  + seasonal allergies    Denies + contacts with rash    Denies new meds. Has been on current meds (OCP cryselle, norvasc, HCTZ) for over a year. Denies NSAIDs/ibuprofen/aleve/tylenol    No h/o smoking. + h/o asthma            Review of Systems   Constitutional:  Positive for fatigue. Negative for fever and weight loss.   Skin:  Positive for itching and rash.       Objective:   Physical Exam   Constitutional: She appears well-developed and well-nourished. No distress.   Neurological: She is alert and oriented to person, place, and time. She is not disoriented.   Psychiatric: She has a normal mood and affect.   Skin:   Areas Examined (abnormalities noted in diagram):   Scalp / Hair Palpated and Inspected  Head / Face Inspection Performed  Neck Inspection Performed  Chest / Axilla Inspection Performed  Abdomen Inspection Performed  Back Inspection Performed  RUE Inspected  LUE Inspection Performed  RLE Inspected  LLE Inspection Performed  Nails and Digits Inspection Performed            Diagram Legend     Erythematous scaling  macule/papule c/w actinic keratosis       Vascular papule c/w angioma      Pigmented verrucoid papule/plaque c/w seborrheic keratosis      Yellow umbilicated papule c/w sebaceous hyperplasia      Irregularly shaped tan macule c/w lentigo     1-2 mm smooth white papules consistent with Milia      Movable subcutaneous cyst with punctum c/w epidermal inclusion cyst      Subcutaneous movable cyst c/w pilar cyst      Firm pink to brown papule c/w dermatofibroma      Pedunculated fleshy papule(s) c/w skin tag(s)      Evenly pigmented macule c/w junctional nevus     Mildly variegated pigmented, slightly irregular-bordered macule c/w mildly atypical nevus      Flesh colored to evenly pigmented papule c/w intradermal nevus       Pink pearly papule/plaque c/w basal cell carcinoma      Erythematous hyperkeratotic cursted plaque c/w SCC      Surgical scar with no sign of skin cancer recurrence      Open and closed comedones      Inflammatory papules and pustules      Verrucoid papule consistent consistent with wart     Erythematous eczematous patches and plaques     Dystrophic onycholytic nail with subungual debris c/w onychomycosis     Umbilicated papule    Erythematous-base heme-crusted tan verrucoid plaque consistent with inflamed seborrheic keratosis     Erythematous Silvery Scaling Plaque c/w Psoriasis     See annotation      Assessment / Plan:        Pruritus  - lab eval as below  - trial of sensitive skin / dry skin care regimen (CeraVe cream BID; Dove for sensitive skin soap or CeraVe bar soap) + urticarial dosing of nonsedating antihistamines as below + atarax  - if refractory, consider CXR  -     TSH; Future; Expected date: 10/03/2023  -     CBC Auto Differential; Future; Expected date: 10/03/2023  -     COMPREHENSIVE METABOLIC PANEL; Future; Expected date: 10/03/2023  -     HIV 1/2 Ag/Ab (4th Gen); Future; Expected date: 10/03/2023  -     Hepatitis C Antibody; Future; Expected date: 10/03/2023  -     hydrOXYzine HCL  (ATARAX) 10 MG Tab; Take 1 tablet (10 mg total) by mouth 3 (three) times daily as needed (itching).  Dispense: 30 tablet; Refill: 1  Discussed r/b/ae including drowsiness    -OTC: Start Allegra 180 mg qam and Xyzal 5 mg qpm. After 3 days increase to 2 tablets of Allegra in the morning and continue Xyzal 5 mg qpm.  After 3 days, continue 2 tablets of Allegra in the morning and increase to 2 tablets of Xyzal at night (Allegra 360 mg qam and Xyzal 10 mg qpm).    - Sarna Sensitive (pramoxine)  - hydroxyzine  - Avoid NSAIDS if possible  -continue pepcid for now           Follow up in about 8 weeks (around 11/28/2023), or if symptoms worsen or fail to improve.

## 2023-10-03 NOTE — PATIENT INSTRUCTIONS
-OTC: Start Allegra 180 mg qam and Xyzal 5 mg qpm. After 3 days increase to 2 tablets of Allegra in the morning and continue Xyzal 5 mg qpm.  After 3 days, continue 2 tablets of Allegra in the morning and increase to 2 tablets of Xyzal at night (Allegra 360 mg qam and Xyzal 10 mg qpm).    - Sarna Sensitive (pramoxine)  - hydroxyzine  - Avoid NSAIDS if possible  -continue pepcid for now            XEROSIS (DRY SKIN)        Definition    Xerosis is the term for dry skin.  We all have a natural oil coating over our skin produced by the skin oil glands.  If this oil is removed, the skin becomes dry which can lead to cracking, which can lead to inflammation.  Xerosis is usually a long-term problem that recurs often, especially in the winter.    Cause    Long hot baths or showers can remove our natural oil and lead to xerosis.  One should never take more than one bath or shower a day and for no longer than ten minutes.  Use of harsh soaps such as Zest, Dial, and Ivory can worsen and cause xerosis.  Cold winter weather worsens xerosis because the amount of moisture contained in cold air is much less than the amount of moisture in warm air.    Treatment    Treatment is intended to restore the natural oil to your skin.  Keep the skin lubricated.    Do not take more than one bath or shower a day.  Use lukewarm water, not hot.  Hot water dries out the skin.    Use a gentle moisturizing soap such as Cetaphil soap, Oil of Olay, Dove, Basis, Ivory moisture care, Restoraderm cleanser.    When toweling dry, dont rub.  Blot the skin so there is still some water left on the skin.  You should apply a moisturizing cream to all of the skin such as Cerave cream, Cetaphil cream, Restoraderm or Eucerin Original Formula cream.   Alpha hydroxyacid lotions, i.e., AmLactin, also work very well for preventing dry skin, but may burn when used on inflamed or reddened skin.    If you like to swim during the winter months, you should not use soap  when getting out of the pool.  When you have finished swimming, rinse off the chlorine with cool to warm water.  If this will be the only shower of the day, then you may use Cetaphil or another mild soap to cleanse your skin.  After the shower, apply a moisturizing cream to all of the skin as above.        [address] [place] [address]/ [phone] FAX/ [email or web address]

## 2023-11-07 ENCOUNTER — OFFICE VISIT (OUTPATIENT)
Dept: INTERNAL MEDICINE | Facility: CLINIC | Age: 38
End: 2023-11-07
Payer: COMMERCIAL

## 2023-11-07 DIAGNOSIS — T78.40XA ALLERGIC REACTION, INITIAL ENCOUNTER: Primary | ICD-10-CM

## 2023-11-07 PROCEDURE — 1159F PR MEDICATION LIST DOCUMENTED IN MEDICAL RECORD: ICD-10-PCS | Mod: CPTII,95,, | Performed by: NURSE PRACTITIONER

## 2023-11-07 PROCEDURE — 1159F MED LIST DOCD IN RCRD: CPT | Mod: CPTII,95,, | Performed by: NURSE PRACTITIONER

## 2023-11-07 PROCEDURE — 3044F PR MOST RECENT HEMOGLOBIN A1C LEVEL <7.0%: ICD-10-PCS | Mod: CPTII,95,, | Performed by: NURSE PRACTITIONER

## 2023-11-07 PROCEDURE — 99213 PR OFFICE/OUTPT VISIT, EST, LEVL III, 20-29 MIN: ICD-10-PCS | Mod: 95,,, | Performed by: NURSE PRACTITIONER

## 2023-11-07 PROCEDURE — 1160F PR REVIEW ALL MEDS BY PRESCRIBER/CLIN PHARMACIST DOCUMENTED: ICD-10-PCS | Mod: CPTII,95,, | Performed by: NURSE PRACTITIONER

## 2023-11-07 PROCEDURE — 99213 OFFICE O/P EST LOW 20 MIN: CPT | Mod: 95,,, | Performed by: NURSE PRACTITIONER

## 2023-11-07 PROCEDURE — 3044F HG A1C LEVEL LT 7.0%: CPT | Mod: CPTII,95,, | Performed by: NURSE PRACTITIONER

## 2023-11-07 PROCEDURE — 1160F RVW MEDS BY RX/DR IN RCRD: CPT | Mod: CPTII,95,, | Performed by: NURSE PRACTITIONER
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
## 2024-02-06 ENCOUNTER — OFFICE VISIT (OUTPATIENT)
Dept: FAMILY MEDICINE | Facility: CLINIC | Age: 39
End: 2024-02-06
Payer: COMMERCIAL

## 2024-02-06 VITALS
TEMPERATURE: 99 F | HEIGHT: 64 IN | DIASTOLIC BLOOD PRESSURE: 76 MMHG | SYSTOLIC BLOOD PRESSURE: 118 MMHG | HEART RATE: 82 BPM | BODY MASS INDEX: 40.98 KG/M2 | WEIGHT: 240.06 LBS | OXYGEN SATURATION: 98 % | RESPIRATION RATE: 18 BRPM

## 2024-02-06 DIAGNOSIS — R73.03 PREDIABETES: ICD-10-CM

## 2024-02-06 DIAGNOSIS — Z00.00 PREVENTATIVE HEALTH CARE: Primary | ICD-10-CM

## 2024-02-06 DIAGNOSIS — L68.0 HIRSUTISM: ICD-10-CM

## 2024-02-06 DIAGNOSIS — E66.01 MORBID OBESITY WITH BMI OF 40.0-44.9, ADULT: ICD-10-CM

## 2024-02-06 DIAGNOSIS — N92.6 IRREGULAR MENSTRUAL CYCLE: ICD-10-CM

## 2024-02-06 DIAGNOSIS — I10 ESSENTIAL HYPERTENSION: ICD-10-CM

## 2024-02-06 DIAGNOSIS — L83 ACANTHOSIS NIGRICANS: ICD-10-CM

## 2024-02-06 PROCEDURE — 99395 PREV VISIT EST AGE 18-39: CPT | Mod: S$GLB,,, | Performed by: REGISTERED NURSE

## 2024-02-06 PROCEDURE — 99999 PR PBB SHADOW E&M-EST. PATIENT-LVL III: CPT | Mod: PBBFAC,,, | Performed by: REGISTERED NURSE

## 2024-02-06 PROCEDURE — 3008F BODY MASS INDEX DOCD: CPT | Mod: CPTII,S$GLB,, | Performed by: REGISTERED NURSE

## 2024-02-06 PROCEDURE — 3078F DIAST BP <80 MM HG: CPT | Mod: CPTII,S$GLB,, | Performed by: REGISTERED NURSE

## 2024-02-06 PROCEDURE — 3074F SYST BP LT 130 MM HG: CPT | Mod: CPTII,S$GLB,, | Performed by: REGISTERED NURSE

## 2024-02-06 PROCEDURE — 1159F MED LIST DOCD IN RCRD: CPT | Mod: CPTII,S$GLB,, | Performed by: REGISTERED NURSE

## 2024-02-06 RX ORDER — HYDROCHLOROTHIAZIDE 25 MG/1
25 TABLET ORAL DAILY
Qty: 90 TABLET | Refills: 1 | Status: SHIPPED | OUTPATIENT
Start: 2024-02-06

## 2024-02-06 RX ORDER — AMLODIPINE BESYLATE 5 MG/1
5 TABLET ORAL DAILY
Qty: 90 TABLET | Refills: 1 | Status: SHIPPED | OUTPATIENT
Start: 2024-02-06

## 2024-02-06 NOTE — PROGRESS NOTES
SUBJECTIVE:     Raiza Mcpherson is a 38 y.o. female, here today for:   PREVENTATIVE HEALTH EXAM    HPI:    Raiza Mcpherson has not fasted to have bloodwork done today.  I have reviewed the patient's medical history in detail and updated the computerized patient record.  History obtained from the patient.    HEALTHCARE MAINTENANCE:    COMPLETED:  Health Maintenance Topics with due status: Not Due       Topic Last Completion Date    Hemoglobin A1c (Diabetic Prevention Screening) 07/21/2023       DUE:  Health Maintenance Due   Topic Date Due    Pneumococcal Vaccines (Age 0-64) (1 of 2 - PCV) Never done    TETANUS VACCINE  Never done    Cervical Cancer Screening  11/14/2021    Influenza Vaccine (1) Never done    COVID-19 Vaccine (3 - 2023-24 season) 09/01/2023         REVIEW OF SYSTEMS:    Review of Systems   Constitutional:  Positive for unexpected weight change (gain). Negative for activity change, appetite change, chills, diaphoresis, fatigue and fever.        Wt Readings from Last 3 Encounters:  02/06/24 : 108.9 kg (240 lb 1.3 oz)  01/30/23 : 104.3 kg (229 lb 15 oz)  01/09/23 : 104.1 kg (229 lb 8 oz)  Admits to getting away from healthy diet/lifestyle changes, was previously following a regular exercise program.  Weight gain over past year noted.   HENT: Negative.     Eyes:  Negative for discharge and visual disturbance.   Respiratory:  Negative for cough, shortness of breath and wheezing.    Cardiovascular:  Negative for chest pain, palpitations and leg swelling.        HTN ---- on medication as ordered, no home checks.  Increased sodium.   Gastrointestinal: Negative.    Genitourinary:  Positive for menstrual problem (irreg cycles despite OCP).   Musculoskeletal: Negative.    Integumentary:  Negative for rash and mole/lesion.   Neurological:  Negative for vertigo, seizures, syncope, numbness and headaches.   Hematological: Negative.    Psychiatric/Behavioral:  Negative for depression and sleep  disturbance. The patient is not nervous/anxious.    Breast: negative.        ALLERGIES:  Review of patient's allergies indicates:   Allergen Reactions    Ace inhibitors Swelling    Nsaids (non-steroidal anti-inflammatory drug)      Angioedema    Tylenol [acetaminophen]      Angioedema       CURRENT PROBLEM LIST:  Patient Active Problem List   Diagnosis    Morbid obesity with BMI of 40.0-44.9, adult    Essential hypertension    Mild intermittent asthma without complication    Seasonal allergic rhinitis due to pollen    Eczema    Prediabetes    Irregular menstrual cycle       CURRENT MEDICATIONS:    Current Outpatient Medications:     albuterol (PROVENTIL) 2.5 mg /3 mL (0.083 %) nebulizer solution, Take 3 mLs (2.5 mg total) by nebulization every 6 (six) hours as needed for Wheezing or Shortness of Breath. Rescue, Disp: 1 each, Rfl: 5    albuterol (PROVENTIL/VENTOLIN HFA) 90 mcg/actuation inhaler, Inhale 1-2 puffs into the lungs every 6 (six) hours as needed for Wheezing (cough)., Disp: 6.7 g, Rfl: 1    CRYSELLE, 28, 0.3-30 mg-mcg per tablet, TAKE 1 TABLET BY MOUTH EVERY DAY, Disp: 28 tablet, Rfl: 5    amLODIPine (NORVASC) 5 MG tablet, Take 1 tablet (5 mg total) by mouth once daily., Disp: 90 tablet, Rfl: 1    hydroCHLOROthiazide (HYDRODIURIL) 25 MG tablet, Take 1 tablet (25 mg total) by mouth once daily., Disp: 90 tablet, Rfl: 1      HISTORY:    PAST MEDICAL HISTORY:  Past Medical History:   Diagnosis Date    Asthma     childhood    Hypertension        PAST SURGICAL HISTORY:  Past Surgical History:   Procedure Laterality Date    EXCISION OF MASS OF ABDOMEN N/A 11/27/2019    Procedure: EXCISION, MASS, ABDOMEN;  Surgeon: Angie Pagan MD;  Location: [facility];  Service: General;  Laterality: N/A;  XI ROBOTIC      ROBOT-ASSISTED CHOLECYSTECTOMY USING DA PHIL XI N/A 11/25/2020    Procedure: XI ROBOTIC CHOLECYSTECTOMY;  Surgeon: Angie Pagan MD;  Location: [facility] OR;  Service: General;  Laterality: N/A;     ROBOT-ASSISTED REPAIR OF VENTRAL HERNIA USING DA PHIL XI N/A 11/27/2019    Procedure: XI ROBOTIC REPAIR, HERNIA, VENTRAL;  Surgeon: Angie Pagan MD;  Location: [facility];  Service: General;  Laterality: N/A;       FAMILY HISTORY:  Family History   Problem Relation Age of Onset    Hypertension Father     Kidney disease Paternal Grandmother     Cancer Paternal Grandmother     Kidney disease Paternal Grandfather     Vision loss Paternal Grandfather     Hypertension Mother        SOCIAL HISTORY:  Social History     Socioeconomic History    Marital status: Single    Number of children: 3   Occupational History    Occupation: Crime victim asst     Employer: [employer]   Tobacco Use    Smoking status: Never    Smokeless tobacco: Never   Substance and Sexual Activity    Alcohol use: No    Drug use: No    Sexual activity: Not Currently     Social Determinants of Health     Financial Resource Strain: Medium Risk (10/3/2023)    Overall Financial Resource Strain (CARDIA)     Difficulty of Paying Living Expenses: Somewhat hard   Food Insecurity: Patient Declined (10/3/2023)    Hunger Vital Sign     Worried About Running Out of Food in the Last Year: Patient declined     Ran Out of Food in the Last Year: Patient declined   Transportation Needs: Patient Declined (10/3/2023)    PRAPARE - Transportation     Lack of Transportation (Medical): Patient declined     Lack of Transportation (Non-Medical): Patient declined   Physical Activity: Insufficiently Active (10/3/2023)    Exercise Vital Sign     Days of Exercise per Week: 4 days     Minutes of Exercise per Session: 30 min   Stress: Stress Concern Present (10/3/2023)    [employer] [place] [employer] - Occupational Stress Questionnaire     Feeling of Stress : Very much   Social Connections: Unknown (10/3/2023)    Social Connection and Isolation Panel [NHANES]     Frequency of Communication with Friends and Family: More than three times a week      "Frequency of Social Gatherings with Friends and Family: More than three times a week     Active Member of Clubs or Organizations: Yes     Attends Club or Organization Meetings: More than 4 times per year     Marital Status:    Housing Stability: Unknown (2/15/2022)    Housing Stability Vital Sign     Unstable Housing in the Last Year: Patient refused         OBJECTIVE:     VITAL SIGNS:  Vitals:    02/06/24 1109   BP: 118/76   Pulse: 82   Resp: 18   Temp: 98.6 °F (37 °C)   TempSrc: Oral   SpO2: 98%   Weight: 108.9 kg (240 lb 1.3 oz)   Height: 5' 4" (1.626 m)       CURRENT BMI:   Body mass index is 41.21 kg/m².    PHYSICAL EXAM:  Physical Exam  Constitutional:       General: She is not in acute distress.     Appearance: She is well-developed. She is not diaphoretic.   HENT:      Head: Normocephalic and atraumatic.      Right Ear: Tympanic membrane, ear canal and external ear normal. There is no impacted cerumen.      Left Ear: Tympanic membrane, ear canal and external ear normal. There is no impacted cerumen.      Nose: Nose normal. No nasal deformity, mucosal edema, congestion or rhinorrhea.      Mouth/Throat:      Mouth: Mucous membranes are moist. Mucous membranes are not dry and not cyanotic. No oral lesions.      Dentition: Normal dentition.      Pharynx: Oropharynx is clear. Uvula midline. No oropharyngeal exudate, posterior oropharyngeal erythema or uvula swelling.      Tonsils: No tonsillar abscesses.   Eyes:      General: Lids are normal. Lids are everted, no foreign bodies appreciated. No scleral icterus.        Right eye: No discharge.         Left eye: No discharge.      Conjunctiva/sclera: Conjunctivae normal.      Right eye: Right conjunctiva is not injected. No exudate.     Left eye: Left conjunctiva is not injected. No exudate.     Pupils: Pupils are equal, round, and reactive to light.   Neck:      Thyroid: No thyroid mass or thyromegaly.      Vascular: No carotid bruit or JVD.      Trachea: No " tracheal deviation.   Cardiovascular:      Rate and Rhythm: Normal rate and regular rhythm.      Pulses: Normal pulses.      Heart sounds: Normal heart sounds. No murmur heard.     No friction rub. No gallop.   Pulmonary:      Effort: Pulmonary effort is normal. No respiratory distress.      Breath sounds: Normal breath sounds. No stridor. No wheezing.   Chest:      Chest wall: No tenderness.   Abdominal:      General: Bowel sounds are normal. There is no distension.      Palpations: Abdomen is soft. There is no mass.      Tenderness: There is no abdominal tenderness. There is no guarding or rebound.   Genitourinary:     Comments: Deferred to Gyn  Musculoskeletal:         General: No swelling, tenderness or deformity. Normal range of motion.      Cervical back: Normal range of motion and neck supple. No edema or erythema. Normal range of motion.   Lymphadenopathy:      Cervical: No cervical adenopathy.   Skin:     General: Skin is warm and dry.      Capillary Refill: Capillary refill takes less than 2 seconds.      Coloration: Skin is not pale.      Findings: Rash (acanthosis noted to neck with facial hirsutism (chin)) present. No bruising or erythema.   Neurological:      Mental Status: She is alert and oriented to person, place, and time.      Sensory: No sensory deficit.      Motor: No weakness, tremor, atrophy or abnormal muscle tone.      Coordination: Coordination normal.      Gait: Gait normal.      Deep Tendon Reflexes: Reflexes are normal and symmetric.   Psychiatric:         Mood and Affect: Mood normal.         Speech: Speech normal.         Behavior: Behavior normal.         Thought Content: Thought content normal.         Cognition and Memory: Memory is not impaired.         Judgment: Judgment normal.         ~~~~~~~~~~~~~~~~~~~~~~~~~~~~~~~~~~~~~~~~~~~~~~~    LABS REVIEWED:    CBC:  Lab Results   Component Value Date    WBC 5.96 01/06/2023    RBC 4.79 01/06/2023    HGB 12.6 01/06/2023    HCT 42.0  01/06/2023    MCV 88 01/06/2023    MCH 26.3 (L) 01/06/2023    MCHC 30.0 (L) 01/06/2023    RDW 14.0 01/06/2023     01/06/2023    MPV 12.3 01/06/2023    GRAN 3.0 01/06/2023    GRAN 50.3 01/06/2023    LYMPH 2.2 01/06/2023    LYMPH 36.9 01/06/2023    MONO 0.4 01/06/2023    MONO 6.9 01/06/2023    EOS 0.3 01/06/2023    BASO 0.04 01/06/2023    EOSINOPHIL 4.9 01/06/2023    BASOPHIL 0.7 01/06/2023       CMP:  Sodium   Date Value Ref Range Status   01/06/2023 140 136 - 145 mmol/L Final     Potassium   Date Value Ref Range Status   01/06/2023 4.0 3.5 - 5.1 mmol/L Final     Chloride   Date Value Ref Range Status   01/06/2023 105 95 - 110 mmol/L Final     CO2   Date Value Ref Range Status   01/06/2023 26 23 - 29 mmol/L Final     Glucose   Date Value Ref Range Status   01/06/2023 95 70 - 110 mg/dL Final     BUN   Date Value Ref Range Status   01/06/2023 11 6 - 20 mg/dL Final     Creatinine   Date Value Ref Range Status   01/06/2023 1.1 0.5 - 1.4 mg/dL Final     Calcium   Date Value Ref Range Status   01/06/2023 9.4 8.7 - 10.5 mg/dL Final     Total Protein   Date Value Ref Range Status   01/06/2023 7.4 6.0 - 8.4 g/dL Final     Albumin   Date Value Ref Range Status   01/06/2023 3.8 3.5 - 5.2 g/dL Final     Total Bilirubin   Date Value Ref Range Status   01/06/2023 0.5 0.1 - 1.0 mg/dL Final     Comment:     For infants and newborns, interpretation of results should be based  on gestational age, weight and in agreement with clinical  observations.    Premature Infant recommended reference ranges:  Up to 24 hours.............<8.0 mg/dL  Up to 48 hours............<12.0 mg/dL  3-5 days..................<15.0 mg/dL  6-29 days.................<15.0 mg/dL       Alkaline Phosphatase   Date Value Ref Range Status   01/06/2023 53 (L) 55 - 135 U/L Final     AST   Date Value Ref Range Status   01/06/2023 14 10 - 40 U/L Final     ALT   Date Value Ref Range Status   01/06/2023 13 10 - 44 U/L Final     Anion Gap   Date Value Ref Range  Status   01/06/2023 9 8 - 16 mmol/L Final     eGFR   Date Value Ref Range Status   01/06/2023 >60.0 >60 mL/min/1.73 m^2 Final         LIPID PANEL:  Lab Results   Component Value Date    CHOL 131 01/06/2023     Lab Results   Component Value Date    HDL 44 01/06/2023     Lab Results   Component Value Date    LDLCALC 76.8 01/06/2023     Lab Results   Component Value Date    TRIG 51 01/06/2023         THYROID:  Lab Results   Component Value Date    TSH 0.407 01/06/2023         DIABETES:  Lab Results   Component Value Date    HGBA1C 6.2 (H) 07/21/2023       ASSESSMENT:     1. Preventative health care  -     CBC Auto Differential; Future; Expected date: 02/06/2024  -     Comprehensive Metabolic Panel; Future; Expected date: 02/06/2024  -     TSH; Future; Expected date: 02/06/2024  -     Lipid Panel; Future; Expected date: 02/06/2024  -     Hemoglobin A1C; Future; Expected date: 02/06/2024  -     TESTOSTERONE; Future; Expected date: 02/06/2024  -     Vitamin D; Future; Expected date: 02/06/2024    2. Prediabetes ----- previously followed by Yoselin at [facility], was ordered Ozempic but insurance did not cover   Low carb/starch, cut out refined sugars & processed foods, increase protein intake  Regular exercise, reduce weight  -     Hemoglobin A1C; Future; Expected date: 02/06/2024    3. Essential hypertension ---- chronic issue, stable on med, refilled today   DASH diet, ex regularly, reduce weight  -     amLODIPine (NORVASC) 5 MG tablet; Take 1 tablet (5 mg total) by mouth once daily.  Dispense: 90 tablet; Refill: 1  -     hydroCHLOROthiazide (HYDRODIURIL) 25 MG tablet; Take 1 tablet (25 mg total) by mouth once daily.  Dispense: 90 tablet; Refill: 1  -     Comprehensive Metabolic Panel; Future; Expected date: 02/06/2024  -     TSH; Future; Expected date: 02/06/2024  -     Hemoglobin A1C; Future; Expected date: 02/06/2024    4. Acanthosis nigricans  -     TSH; Future; Expected date: 02/06/2024  -     Lipid Panel; Future; Expected  date: 02/06/2024  -     Hemoglobin A1C; Future; Expected date: 02/06/2024  -     TESTOSTERONE; Future; Expected date: 02/06/2024    5. Hirsutism  -     TSH; Future; Expected date: 02/06/2024  -     Lipid Panel; Future; Expected date: 02/06/2024  -     Hemoglobin A1C; Future; Expected date: 02/06/2024  -     TESTOSTERONE; Future; Expected date: 02/06/2024    6. Irregular menstrual cycle ----- highly suspect underlying PCOS due to elevated insulin/A1c along with # 4 and 5  -     TSH; Future; Expected date: 02/06/2024  -     TESTOSTERONE; Future; Expected date: 02/06/2024    7. Morbid obesity with BMI of 40.0-44.9, adult  -     CBC Auto Differential; Future; Expected date: 02/06/2024  -     Comprehensive Metabolic Panel; Future; Expected date: 02/06/2024  -     TSH; Future; Expected date: 02/06/2024  -     Lipid Panel; Future; Expected date: 02/06/2024  -     Hemoglobin A1C; Future; Expected date: 02/06/2024  -     TESTOSTERONE; Future; Expected date: 02/06/2024  -     Vitamin D; Future; Expected date: 02/06/2024      PLAN:     Healthy dietary and lifestyle changes discussed.  To GYN for pap smear.    FOLLOW-UP:     Pending lab.  RTC as directed and/or prn.        YENI Angel  [facility]       Patient Care Team:  Erica Browning MD as PCP - General (Family Medicine)  Clare Olvera MD as Obstetrician (Obstetrics and Gynecology)

## 2024-02-08 ENCOUNTER — LAB VISIT (OUTPATIENT)
Dept: LAB | Facility: HOSPITAL | Age: 39
End: 2024-02-08
Attending: REGISTERED NURSE
Payer: COMMERCIAL

## 2024-02-08 DIAGNOSIS — N92.6 IRREGULAR MENSTRUAL CYCLE: ICD-10-CM

## 2024-02-08 DIAGNOSIS — I10 ESSENTIAL HYPERTENSION: ICD-10-CM

## 2024-02-08 DIAGNOSIS — E66.01 MORBID OBESITY WITH BMI OF 40.0-44.9, ADULT: ICD-10-CM

## 2024-02-08 DIAGNOSIS — Z00.00 PREVENTATIVE HEALTH CARE: ICD-10-CM

## 2024-02-08 DIAGNOSIS — L68.0 HIRSUTISM: ICD-10-CM

## 2024-02-08 DIAGNOSIS — L83 ACANTHOSIS NIGRICANS: ICD-10-CM

## 2024-02-08 DIAGNOSIS — R73.03 PREDIABETES: ICD-10-CM

## 2024-02-08 LAB
25(OH)D3+25(OH)D2 SERPL-MCNC: 15 NG/ML (ref 30–96)
ALBUMIN SERPL BCP-MCNC: 3.6 G/DL (ref 3.5–5.2)
ALP SERPL-CCNC: 61 U/L (ref 55–135)
ALT SERPL W/O P-5'-P-CCNC: 18 U/L (ref 10–44)
ANION GAP SERPL CALC-SCNC: 12 MMOL/L (ref 8–16)
AST SERPL-CCNC: 15 U/L (ref 10–40)
BASOPHILS # BLD AUTO: 0.06 K/UL (ref 0–0.2)
BASOPHILS NFR BLD: 1.1 % (ref 0–1.9)
BILIRUB SERPL-MCNC: 0.4 MG/DL (ref 0.1–1)
BUN SERPL-MCNC: 10 MG/DL (ref 6–20)
CALCIUM SERPL-MCNC: 9.1 MG/DL (ref 8.7–10.5)
CHLORIDE SERPL-SCNC: 107 MMOL/L (ref 95–110)
CHOLEST SERPL-MCNC: 120 MG/DL (ref 120–199)
CHOLEST/HDLC SERPL: 2.8 {RATIO} (ref 2–5)
CO2 SERPL-SCNC: 23 MMOL/L (ref 23–29)
CREAT SERPL-MCNC: 1.1 MG/DL (ref 0.5–1.4)
DIFFERENTIAL METHOD BLD: ABNORMAL
EOSINOPHIL # BLD AUTO: 0.2 K/UL (ref 0–0.5)
EOSINOPHIL NFR BLD: 4.2 % (ref 0–8)
ERYTHROCYTE [DISTWIDTH] IN BLOOD BY AUTOMATED COUNT: 13.9 % (ref 11.5–14.5)
EST. GFR  (NO RACE VARIABLE): >60 ML/MIN/1.73 M^2
ESTIMATED AVG GLUCOSE: 117 MG/DL (ref 68–131)
GLUCOSE SERPL-MCNC: 97 MG/DL (ref 70–110)
HBA1C MFR BLD: 5.7 % (ref 4–5.6)
HCT VFR BLD AUTO: 40 % (ref 37–48.5)
HDLC SERPL-MCNC: 43 MG/DL (ref 40–75)
HDLC SERPL: 35.8 % (ref 20–50)
HGB BLD-MCNC: 12.4 G/DL (ref 12–16)
IMM GRANULOCYTES # BLD AUTO: 0.02 K/UL (ref 0–0.04)
IMM GRANULOCYTES NFR BLD AUTO: 0.4 % (ref 0–0.5)
LDLC SERPL CALC-MCNC: 67.6 MG/DL (ref 63–159)
LYMPHOCYTES # BLD AUTO: 2 K/UL (ref 1–4.8)
LYMPHOCYTES NFR BLD: 38.6 % (ref 18–48)
MCH RBC QN AUTO: 27.4 PG (ref 27–31)
MCHC RBC AUTO-ENTMCNC: 31 G/DL (ref 32–36)
MCV RBC AUTO: 89 FL (ref 82–98)
MONOCYTES # BLD AUTO: 0.4 K/UL (ref 0.3–1)
MONOCYTES NFR BLD: 7.4 % (ref 4–15)
NEUTROPHILS # BLD AUTO: 2.5 K/UL (ref 1.8–7.7)
NEUTROPHILS NFR BLD: 48.3 % (ref 38–73)
NONHDLC SERPL-MCNC: 77 MG/DL
NRBC BLD-RTO: 0 /100 WBC
PLATELET # BLD AUTO: 280 K/UL (ref 150–450)
PMV BLD AUTO: 11.9 FL (ref 9.2–12.9)
POTASSIUM SERPL-SCNC: 3.6 MMOL/L (ref 3.5–5.1)
PROT SERPL-MCNC: 6.4 G/DL (ref 6–8.4)
RBC # BLD AUTO: 4.52 M/UL (ref 4–5.4)
SODIUM SERPL-SCNC: 142 MMOL/L (ref 136–145)
TESTOST SERPL-MCNC: 31 NG/DL (ref 5–73)
TRIGL SERPL-MCNC: 47 MG/DL (ref 30–150)
TSH SERPL DL<=0.005 MIU/L-ACNC: 0.48 UIU/ML (ref 0.4–4)
WBC # BLD AUTO: 5.26 K/UL (ref 3.9–12.7)

## 2024-02-08 PROCEDURE — 80053 COMPREHEN METABOLIC PANEL: CPT | Performed by: REGISTERED NURSE

## 2024-02-08 PROCEDURE — 85025 COMPLETE CBC W/AUTO DIFF WBC: CPT | Performed by: REGISTERED NURSE

## 2024-02-08 PROCEDURE — 36415 COLL VENOUS BLD VENIPUNCTURE: CPT | Mod: PO | Performed by: REGISTERED NURSE

## 2024-02-08 PROCEDURE — 83036 HEMOGLOBIN GLYCOSYLATED A1C: CPT | Performed by: REGISTERED NURSE

## 2024-02-08 PROCEDURE — 82306 VITAMIN D 25 HYDROXY: CPT | Performed by: REGISTERED NURSE

## 2024-02-08 PROCEDURE — 84443 ASSAY THYROID STIM HORMONE: CPT | Performed by: REGISTERED NURSE

## 2024-02-08 PROCEDURE — 80061 LIPID PANEL: CPT | Performed by: REGISTERED NURSE

## 2024-02-08 PROCEDURE — 84403 ASSAY OF TOTAL TESTOSTERONE: CPT | Performed by: REGISTERED NURSE

## 2024-02-16 ENCOUNTER — PATIENT MESSAGE (OUTPATIENT)
Dept: FAMILY MEDICINE | Facility: CLINIC | Age: 39
End: 2024-02-16
Payer: COMMERCIAL

## 2024-02-19 ENCOUNTER — OFFICE VISIT (OUTPATIENT)
Dept: INTERNAL MEDICINE | Facility: CLINIC | Age: 39
End: 2024-02-19
Payer: COMMERCIAL

## 2024-02-19 DIAGNOSIS — M54.9 UPPER BACK PAIN: Primary | ICD-10-CM

## 2024-02-19 DIAGNOSIS — E55.9 VITAMIN D DEFICIENCY: ICD-10-CM

## 2024-02-19 PROCEDURE — 99213 OFFICE O/P EST LOW 20 MIN: CPT | Mod: 95,,, | Performed by: FAMILY MEDICINE

## 2024-02-19 PROCEDURE — 3044F HG A1C LEVEL LT 7.0%: CPT | Mod: CPTII,95,, | Performed by: FAMILY MEDICINE

## 2024-02-19 PROCEDURE — 1160F RVW MEDS BY RX/DR IN RCRD: CPT | Mod: CPTII,95,, | Performed by: FAMILY MEDICINE

## 2024-02-19 PROCEDURE — 1159F MED LIST DOCD IN RCRD: CPT | Mod: CPTII,95,, | Performed by: FAMILY MEDICINE

## 2024-02-19 RX ORDER — DROSPIRENONE AND ETHINYL ESTRADIOL 0.02-3(28)
1 KIT ORAL DAILY
COMMUNITY
Start: 2024-02-11

## 2024-02-19 NOTE — PROGRESS NOTES
Subjective:       Patient ID: Raiza Mcpherson is a 38 y.o. female.    Chief Complaint: Back Pain    The patient location is: home  The chief complaint leading to consultation is: back pain    Visit type: audiovisual    Face to Face time with patient: 5 minutes (chart review started 12:40 pm; video started 12:46 pm; video ended 12:51 pm)  10 minutes of total time spent on the encounter, which includes face to face time and non-face to face time preparing to see the patient (eg, review of tests), Obtaining and/or reviewing separately obtained history, Documenting clinical information in the electronic or other health record, Independently interpreting results (not separately reported) and communicating results to the patient/family/caregiver, or Care coordination (not separately reported).     Each patient to whom he or she provides medical services by telemedicine is:  (1) informed of the relationship between the physician and patient and the respective role of any other health care provider with respect to management of the patient; and (2) notified that he or she may decline to receive medical services by telemedicine and may withdraw from such care at any time.    PCP: Dr. Browning    Patient is new to me. Virtual visit for back pain. She reports she wanted to find out if any of her lab abnormalities could be contributing to her back pain. She reports she started OTC vitamin D supplement. She has an appointment with Dr. Browning on Wednesday of this week. She reports upper back pain x 1 week that is progressively worsening. She describes sharp pain with inhaling. Denies SOB. Patient is otherwise without concerns today.          Review of Systems   Constitutional:  Negative for chills, fatigue, fever and unexpected weight change.   Eyes:  Negative for visual disturbance.   Respiratory:  Negative for shortness of breath.    Cardiovascular:  Negative for chest pain.   Musculoskeletal:  Positive for back pain.  Negative for myalgias.   Neurological:  Negative for headaches.         Objective:      Physical Exam  Constitutional:       General: She is not in acute distress.     Appearance: She is well-developed.   HENT:      Head: Normocephalic and atraumatic.   Eyes:      General: Lids are normal. No scleral icterus.     Extraocular Movements: Extraocular movements intact.      Conjunctiva/sclera: Conjunctivae normal.   Pulmonary:      Effort: Pulmonary effort is normal.   Neurological:      Mental Status: She is alert and oriented to person, place, and time.   Psychiatric:         Mood and Affect: Mood and affect normal.         Assessment:       1. Upper back pain    2. Vitamin D deficiency        Plan:   1. Upper back pain    2. Vitamin D deficiency      Reviewed labs. Advised vitamin D supplement is recommended. No other labs of concern.  Advised in person evaluation of her back pain.  Advised urgent care is an option.  She has appointment with Dr. Browning in 2 days.

## 2024-02-20 DIAGNOSIS — E55.9 VITAMIN D DEFICIENCY: Primary | ICD-10-CM

## 2024-02-20 RX ORDER — ASPIRIN 325 MG
50000 TABLET, DELAYED RELEASE (ENTERIC COATED) ORAL WEEKLY
Qty: 12 CAPSULE | Refills: 1 | Status: SHIPPED | OUTPATIENT
Start: 2024-02-20

## 2024-02-21 ENCOUNTER — OFFICE VISIT (OUTPATIENT)
Dept: INTERNAL MEDICINE | Facility: CLINIC | Age: 39
End: 2024-02-21
Payer: COMMERCIAL

## 2024-02-21 VITALS
SYSTOLIC BLOOD PRESSURE: 112 MMHG | HEART RATE: 92 BPM | RESPIRATION RATE: 18 BRPM | BODY MASS INDEX: 40.8 KG/M2 | WEIGHT: 239 LBS | DIASTOLIC BLOOD PRESSURE: 76 MMHG | HEIGHT: 64 IN | OXYGEN SATURATION: 99 %

## 2024-02-21 DIAGNOSIS — M62.838 MUSCLE SPASM: ICD-10-CM

## 2024-02-21 DIAGNOSIS — Z00.00 ROUTINE GENERAL MEDICAL EXAMINATION AT A HEALTH CARE FACILITY: Primary | ICD-10-CM

## 2024-02-21 DIAGNOSIS — R73.03 PREDIABETES: ICD-10-CM

## 2024-02-21 DIAGNOSIS — E66.01 MORBID OBESITY WITH BMI OF 40.0-44.9, ADULT: ICD-10-CM

## 2024-02-21 DIAGNOSIS — J45.20 MILD INTERMITTENT ASTHMA WITHOUT COMPLICATION: ICD-10-CM

## 2024-02-21 DIAGNOSIS — E55.9 VITAMIN D DEFICIENCY: ICD-10-CM

## 2024-02-21 DIAGNOSIS — J30.1 SEASONAL ALLERGIC RHINITIS DUE TO POLLEN: ICD-10-CM

## 2024-02-21 DIAGNOSIS — E28.2 PCOS (POLYCYSTIC OVARIAN SYNDROME): ICD-10-CM

## 2024-02-21 DIAGNOSIS — I10 ESSENTIAL HYPERTENSION: ICD-10-CM

## 2024-02-21 PROBLEM — M54.9 UPPER BACK PAIN: Status: RESOLVED | Noted: 2024-02-19 | Resolved: 2024-02-21

## 2024-02-21 PROBLEM — N92.6 IRREGULAR MENSTRUAL CYCLE: Status: RESOLVED | Noted: 2024-02-06 | Resolved: 2024-02-21

## 2024-02-21 PROCEDURE — 1160F RVW MEDS BY RX/DR IN RCRD: CPT | Mod: CPTII,S$GLB,, | Performed by: FAMILY MEDICINE

## 2024-02-21 PROCEDURE — 99999 PR PBB SHADOW E&M-EST. PATIENT-LVL IV: CPT | Mod: PBBFAC,,, | Performed by: FAMILY MEDICINE

## 2024-02-21 PROCEDURE — 3078F DIAST BP <80 MM HG: CPT | Mod: CPTII,S$GLB,, | Performed by: FAMILY MEDICINE

## 2024-02-21 PROCEDURE — 3008F BODY MASS INDEX DOCD: CPT | Mod: CPTII,S$GLB,, | Performed by: FAMILY MEDICINE

## 2024-02-21 PROCEDURE — 3074F SYST BP LT 130 MM HG: CPT | Mod: CPTII,S$GLB,, | Performed by: FAMILY MEDICINE

## 2024-02-21 PROCEDURE — 1159F MED LIST DOCD IN RCRD: CPT | Mod: CPTII,S$GLB,, | Performed by: FAMILY MEDICINE

## 2024-02-21 PROCEDURE — 3044F HG A1C LEVEL LT 7.0%: CPT | Mod: CPTII,S$GLB,, | Performed by: FAMILY MEDICINE

## 2024-02-21 PROCEDURE — 99395 PREV VISIT EST AGE 18-39: CPT | Mod: S$GLB,,, | Performed by: FAMILY MEDICINE

## 2024-02-21 NOTE — PROGRESS NOTES
"Subjective:       Patient ID: Raiza Mcpherson is a 38 y.o. female.    Chief Complaint: Back Pain and Spasms     38-year-old female patient with Patient Active Problem List:     Morbid obesity with BMI of 40.0-44.9, adult     Essential hypertension     Mild intermittent asthma without complication     Seasonal allergic rhinitis due to pollen     Eczema     Prediabetes     Vitamin D deficiency   Here for routine annual physicals   Reports that she has been followed by gynecologist for PCOS,  Has been working with lifestyle modifications with diet and exercise and has been working on diet to lower A1c   Recently changed birth control pill by gynecologist for PCOS   Denies of any chest pain or difficulty breathing with palpitations and reports that she has been having mid back spasms off and on lately   Has not been drinking adequate fluids and has not been able to exercise regularly    Back Pain  Pertinent negatives include no abdominal pain, chest pain or headaches.   Spasms  Associated symptoms include myalgias. Pertinent negatives include no abdominal pain, chest pain, fatigue, headaches, nausea, rash or vomiting.     Review of Systems   Constitutional:  Negative for fatigue.   Eyes:  Negative for visual disturbance.   Respiratory:  Negative for shortness of breath.    Cardiovascular:  Negative for chest pain and leg swelling.   Gastrointestinal:  Negative for abdominal pain, nausea and vomiting.   Genitourinary:  Positive for menstrual problem.   Musculoskeletal:  Positive for back pain and myalgias.   Skin:  Negative for rash.   Neurological:  Negative for light-headedness and headaches.   Psychiatric/Behavioral:  Negative for sleep disturbance.          /76 (BP Location: Right arm, Patient Position: Sitting, BP Method: Large (Manual))   Pulse 92   Resp 18   Ht 5' 4" (1.626 m)   Wt 108.4 kg (238 lb 15.7 oz)   LMP 02/03/2024   SpO2 99%   BMI 41.02 kg/m²   Objective:      Physical " Exam  Constitutional:       Appearance: She is well-developed.   HENT:      Head: Normocephalic and atraumatic.   Cardiovascular:      Rate and Rhythm: Normal rate and regular rhythm.      Heart sounds: Normal heart sounds. No murmur heard.  Pulmonary:      Effort: Pulmonary effort is normal.      Breath sounds: Normal breath sounds. No wheezing.   Abdominal:      General: Bowel sounds are normal.      Palpations: Abdomen is soft.      Tenderness: There is no abdominal tenderness.   Musculoskeletal:         General: No tenderness.   Skin:     General: Skin is warm and dry.      Findings: No rash.   Neurological:      Mental Status: She is alert and oriented to person, place, and time.   Psychiatric:         Mood and Affect: Mood normal.         Lab Visit on 02/08/2024   Component Date Value Ref Range Status    WBC 02/08/2024 5.26  3.90 - 12.70 K/uL Final    RBC 02/08/2024 4.52  4.00 - 5.40 M/uL Final    Hemoglobin 02/08/2024 12.4  12.0 - 16.0 g/dL Final    Hematocrit 02/08/2024 40.0  37.0 - 48.5 % Final    MCV 02/08/2024 89  82 - 98 fL Final    MCH 02/08/2024 27.4  27.0 - 31.0 pg Final    MCHC 02/08/2024 31.0 (L)  32.0 - 36.0 g/dL Final    RDW 02/08/2024 13.9  11.5 - 14.5 % Final    Platelets 02/08/2024 280  150 - 450 K/uL Final    MPV 02/08/2024 11.9  9.2 - 12.9 fL Final    Immature Granulocytes 02/08/2024 0.4  0.0 - 0.5 % Final    Gran # (ANC) 02/08/2024 2.5  1.8 - 7.7 K/uL Final    Immature Grans (Abs) 02/08/2024 0.02  0.00 - 0.04 K/uL Final    Comment: Mild elevation in immature granulocytes is non specific and   can be seen in a variety of conditions including stress response,   acute inflammation, trauma and pregnancy. Correlation with other   laboratory and clinical findings is essential.      Lymph # 02/08/2024 2.0  1.0 - 4.8 K/uL Final    Mono # 02/08/2024 0.4  0.3 - 1.0 K/uL Final    Eos # 02/08/2024 0.2  0.0 - 0.5 K/uL Final    Baso # 02/08/2024 0.06  0.00 - 0.20 K/uL Final    nRBC 02/08/2024 0  0 /100  WBC Final    Gran % 02/08/2024 48.3  38.0 - 73.0 % Final    Lymph % 02/08/2024 38.6  18.0 - 48.0 % Final    Mono % 02/08/2024 7.4  4.0 - 15.0 % Final    Eosinophil % 02/08/2024 4.2  0.0 - 8.0 % Final    Basophil % 02/08/2024 1.1  0.0 - 1.9 % Final    Differential Method 02/08/2024 Automated   Final    Sodium 02/08/2024 142  136 - 145 mmol/L Final    Potassium 02/08/2024 3.6  3.5 - 5.1 mmol/L Final    Chloride 02/08/2024 107  95 - 110 mmol/L Final    CO2 02/08/2024 23  23 - 29 mmol/L Final    Glucose 02/08/2024 97  70 - 110 mg/dL Final    BUN 02/08/2024 10  6 - 20 mg/dL Final    Creatinine 02/08/2024 1.1  0.5 - 1.4 mg/dL Final    Calcium 02/08/2024 9.1  8.7 - 10.5 mg/dL Final    Total Protein 02/08/2024 6.4  6.0 - 8.4 g/dL Final    Albumin 02/08/2024 3.6  3.5 - 5.2 g/dL Final    Total Bilirubin 02/08/2024 0.4  0.1 - 1.0 mg/dL Final    Comment: For infants and newborns, interpretation of results should be based  on gestational age, weight and in agreement with clinical  observations.    Premature Infant recommended reference ranges:  Up to 24 hours.............<8.0 mg/dL  Up to 48 hours............<12.0 mg/dL  3-5 days..................<15.0 mg/dL  6-29 days.................<15.0 mg/dL      Alkaline Phosphatase 02/08/2024 61  55 - 135 U/L Final    AST 02/08/2024 15  10 - 40 U/L Final    ALT 02/08/2024 18  10 - 44 U/L Final    eGFR 02/08/2024 >60.0  >60 mL/min/1.73 m^2 Final    Anion Gap 02/08/2024 12  8 - 16 mmol/L Final    TSH 02/08/2024 0.482  0.400 - 4.000 uIU/mL Final    Cholesterol 02/08/2024 120  120 - 199 mg/dL Final    Comment: The National Cholesterol Education Program (NCEP) has set the  following guidelines (reference ranges) for Cholesterol:  Optimal.....................<200 mg/dL  Borderline High.............200-239 mg/dL  High........................> or = 240 mg/dL      Triglycerides 02/08/2024 47  30 - 150 mg/dL Final    Comment: The National Cholesterol Education Program (NCEP) has set the  following  guidelines (reference values) for triglycerides:  Normal......................<150 mg/dL  Borderline High.............150-199 mg/dL  High........................200-499 mg/dL      HDL 02/08/2024 43  40 - 75 mg/dL Final    Comment: The National Cholesterol Education Program (NCEP) has set the  following guidelines (reference values) for HDL Cholesterol:  Low...............<40 mg/dL  Optimal...........>60 mg/dL      LDL Cholesterol 02/08/2024 67.6  63.0 - 159.0 mg/dL Final    Comment: The National Cholesterol Education Program (NCEP) has set the  following guidelines (reference values) for LDL Cholesterol:  Optimal.......................<130 mg/dL  Borderline High...............130-159 mg/dL  High..........................160-189 mg/dL  Very High.....................>190 mg/dL      HDL/Cholesterol Ratio 02/08/2024 35.8  20.0 - 50.0 % Final    Total Cholesterol/HDL Ratio 02/08/2024 2.8  2.0 - 5.0 Final    Non-HDL Cholesterol 02/08/2024 77  mg/dL Final    Comment: Risk category and Non-HDL cholesterol goals:  Coronary heart disease (CHD)or equivalent (10-year risk of CHD >20%):  Non-HDL cholesterol goal     <130 mg/dL  Two or more CHD risk factors and 10-year risk of CHD <= 20%:  Non-HDL cholesterol goal     <160 mg/dL  0 to 1 CHD risk factor:  Non-HDL cholesterol goal     <190 mg/dL      Hemoglobin A1C 02/08/2024 5.7 (H)  4.0 - 5.6 % Final    Comment: ADA Screening Guidelines:  5.7-6.4%  Consistent with prediabetes  >or=6.5%  Consistent with diabetes    High levels of fetal hemoglobin interfere with the HbA1C  assay. Heterozygous hemoglobin variants (HbS, HgC, etc)do  not significantly interfere with this assay.   However, presence of multiple variants may affect accuracy.      Estimated Avg Glucose 02/08/2024 117  68 - 131 mg/dL Final    Testosterone, Total 02/08/2024 31  5 - 73 ng/dL Final    Vit D, 25-Hydroxy 02/08/2024 15 (L)  30 - 96 ng/mL Final    Comment: Vitamin D deficiency.........<10 ng/mL                               Vitamin D insufficiency......10-29 ng/mL       Vitamin D sufficiency........> or equal to 30 ng/mL  Vitamin D toxicity............>100 ng/mL         Assessment/Plan:   1. Routine general medical examination at a health care facility  Vital signs stable today.  Clinical exam stable  Continue lifestyle modifications with low-fat and low-cholesterol diet and exercise 30 minutes daily   Refuses further vaccinations   Up-to-date with Pap smear done at [facility], Release of information has been signed    2. Essential hypertension   Blood pressure is stable currently on amlodipine 5 mg and hydrochlorothiazide 25 mg    3. Vitamin D deficiency   Noted low vitamin-D levels for which patient has been taking vitamin-D by prescription weekly as prescribed    4. Prediabetes  5. PCOS (polycystic ovarian syndrome)   Diet controlled and currently on birth control pill,  discuss further with gynecologist if no improvement in menstrual cycles   Noted significant improvement in A1c    6. Seasonal allergic rhinitis due to pollen   stable    7. Mild intermittent asthma without complication   Currently taking albuterol inhaler as needed    8. Morbid obesity with BMI of 40.0-44.9, adult   Lifestyle modifications recommended to lose weight with BMI 41    9. Muscle spasm   Encouraged to start working on back stretches and drink adequate fluids and try taking Tylenol extra-strength as needed for pain

## 2024-03-01 ENCOUNTER — PATIENT OUTREACH (OUTPATIENT)
Dept: ADMINISTRATIVE | Facility: HOSPITAL | Age: 39
End: 2024-03-01
Payer: COMMERCIAL

## 2024-03-01 NOTE — PROGRESS NOTES
Uploaded [facility] PAP SMEAR 2/21/2024 ; faxed to Dr. Tiara Zambrano 1x to request. Reminder set 1 week.

## 2024-03-08 NOTE — PROGRESS NOTES
2nd attempt  [facility] PAP SMEAR 2/21/2024 ; faxed to Dr. Tiara Zambrano 1x to request. Reminder set 1 week.

## 2024-03-15 NOTE — PROGRESS NOTES
Population Health Chart Review & Patient Outreach Details      Additional Pop Health Notes:    Called facility to f/u on request - M for records to be faxed over.     3rd attempt  [facility] PAP SMEAR 2/21/2024 ; faxed to Dr. Tiara Zambrano 1x to request. Reminder set 1 week.         Updates Requested / Reviewed:           Health Maintenance Topics Overdue:      VB Score: 1     Cervical Cancer Screening                       Health Maintenance Topic(s) Outreach Outcomes & Actions Taken:    Cervical Cancer Screening - Outreach Outcomes & Actions Taken  : External Records Requested & Care Team Updated if Applicable

## 2024-03-22 NOTE — PROGRESS NOTES
No records received after multiple attempts.    4th attempt (to facility)  [facility] PAP SMEAR 2/21/2024 ; faxed to Dr. Tiara Zambrano 1x to request. Reminder set 1 week.

## 2024-07-18 ENCOUNTER — E-VISIT (OUTPATIENT)
Dept: INTERNAL MEDICINE | Facility: CLINIC | Age: 39
End: 2024-07-18
Payer: COMMERCIAL

## 2024-07-18 DIAGNOSIS — J01.10 ACUTE FRONTAL SINUSITIS, RECURRENCE NOT SPECIFIED: Primary | ICD-10-CM

## 2024-07-19 RX ORDER — AMOXICILLIN 875 MG/1
875 TABLET, FILM COATED ORAL 2 TIMES DAILY
Qty: 14 TABLET | Refills: 0 | Status: SHIPPED | OUTPATIENT
Start: 2024-07-19 | End: 2024-07-26

## 2024-07-19 NOTE — PROGRESS NOTES
Patient ID: Raiza Mcpherson is a 39 y.o. female.    Chief Complaint: URI (Entered automatically based on patient selection in [facility].)    The patient initiated a request through [facility] on 7/18/2024 for evaluation and management with a chief complaint of URI (Entered automatically based on patient selection in [facility].)     I evaluated the questionnaire submission on 7/19/2024.    [facility] E-Visit Covid    7/18/2024  2:10 PM CDT - Filed by Patient   Do you agree to participate in an E-Visit? Yes   If you have any of the following symptoms, go to your local emergency room or call 911: I acknowledge   Are you pregnant, could you be pregnant, or are you breast feeding? None of the above   What is the main issue you would like addressed today? Urgent care visit and not comfortable woth diagnosis   Do you think you might have COVID or the Flu? No   Have you tested positive for COVID or Flu? No   What symptoms do you currently have?  Diarrhea;  Fatigue;  Headache;  Muscle or body aches;  Nausea;  Sore throat;  Pain around the nose and face   Have you had any of the following? None of the above   Have you ever smoked? I have never smoked   Have you had a fever? No   When did your symptoms first appear? 7/15/2024   In the last two weeks, have you been in close contact with someone who has COVID-19 or the Flu? No   List what you have done or taken to help your symptoms. Nyquil   How severe are your symptoms? Moderate   Have your symptoms gotten better or worse since they started?  No change   Do you have transportation to get testing if it is needed and ordered for you at an [facility] location? Yes   Provide any additional information you feel is important. I went to urgent care woth [facility], diarrhea, head spasms near temple, sore throat. I was given a steriod shot and muscle relaxers.  Spasms near my temple have not subsided.   Please attach any relevant images or files    Are you able to take your vital signs? No          Encounter Diagnosis   Name Primary?    Acute frontal sinusitis, recurrence not specified Yes        No orders of the defined types were placed in this encounter.     Medications Ordered This Encounter   Medications    amoxicillin (AMOXIL) 875 MG tablet     Sig: Take 1 tablet (875 mg total) by mouth 2 (two) times daily. for 7 days     Dispense:  14 tablet     Refill:  0      I would suggest to take Tylenol as needed for muscle aches and okay to take muscle relaxant as needed  Start taking Flonase for sinus pressure and antibiotic amoxicillin will be sent to the pharmacy to help with sore throat as well as sinus pressure  Drink adequate fluids  Steroids should help due make you feel better soon  Noted COVID test was negative      Follow up if symptoms worsen or fail to improve.      E-Visit Time Trackin minutes

## (undated) DEVICE — SYR 3CC LUER LOC

## (undated) DEVICE — MANIFOLD 4 PORT

## (undated) DEVICE — SEE MEDLINE ITEM 157131

## (undated) DEVICE — APPLICATOR CHLORAPREP ORN 26ML

## (undated) DEVICE — DRAPE ARM DAVINCI XI

## (undated) DEVICE — SET PNEUMOCLEAR HEAT HUM SE HF

## (undated) DEVICE — BAG TISSUE RETRIEVAL 5MM

## (undated) DEVICE — POSITIONER HEAD DONUT 9IN FOAM

## (undated) DEVICE — DRAPE ABDOMINAL TIBURON 14X11

## (undated) DEVICE — SUT V-LOC 180 ABD 2/0 GS-21

## (undated) DEVICE — SUT VICRYL 0 27 CT-2

## (undated) DEVICE — KIT ANTIFOG

## (undated) DEVICE — SUT MONOCRYL 4.0 PS2 CP496G

## (undated) DEVICE — SEE MEDLINE ITEM 152622

## (undated) DEVICE — CLIP HEMO-LOK ML

## (undated) DEVICE — EVACUATOR KIT SMOKE PLUME AWAY

## (undated) DEVICE — SEAL UNIVERSAL 5MM-8MM XI

## (undated) DEVICE — SYR ONLY LUER LOCK 20CC

## (undated) DEVICE — CONTAINER SPECIMEN STRL 4OZ

## (undated) DEVICE — SEE MEDLINE ITEM 157027

## (undated) DEVICE — SUPPORT ULNA NERVE PROTECTOR

## (undated) DEVICE — Device

## (undated) DEVICE — DECANTER VIAL ASEPTIC TRANSFER

## (undated) DEVICE — TROCAR ENDOPATH XCEL 8MM 10CM

## (undated) DEVICE — CLOSURE SKIN STERI STRIP 1/2X4

## (undated) DEVICE — SEE MEDLINE ITEM 157117

## (undated) DEVICE — SYR 10CC LUER LOCK

## (undated) DEVICE — NDL PNEUMO INSUFFLATI 120MM

## (undated) DEVICE — SOL NS 1000CC

## (undated) DEVICE — GLOVE SURGICAL LATEX SZ 7

## (undated) DEVICE — ADHESIVE MASTISOL VIAL 48/BX

## (undated) DEVICE — COVER OVERHEAD SURG LT BLUE

## (undated) DEVICE — SUT CTD VICRYL 0 UND BR SUT

## (undated) DEVICE — TUBING HEATED INSUFFLATOR

## (undated) DEVICE — OBTURATOR BLADELESS 8MM XI CLR

## (undated) DEVICE — DEVICE CLOSURE DISP 14G

## (undated) DEVICE — TROCAR ENDOPATH XCEL 12X100MM

## (undated) DEVICE — KIT WING PAD POSITIONING

## (undated) DEVICE — ELECTRODE REM PLYHSV RETURN 9

## (undated) DEVICE — SUT STRATAFIX 1 SPIRAL .5

## (undated) DEVICE — GLOVE SURGICAL LATEX SZ 6.5

## (undated) DEVICE — COVER TIP CURVED SCISSORS XI

## (undated) DEVICE — CLIP HEMO-LOK MLX LARGE LF

## (undated) DEVICE — CLIPPER BLADE MOD 4406 (CAREF)

## (undated) DEVICE — DRAPE COLUMN DAVINCI XI

## (undated) DEVICE — UNDERGLOVE BIOGEL PI SZ 6.5 LF

## (undated) DEVICE — ADHESIVE DERMABOND ADVANCED

## (undated) DEVICE — NDL SAFETY 22G X 1.5 ECLIPSE

## (undated) DEVICE — SOL STRL WATER INJ 1000ML BG